# Patient Record
Sex: MALE | Race: WHITE | NOT HISPANIC OR LATINO | Employment: OTHER | URBAN - METROPOLITAN AREA
[De-identification: names, ages, dates, MRNs, and addresses within clinical notes are randomized per-mention and may not be internally consistent; named-entity substitution may affect disease eponyms.]

---

## 2019-04-18 ENCOUNTER — APPOINTMENT (OUTPATIENT)
Dept: RADIOLOGY | Facility: MEDICAL CENTER | Age: 84
DRG: 061 | End: 2019-04-18
Attending: EMERGENCY MEDICINE
Payer: MEDICARE

## 2019-04-18 ENCOUNTER — APPOINTMENT (OUTPATIENT)
Dept: RADIOLOGY | Facility: MEDICAL CENTER | Age: 84
DRG: 061 | End: 2019-04-18
Attending: INTERNAL MEDICINE
Payer: MEDICARE

## 2019-04-18 ENCOUNTER — HOSPITAL ENCOUNTER (INPATIENT)
Facility: MEDICAL CENTER | Age: 84
LOS: 3 days | DRG: 061 | End: 2019-04-21
Attending: EMERGENCY MEDICINE | Admitting: INTERNAL MEDICINE
Payer: MEDICARE

## 2019-04-18 DIAGNOSIS — I63.9 ACUTE CVA (CEREBROVASCULAR ACCIDENT) (HCC): ICD-10-CM

## 2019-04-18 LAB
ABO GROUP BLD: NORMAL
ABO GROUP BLD: NORMAL
ALBUMIN SERPL BCP-MCNC: 3.8 G/DL (ref 3.2–4.9)
ALBUMIN/GLOB SERPL: 1.2 G/DL
ALP SERPL-CCNC: 130 U/L (ref 30–99)
ALT SERPL-CCNC: 8 U/L (ref 2–50)
ANION GAP SERPL CALC-SCNC: 10 MMOL/L (ref 0–11.9)
APTT PPP: 35.2 SEC (ref 24.7–36)
AST SERPL-CCNC: 23 U/L (ref 12–45)
BASOPHILS # BLD AUTO: 0.5 % (ref 0–1.8)
BASOPHILS # BLD: 0.04 K/UL (ref 0–0.12)
BILIRUB SERPL-MCNC: 0.6 MG/DL (ref 0.1–1.5)
BLD GP AB SCN SERPL QL: NORMAL
BUN SERPL-MCNC: 9 MG/DL (ref 8–22)
CALCIUM SERPL-MCNC: 8.9 MG/DL (ref 8.5–10.5)
CHLORIDE SERPL-SCNC: 96 MMOL/L (ref 96–112)
CO2 SERPL-SCNC: 20 MMOL/L (ref 20–33)
CREAT SERPL-MCNC: 0.64 MG/DL (ref 0.5–1.4)
EKG IMPRESSION: NORMAL
EOSINOPHIL # BLD AUTO: 0.18 K/UL (ref 0–0.51)
EOSINOPHIL NFR BLD: 2.2 % (ref 0–6.9)
ERYTHROCYTE [DISTWIDTH] IN BLOOD BY AUTOMATED COUNT: 41.4 FL (ref 35.9–50)
EST. AVERAGE GLUCOSE BLD GHB EST-MCNC: 100 MG/DL
GLOBULIN SER CALC-MCNC: 3.3 G/DL (ref 1.9–3.5)
GLUCOSE SERPL-MCNC: 106 MG/DL (ref 65–99)
HBA1C MFR BLD: 5.1 % (ref 0–5.6)
HCT VFR BLD AUTO: 37.2 % (ref 42–52)
HGB BLD-MCNC: 13.5 G/DL (ref 14–18)
IMM GRANULOCYTES # BLD AUTO: 0.14 K/UL (ref 0–0.11)
IMM GRANULOCYTES NFR BLD AUTO: 1.7 % (ref 0–0.9)
INR PPP: 1.05 (ref 0.87–1.13)
LYMPHOCYTES # BLD AUTO: 1.49 K/UL (ref 1–4.8)
LYMPHOCYTES NFR BLD: 17.9 % (ref 22–41)
MCH RBC QN AUTO: 34.7 PG (ref 27–33)
MCHC RBC AUTO-ENTMCNC: 36.3 G/DL (ref 33.7–35.3)
MCV RBC AUTO: 95.6 FL (ref 81.4–97.8)
MONOCYTES # BLD AUTO: 1.36 K/UL (ref 0–0.85)
MONOCYTES NFR BLD AUTO: 16.4 % (ref 0–13.4)
NEUTROPHILS # BLD AUTO: 5.1 K/UL (ref 1.82–7.42)
NEUTROPHILS NFR BLD: 61.3 % (ref 44–72)
NRBC # BLD AUTO: 0 K/UL
NRBC BLD-RTO: 0 /100 WBC
PLATELET # BLD AUTO: 260 K/UL (ref 164–446)
PMV BLD AUTO: 10.2 FL (ref 9–12.9)
POTASSIUM SERPL-SCNC: 3.9 MMOL/L (ref 3.6–5.5)
PROT SERPL-MCNC: 7.1 G/DL (ref 6–8.2)
PROTHROMBIN TIME: 13.8 SEC (ref 12–14.6)
RBC # BLD AUTO: 3.89 M/UL (ref 4.7–6.1)
RH BLD: NORMAL
RH BLD: NORMAL
SODIUM SERPL-SCNC: 126 MMOL/L (ref 135–145)
TROPONIN I SERPL-MCNC: <0.01 NG/ML (ref 0–0.04)
WBC # BLD AUTO: 8.3 K/UL (ref 4.8–10.8)

## 2019-04-18 PROCEDURE — 85730 THROMBOPLASTIN TIME PARTIAL: CPT

## 2019-04-18 PROCEDURE — 36415 COLL VENOUS BLD VENIPUNCTURE: CPT

## 2019-04-18 PROCEDURE — 37212 THROMBOLYTIC VENOUS THERAPY: CPT

## 2019-04-18 PROCEDURE — 770022 HCHG ROOM/CARE - ICU (200)

## 2019-04-18 PROCEDURE — 96375 TX/PRO/DX INJ NEW DRUG ADDON: CPT

## 2019-04-18 PROCEDURE — 700105 HCHG RX REV CODE 258: Performed by: EMERGENCY MEDICINE

## 2019-04-18 PROCEDURE — 700117 HCHG RX CONTRAST REV CODE 255: Performed by: EMERGENCY MEDICINE

## 2019-04-18 PROCEDURE — 86850 RBC ANTIBODY SCREEN: CPT

## 2019-04-18 PROCEDURE — G0509 CRIT CARE TELEHEA CONSULT 50: HCPCS | Mod: G0 | Performed by: PSYCHIATRY & NEUROLOGY

## 2019-04-18 PROCEDURE — 700111 HCHG RX REV CODE 636 W/ 250 OVERRIDE (IP): Performed by: PSYCHIATRY & NEUROLOGY

## 2019-04-18 PROCEDURE — 3E03317 INTRODUCTION OF OTHER THROMBOLYTIC INTO PERIPHERAL VEIN, PERCUTANEOUS APPROACH: ICD-10-PCS | Performed by: PSYCHIATRY & NEUROLOGY

## 2019-04-18 PROCEDURE — 99223 1ST HOSP IP/OBS HIGH 75: CPT | Mod: AI | Performed by: INTERNAL MEDICINE

## 2019-04-18 PROCEDURE — 70450 CT HEAD/BRAIN W/O DYE: CPT

## 2019-04-18 PROCEDURE — 93005 ELECTROCARDIOGRAM TRACING: CPT | Performed by: EMERGENCY MEDICINE

## 2019-04-18 PROCEDURE — 5A1945Z RESPIRATORY VENTILATION, 24-96 CONSECUTIVE HOURS: ICD-10-PCS | Performed by: INTERNAL MEDICINE

## 2019-04-18 PROCEDURE — 304561 HCHG STAT O2

## 2019-04-18 PROCEDURE — 70498 CT ANGIOGRAPHY NECK: CPT

## 2019-04-18 PROCEDURE — 0BH17EZ INSERTION OF ENDOTRACHEAL AIRWAY INTO TRACHEA, VIA NATURAL OR ARTIFICIAL OPENING: ICD-10-PCS | Performed by: EMERGENCY MEDICINE

## 2019-04-18 PROCEDURE — 700111 HCHG RX REV CODE 636 W/ 250 OVERRIDE (IP)

## 2019-04-18 PROCEDURE — 700105 HCHG RX REV CODE 258: Performed by: INTERNAL MEDICINE

## 2019-04-18 PROCEDURE — 85025 COMPLETE CBC W/AUTO DIFF WBC: CPT

## 2019-04-18 PROCEDURE — 31500 INSERT EMERGENCY AIRWAY: CPT

## 2019-04-18 PROCEDURE — 99291 CRITICAL CARE FIRST HOUR: CPT

## 2019-04-18 PROCEDURE — 700105 HCHG RX REV CODE 258: Performed by: PSYCHIATRY & NEUROLOGY

## 2019-04-18 PROCEDURE — 83036 HEMOGLOBIN GLYCOSYLATED A1C: CPT

## 2019-04-18 PROCEDURE — 86901 BLOOD TYPING SEROLOGIC RH(D): CPT

## 2019-04-18 PROCEDURE — 70496 CT ANGIOGRAPHY HEAD: CPT

## 2019-04-18 PROCEDURE — 71045 X-RAY EXAM CHEST 1 VIEW: CPT

## 2019-04-18 PROCEDURE — 84484 ASSAY OF TROPONIN QUANT: CPT

## 2019-04-18 PROCEDURE — 85610 PROTHROMBIN TIME: CPT

## 2019-04-18 PROCEDURE — 80053 COMPREHEN METABOLIC PANEL: CPT

## 2019-04-18 PROCEDURE — 86900 BLOOD TYPING SEROLOGIC ABO: CPT

## 2019-04-18 PROCEDURE — 0042T CT-CEREBRAL PERFUSION ANALYSIS: CPT

## 2019-04-18 RX ORDER — MIDAZOLAM HYDROCHLORIDE 1 MG/ML
INJECTION INTRAMUSCULAR; INTRAVENOUS
Status: COMPLETED
Start: 2019-04-18 | End: 2019-04-18

## 2019-04-18 RX ORDER — ATORVASTATIN CALCIUM 40 MG/1
40 TABLET, FILM COATED ORAL EVERY EVENING
Status: DISCONTINUED | OUTPATIENT
Start: 2019-04-19 | End: 2019-04-19

## 2019-04-18 RX ORDER — ONDANSETRON 2 MG/ML
INJECTION INTRAMUSCULAR; INTRAVENOUS
Status: COMPLETED
Start: 2019-04-18 | End: 2019-04-18

## 2019-04-18 RX ORDER — ASPIRIN 81 MG/1
81 TABLET, CHEWABLE ORAL DAILY
COMMUNITY

## 2019-04-18 RX ORDER — SODIUM CHLORIDE 9 MG/ML
500 INJECTION, SOLUTION INTRAVENOUS ONCE
Status: COMPLETED | OUTPATIENT
Start: 2019-04-18 | End: 2019-04-19

## 2019-04-18 RX ORDER — POLYETHYLENE GLYCOL 3350 17 G/17G
1 POWDER, FOR SOLUTION ORAL
Status: DISCONTINUED | OUTPATIENT
Start: 2019-04-18 | End: 2019-04-19

## 2019-04-18 RX ORDER — ACETAMINOPHEN 325 MG/1
650 TABLET ORAL EVERY 6 HOURS PRN
Status: DISCONTINUED | OUTPATIENT
Start: 2019-04-18 | End: 2019-04-19

## 2019-04-18 RX ORDER — ASPIRIN 300 MG/1
300 SUPPOSITORY RECTAL DAILY
Status: DISCONTINUED | OUTPATIENT
Start: 2019-04-19 | End: 2019-04-19

## 2019-04-18 RX ORDER — MULTIVIT WITH MINERALS/LUTEIN
TABLET ORAL
Status: ON HOLD | COMMUNITY
End: 2019-04-19 | Stop reason: CLARIF

## 2019-04-18 RX ORDER — SODIUM PHOSPHATE,MONO-DIBASIC 19G-7G/118
1500 ENEMA (ML) RECTAL
Status: ON HOLD | COMMUNITY
End: 2019-04-19 | Stop reason: CLARIF

## 2019-04-18 RX ORDER — BISACODYL 10 MG
10 SUPPOSITORY, RECTAL RECTAL
Status: DISCONTINUED | OUTPATIENT
Start: 2019-04-18 | End: 2019-04-19

## 2019-04-18 RX ORDER — CYCLOBENZAPRINE HCL 10 MG
10 TABLET ORAL 3 TIMES DAILY PRN
Status: ON HOLD | COMMUNITY
End: 2019-04-19 | Stop reason: CLARIF

## 2019-04-18 RX ORDER — ONDANSETRON 2 MG/ML
4 INJECTION INTRAMUSCULAR; INTRAVENOUS ONCE
Status: COMPLETED | OUTPATIENT
Start: 2019-04-18 | End: 2019-04-18

## 2019-04-18 RX ORDER — AMOXICILLIN 250 MG
2 CAPSULE ORAL 2 TIMES DAILY
Status: DISCONTINUED | OUTPATIENT
Start: 2019-04-18 | End: 2019-04-19

## 2019-04-18 RX ORDER — LORAZEPAM 2 MG/ML
INJECTION INTRAMUSCULAR
Status: COMPLETED
Start: 2019-04-18 | End: 2019-04-18

## 2019-04-18 RX ORDER — METOPROLOL SUCCINATE 25 MG/1
25 TABLET, EXTENDED RELEASE ORAL 2 TIMES DAILY
Status: ON HOLD | COMMUNITY
End: 2019-04-21

## 2019-04-18 RX ORDER — TRAMADOL HYDROCHLORIDE 50 MG/1
50 TABLET ORAL EVERY 6 HOURS PRN
COMMUNITY

## 2019-04-18 RX ORDER — SIMVASTATIN 20 MG
20 TABLET ORAL NIGHTLY
Status: ON HOLD | COMMUNITY
End: 2019-04-21

## 2019-04-18 RX ORDER — ASPIRIN 81 MG/1
324 TABLET, CHEWABLE ORAL DAILY
Status: DISCONTINUED | OUTPATIENT
Start: 2019-04-19 | End: 2019-04-19

## 2019-04-18 RX ORDER — SODIUM CHLORIDE 9 MG/ML
50 INJECTION, SOLUTION INTRAVENOUS ONCE
Status: COMPLETED | OUTPATIENT
Start: 2019-04-18 | End: 2019-04-18

## 2019-04-18 RX ORDER — ONDANSETRON 2 MG/ML
4 INJECTION INTRAMUSCULAR; INTRAVENOUS EVERY 4 HOURS PRN
Status: DISCONTINUED | OUTPATIENT
Start: 2019-04-18 | End: 2019-04-21 | Stop reason: HOSPADM

## 2019-04-18 RX ORDER — LISINOPRIL 20 MG/1
30 TABLET ORAL DAILY
Status: ON HOLD | COMMUNITY
End: 2019-04-19 | Stop reason: CLARIF

## 2019-04-18 RX ORDER — SODIUM CHLORIDE 9 MG/ML
500 INJECTION, SOLUTION INTRAVENOUS ONCE
Status: COMPLETED | OUTPATIENT
Start: 2019-04-18 | End: 2019-04-18

## 2019-04-18 RX ORDER — ASPIRIN 325 MG
325 TABLET ORAL DAILY
Status: DISCONTINUED | OUTPATIENT
Start: 2019-04-19 | End: 2019-04-19

## 2019-04-18 RX ORDER — FINASTERIDE 5 MG/1
5 TABLET, FILM COATED ORAL DAILY
COMMUNITY

## 2019-04-18 RX ORDER — SODIUM CHLORIDE 9 MG/ML
50 INJECTION, SOLUTION INTRAVENOUS ONCE
Status: DISCONTINUED | OUTPATIENT
Start: 2019-04-18 | End: 2019-04-18

## 2019-04-18 RX ORDER — ONDANSETRON 4 MG/1
4 TABLET, ORALLY DISINTEGRATING ORAL EVERY 4 HOURS PRN
Status: DISCONTINUED | OUTPATIENT
Start: 2019-04-18 | End: 2019-04-21

## 2019-04-18 RX ORDER — SODIUM CHLORIDE 9 MG/ML
INJECTION, SOLUTION INTRAVENOUS CONTINUOUS
Status: DISCONTINUED | OUTPATIENT
Start: 2019-04-18 | End: 2019-04-19

## 2019-04-18 RX ADMIN — SODIUM CHLORIDE 500 ML: 9 INJECTION, SOLUTION INTRAVENOUS at 23:15

## 2019-04-18 RX ADMIN — ONDANSETRON 4 MG: 2 INJECTION INTRAMUSCULAR; INTRAVENOUS at 21:30

## 2019-04-18 RX ADMIN — MIDAZOLAM 2 MG: 1 INJECTION INTRAMUSCULAR; INTRAVENOUS at 23:45

## 2019-04-18 RX ADMIN — MIDAZOLAM HYDROCHLORIDE 2 MG: 1 INJECTION INTRAMUSCULAR; INTRAVENOUS at 23:45

## 2019-04-18 RX ADMIN — LORAZEPAM 2 MG: 2 INJECTION INTRAMUSCULAR; INTRAVENOUS at 23:30

## 2019-04-18 RX ADMIN — ALTEPLASE 70.7 MG: KIT at 20:22

## 2019-04-18 RX ADMIN — SODIUM CHLORIDE 500 ML: 9 INJECTION, SOLUTION INTRAVENOUS at 21:32

## 2019-04-18 RX ADMIN — IOHEXOL 140 ML: 350 INJECTION, SOLUTION INTRAVENOUS at 20:00

## 2019-04-18 RX ADMIN — SODIUM CHLORIDE 50 ML: 9 INJECTION, SOLUTION INTRAVENOUS at 21:22

## 2019-04-18 RX ADMIN — LORAZEPAM 2 MG: 2 INJECTION INTRAMUSCULAR at 23:30

## 2019-04-18 ASSESSMENT — ENCOUNTER SYMPTOMS: FOCAL WEAKNESS: 1

## 2019-04-19 ENCOUNTER — APPOINTMENT (OUTPATIENT)
Dept: RADIOLOGY | Facility: MEDICAL CENTER | Age: 84
DRG: 061 | End: 2019-04-19
Attending: EMERGENCY MEDICINE
Payer: MEDICARE

## 2019-04-19 ENCOUNTER — APPOINTMENT (OUTPATIENT)
Dept: RADIOLOGY | Facility: MEDICAL CENTER | Age: 84
DRG: 061 | End: 2019-04-19
Attending: INTERNAL MEDICINE
Payer: MEDICARE

## 2019-04-19 PROBLEM — E87.1 HYPONATREMIA: Status: ACTIVE | Noted: 2019-04-19

## 2019-04-19 PROBLEM — S32.9XXA PELVIC FRACTURE (HCC): Status: ACTIVE | Noted: 2019-04-19

## 2019-04-19 PROBLEM — I95.9 HYPOTENSION: Status: ACTIVE | Noted: 2019-04-19

## 2019-04-19 PROBLEM — I63.9 STROKE (HCC): Status: ACTIVE | Noted: 2019-04-19

## 2019-04-19 PROBLEM — I25.10 CAD (CORONARY ARTERY DISEASE): Status: ACTIVE | Noted: 2019-04-19

## 2019-04-19 PROBLEM — J96.01 ACUTE RESPIRATORY FAILURE WITH HYPOXIA (HCC): Status: ACTIVE | Noted: 2019-04-19

## 2019-04-19 PROBLEM — I10 ESSENTIAL HYPERTENSION: Status: ACTIVE | Noted: 2019-04-19

## 2019-04-19 LAB
ACTION RANGE TRIGGERED IACRT: NO
ACTION RANGE TRIGGERED IACRT: YES
ANION GAP SERPL CALC-SCNC: 11 MMOL/L (ref 0–11.9)
ANISOCYTOSIS BLD QL SMEAR: ABNORMAL
APPEARANCE UR: CLEAR
BACTERIA #/AREA URNS HPF: NEGATIVE /HPF
BASE EXCESS BLDA CALC-SCNC: -7 MMOL/L (ref -4–3)
BASE EXCESS BLDA CALC-SCNC: -9 MMOL/L (ref -4–3)
BASOPHILS # BLD AUTO: 0 % (ref 0–1.8)
BASOPHILS # BLD AUTO: 0.2 % (ref 0–1.8)
BASOPHILS # BLD: 0 K/UL (ref 0–0.12)
BASOPHILS # BLD: 0.03 K/UL (ref 0–0.12)
BILIRUB UR QL STRIP.AUTO: NEGATIVE
BODY TEMPERATURE: ABNORMAL DEGREES
BODY TEMPERATURE: ABNORMAL DEGREES
BUN SERPL-MCNC: 11 MG/DL (ref 8–22)
BURR CELLS BLD QL SMEAR: NORMAL
CALCIUM SERPL-MCNC: 8.3 MG/DL (ref 8.5–10.5)
CHLORIDE SERPL-SCNC: 104 MMOL/L (ref 96–112)
CHOLEST SERPL-MCNC: 98 MG/DL (ref 100–199)
CO2 BLDA-SCNC: 17 MMOL/L (ref 20–33)
CO2 BLDA-SCNC: 18 MMOL/L (ref 20–33)
CO2 SERPL-SCNC: 20 MMOL/L (ref 20–33)
COLOR UR: YELLOW
CREAT SERPL-MCNC: 0.75 MG/DL (ref 0.5–1.4)
EKG IMPRESSION: NORMAL
EOSINOPHIL # BLD AUTO: 0.01 K/UL (ref 0–0.51)
EOSINOPHIL # BLD AUTO: 0.22 K/UL (ref 0–0.51)
EOSINOPHIL NFR BLD: 0.1 % (ref 0–6.9)
EOSINOPHIL NFR BLD: 1.7 % (ref 0–6.9)
EPI CELLS #/AREA URNS HPF: NEGATIVE /HPF
ERYTHROCYTE [DISTWIDTH] IN BLOOD BY AUTOMATED COUNT: 43 FL (ref 35.9–50)
ERYTHROCYTE [DISTWIDTH] IN BLOOD BY AUTOMATED COUNT: 44 FL (ref 35.9–50)
GLUCOSE SERPL-MCNC: 97 MG/DL (ref 65–99)
GLUCOSE UR STRIP.AUTO-MCNC: NEGATIVE MG/DL
HCO3 BLDA-SCNC: 16.6 MMOL/L (ref 17–25)
HCO3 BLDA-SCNC: 16.7 MMOL/L (ref 17–25)
HCT VFR BLD AUTO: 35.6 % (ref 42–52)
HCT VFR BLD AUTO: 37.1 % (ref 42–52)
HDLC SERPL-MCNC: 35 MG/DL
HGB BLD-MCNC: 12.5 G/DL (ref 14–18)
HGB BLD-MCNC: 12.7 G/DL (ref 14–18)
HOROWITZ INDEX BLDA+IHG-RTO: 263 MM[HG]
HOROWITZ INDEX BLDA+IHG-RTO: 366 MM[HG]
HYALINE CASTS #/AREA URNS LPF: ABNORMAL /LPF
IMM GRANULOCYTES # BLD AUTO: 0.14 K/UL (ref 0–0.11)
IMM GRANULOCYTES NFR BLD AUTO: 1 % (ref 0–0.9)
INST. QUALIFIED PATIENT IIQPT: YES
INST. QUALIFIED PATIENT IIQPT: YES
KETONES UR STRIP.AUTO-MCNC: 15 MG/DL
LACTATE BLD-SCNC: 0.7 MMOL/L (ref 0.5–2)
LDLC SERPL CALC-MCNC: 48 MG/DL
LEUKOCYTE ESTERASE UR QL STRIP.AUTO: NEGATIVE
LYMPHOCYTES # BLD AUTO: 1.06 K/UL (ref 1–4.8)
LYMPHOCYTES # BLD AUTO: 1.72 K/UL (ref 1–4.8)
LYMPHOCYTES NFR BLD: 13.3 % (ref 22–41)
LYMPHOCYTES NFR BLD: 7.4 % (ref 22–41)
MACROCYTES BLD QL SMEAR: ABNORMAL
MAGNESIUM SERPL-MCNC: 1.9 MG/DL (ref 1.5–2.5)
MANUAL DIFF BLD: NORMAL
MCH RBC QN AUTO: 34.2 PG (ref 27–33)
MCH RBC QN AUTO: 34.2 PG (ref 27–33)
MCHC RBC AUTO-ENTMCNC: 34.2 G/DL (ref 33.7–35.3)
MCHC RBC AUTO-ENTMCNC: 35.1 G/DL (ref 33.7–35.3)
MCV RBC AUTO: 100 FL (ref 81.4–97.8)
MCV RBC AUTO: 97.3 FL (ref 81.4–97.8)
METAMYELOCYTES NFR BLD MANUAL: 0.8 %
MICRO URNS: ABNORMAL
MONOCYTES # BLD AUTO: 1.42 K/UL (ref 0–0.85)
MONOCYTES # BLD AUTO: 1.83 K/UL (ref 0–0.85)
MONOCYTES NFR BLD AUTO: 14.2 % (ref 0–13.4)
MONOCYTES NFR BLD AUTO: 9.9 % (ref 0–13.4)
MORPHOLOGY BLD-IMP: NORMAL
NEUTROPHILS # BLD AUTO: 11.74 K/UL (ref 1.82–7.42)
NEUTROPHILS # BLD AUTO: 9.03 K/UL (ref 1.82–7.42)
NEUTROPHILS NFR BLD: 69.2 % (ref 44–72)
NEUTROPHILS NFR BLD: 81.4 % (ref 44–72)
NEUTS BAND NFR BLD MANUAL: 0.8 % (ref 0–10)
NITRITE UR QL STRIP.AUTO: NEGATIVE
NRBC # BLD AUTO: 0 K/UL
NRBC # BLD AUTO: 0 K/UL
NRBC BLD-RTO: 0 /100 WBC
NRBC BLD-RTO: 0 /100 WBC
O2/TOTAL GAS SETTING VFR VENT: 40 %
O2/TOTAL GAS SETTING VFR VENT: 50 %
PCO2 BLDA: 27.9 MMHG (ref 26–37)
PCO2 BLDA: 36.3 MMHG (ref 26–37)
PCO2 TEMP ADJ BLDA: 27 MMHG (ref 26–37)
PCO2 TEMP ADJ BLDA: 36.2 MMHG (ref 26–37)
PH BLDA: 7.27 [PH] (ref 7.4–7.5)
PH BLDA: 7.38 [PH] (ref 7.4–7.5)
PH TEMP ADJ BLDA: 7.27 [PH] (ref 7.4–7.5)
PH TEMP ADJ BLDA: 7.39 [PH] (ref 7.4–7.5)
PH UR STRIP.AUTO: 5.5 [PH]
PLATELET # BLD AUTO: 302 K/UL (ref 164–446)
PLATELET # BLD AUTO: 319 K/UL (ref 164–446)
PLATELET BLD QL SMEAR: NORMAL
PMV BLD AUTO: 10.6 FL (ref 9–12.9)
PMV BLD AUTO: 9.6 FL (ref 9–12.9)
PO2 BLDA: 105 MMHG (ref 64–87)
PO2 BLDA: 183 MMHG (ref 64–87)
PO2 TEMP ADJ BLDA: 101 MMHG (ref 64–87)
PO2 TEMP ADJ BLDA: 183 MMHG (ref 64–87)
POIKILOCYTOSIS BLD QL SMEAR: NORMAL
POTASSIUM SERPL-SCNC: 4.1 MMOL/L (ref 3.6–5.5)
PROT UR QL STRIP: 30 MG/DL
RBC # BLD AUTO: 3.66 M/UL (ref 4.7–6.1)
RBC # BLD AUTO: 3.71 M/UL (ref 4.7–6.1)
RBC # URNS HPF: >150 /HPF
RBC BLD AUTO: PRESENT
RBC UR QL AUTO: ABNORMAL
SAO2 % BLDA: 98 % (ref 93–99)
SAO2 % BLDA: 99 % (ref 93–99)
SMUDGE CELLS BLD QL SMEAR: NORMAL
SODIUM SERPL-SCNC: 135 MMOL/L (ref 135–145)
SP GR UR STRIP.AUTO: 1.04
SPECIMEN DRAWN FROM PATIENT: ABNORMAL
SPECIMEN DRAWN FROM PATIENT: ABNORMAL
TRIGL SERPL-MCNC: 75 MG/DL (ref 0–149)
UROBILINOGEN UR STRIP.AUTO-MCNC: 0.2 MG/DL
WBC # BLD AUTO: 12.9 K/UL (ref 4.8–10.8)
WBC # BLD AUTO: 14.4 K/UL (ref 4.8–10.8)
WBC #/AREA URNS HPF: ABNORMAL /HPF

## 2019-04-19 PROCEDURE — 700105 HCHG RX REV CODE 258: Performed by: EMERGENCY MEDICINE

## 2019-04-19 PROCEDURE — 700101 HCHG RX REV CODE 250: Performed by: INTERNAL MEDICINE

## 2019-04-19 PROCEDURE — 81001 URINALYSIS AUTO W/SCOPE: CPT

## 2019-04-19 PROCEDURE — 80061 LIPID PANEL: CPT

## 2019-04-19 PROCEDURE — 96376 TX/PRO/DX INJ SAME DRUG ADON: CPT

## 2019-04-19 PROCEDURE — 700111 HCHG RX REV CODE 636 W/ 250 OVERRIDE (IP): Performed by: INTERNAL MEDICINE

## 2019-04-19 PROCEDURE — 700105 HCHG RX REV CODE 258: Performed by: INTERNAL MEDICINE

## 2019-04-19 PROCEDURE — 700111 HCHG RX REV CODE 636 W/ 250 OVERRIDE (IP): Performed by: EMERGENCY MEDICINE

## 2019-04-19 PROCEDURE — 303105 HCHG CATHETER EXTRA

## 2019-04-19 PROCEDURE — 71045 X-RAY EXAM CHEST 1 VIEW: CPT

## 2019-04-19 PROCEDURE — 70450 CT HEAD/BRAIN W/O DYE: CPT

## 2019-04-19 PROCEDURE — 92585 HCHG BER: CPT

## 2019-04-19 PROCEDURE — 85025 COMPLETE CBC W/AUTO DIFF WBC: CPT

## 2019-04-19 PROCEDURE — 83735 ASSAY OF MAGNESIUM: CPT

## 2019-04-19 PROCEDURE — 74177 CT ABD & PELVIS W/CONTRAST: CPT

## 2019-04-19 PROCEDURE — 94002 VENT MGMT INPAT INIT DAY: CPT

## 2019-04-19 PROCEDURE — 99291 CRITICAL CARE FIRST HOUR: CPT | Performed by: INTERNAL MEDICINE

## 2019-04-19 PROCEDURE — 96375 TX/PRO/DX INJ NEW DRUG ADDON: CPT

## 2019-04-19 PROCEDURE — 93005 ELECTROCARDIOGRAM TRACING: CPT | Performed by: INTERNAL MEDICINE

## 2019-04-19 PROCEDURE — 83605 ASSAY OF LACTIC ACID: CPT

## 2019-04-19 PROCEDURE — 70551 MRI BRAIN STEM W/O DYE: CPT

## 2019-04-19 PROCEDURE — 51702 INSERT TEMP BLADDER CATH: CPT

## 2019-04-19 PROCEDURE — 302214 INTUBATION BOX: Performed by: EMERGENCY MEDICINE

## 2019-04-19 PROCEDURE — 96365 THER/PROPH/DIAG IV INF INIT: CPT

## 2019-04-19 PROCEDURE — 4A00X4Z MEASUREMENT OF CENTRAL NERVOUS ELECTRICAL ACTIVITY, EXTERNAL APPROACH: ICD-10-PCS | Performed by: PSYCHIATRY & NEUROLOGY

## 2019-04-19 PROCEDURE — 770022 HCHG ROOM/CARE - ICU (200)

## 2019-04-19 PROCEDURE — 96368 THER/DIAG CONCURRENT INF: CPT

## 2019-04-19 PROCEDURE — 700105 HCHG RX REV CODE 258

## 2019-04-19 PROCEDURE — 85007 BL SMEAR W/DIFF WBC COUNT: CPT

## 2019-04-19 PROCEDURE — 85027 COMPLETE CBC AUTOMATED: CPT

## 2019-04-19 PROCEDURE — 99292 CRITICAL CARE ADDL 30 MIN: CPT | Performed by: INTERNAL MEDICINE

## 2019-04-19 PROCEDURE — 96366 THER/PROPH/DIAG IV INF ADDON: CPT

## 2019-04-19 PROCEDURE — 95819 EEG AWAKE AND ASLEEP: CPT | Mod: 26 | Performed by: PSYCHIATRY & NEUROLOGY

## 2019-04-19 PROCEDURE — 95819 EEG AWAKE AND ASLEEP: CPT | Performed by: PSYCHIATRY & NEUROLOGY

## 2019-04-19 PROCEDURE — 36600 WITHDRAWAL OF ARTERIAL BLOOD: CPT

## 2019-04-19 PROCEDURE — 700117 HCHG RX CONTRAST REV CODE 255: Performed by: INTERNAL MEDICINE

## 2019-04-19 PROCEDURE — 82803 BLOOD GASES ANY COMBINATION: CPT

## 2019-04-19 PROCEDURE — 700111 HCHG RX REV CODE 636 W/ 250 OVERRIDE (IP)

## 2019-04-19 PROCEDURE — 80048 BASIC METABOLIC PNL TOTAL CA: CPT

## 2019-04-19 PROCEDURE — 99233 SBSQ HOSP IP/OBS HIGH 50: CPT | Performed by: PSYCHIATRY & NEUROLOGY

## 2019-04-19 PROCEDURE — 94003 VENT MGMT INPAT SUBQ DAY: CPT

## 2019-04-19 PROCEDURE — 96367 TX/PROPH/DG ADDL SEQ IV INF: CPT

## 2019-04-19 RX ORDER — ACETAMINOPHEN 325 MG/1
650 TABLET ORAL EVERY 6 HOURS PRN
Status: DISCONTINUED | OUTPATIENT
Start: 2019-04-19 | End: 2019-04-21 | Stop reason: HOSPADM

## 2019-04-19 RX ORDER — LORAZEPAM 2 MG/ML
2 INJECTION INTRAMUSCULAR ONCE
Status: COMPLETED | OUTPATIENT
Start: 2019-04-19 | End: 2019-04-18

## 2019-04-19 RX ORDER — ASPIRIN 325 MG
325 TABLET ORAL DAILY
Status: DISCONTINUED | OUTPATIENT
Start: 2019-04-19 | End: 2019-04-21 | Stop reason: HOSPADM

## 2019-04-19 RX ORDER — LISINOPRIL 30 MG/1
30 TABLET ORAL DAILY
COMMUNITY

## 2019-04-19 RX ORDER — FUROSEMIDE 20 MG/1
20 TABLET ORAL DAILY
COMMUNITY

## 2019-04-19 RX ORDER — MIDAZOLAM HYDROCHLORIDE 1 MG/ML
2 INJECTION INTRAMUSCULAR; INTRAVENOUS ONCE
Status: COMPLETED | OUTPATIENT
Start: 2019-04-19 | End: 2019-04-18

## 2019-04-19 RX ORDER — LORAZEPAM 2 MG/ML
INJECTION INTRAMUSCULAR
Status: COMPLETED
Start: 2019-04-19 | End: 2019-04-19

## 2019-04-19 RX ORDER — LORAZEPAM 2 MG/ML
2 INJECTION INTRAMUSCULAR ONCE
Status: COMPLETED | OUTPATIENT
Start: 2019-04-19 | End: 2019-04-19

## 2019-04-19 RX ORDER — BISACODYL 10 MG
10 SUPPOSITORY, RECTAL RECTAL
Status: DISCONTINUED | OUTPATIENT
Start: 2019-04-19 | End: 2019-04-21 | Stop reason: HOSPADM

## 2019-04-19 RX ORDER — ATORVASTATIN CALCIUM 40 MG/1
40 TABLET, FILM COATED ORAL EVERY EVENING
Status: DISCONTINUED | OUTPATIENT
Start: 2019-04-19 | End: 2019-04-21 | Stop reason: HOSPADM

## 2019-04-19 RX ORDER — AMOXICILLIN 250 MG
2 CAPSULE ORAL 2 TIMES DAILY
Status: DISCONTINUED | OUTPATIENT
Start: 2019-04-19 | End: 2019-04-21 | Stop reason: HOSPADM

## 2019-04-19 RX ORDER — ASPIRIN 300 MG/1
300 SUPPOSITORY RECTAL DAILY
Status: DISCONTINUED | OUTPATIENT
Start: 2019-04-19 | End: 2019-04-21 | Stop reason: HOSPADM

## 2019-04-19 RX ORDER — POTASSIUM CHLORIDE 750 MG/1
10 CAPSULE, EXTENDED RELEASE ORAL DAILY
COMMUNITY

## 2019-04-19 RX ORDER — FAMOTIDINE 20 MG/1
20 TABLET, FILM COATED ORAL EVERY 12 HOURS
Status: DISCONTINUED | OUTPATIENT
Start: 2019-04-19 | End: 2019-04-19

## 2019-04-19 RX ORDER — ASPIRIN 81 MG/1
324 TABLET, CHEWABLE ORAL DAILY
Status: DISCONTINUED | OUTPATIENT
Start: 2019-04-19 | End: 2019-04-21 | Stop reason: HOSPADM

## 2019-04-19 RX ORDER — FAMOTIDINE 20 MG/1
20 TABLET, FILM COATED ORAL EVERY 12 HOURS
Status: DISCONTINUED | OUTPATIENT
Start: 2019-04-19 | End: 2019-04-20

## 2019-04-19 RX ORDER — POLYETHYLENE GLYCOL 3350 17 G/17G
1 POWDER, FOR SOLUTION ORAL
Status: DISCONTINUED | OUTPATIENT
Start: 2019-04-19 | End: 2019-04-21 | Stop reason: HOSPADM

## 2019-04-19 RX ADMIN — FAMOTIDINE 20 MG: 10 INJECTION INTRAVENOUS at 17:01

## 2019-04-19 RX ADMIN — PROPOFOL 20 MCG/KG/MIN: 10 INJECTION, EMULSION INTRAVENOUS at 15:30

## 2019-04-19 RX ADMIN — LORAZEPAM 2 MG: 2 INJECTION INTRAMUSCULAR at 00:30

## 2019-04-19 RX ADMIN — ONDANSETRON 4 MG: 2 SOLUTION INTRAMUSCULAR; INTRAVENOUS at 16:55

## 2019-04-19 RX ADMIN — SODIUM CHLORIDE: 900 INJECTION INTRAVENOUS at 09:00

## 2019-04-19 RX ADMIN — FENTANYL CITRATE 100 MCG/HR: 50 INJECTION, SOLUTION INTRAMUSCULAR; INTRAVENOUS at 01:15

## 2019-04-19 RX ADMIN — LORAZEPAM 2 MG: 2 INJECTION INTRAMUSCULAR; INTRAVENOUS at 00:30

## 2019-04-19 RX ADMIN — FENTANYL CITRATE 100 MCG/HR: 50 INJECTION, SOLUTION INTRAMUSCULAR; INTRAVENOUS at 18:25

## 2019-04-19 RX ADMIN — NOREPINEPHRINE BITARTRATE 5 MCG/MIN: 1 INJECTION INTRAVENOUS at 03:03

## 2019-04-19 RX ADMIN — FENTANYL CITRATE 100 MCG: 50 INJECTION, SOLUTION INTRAMUSCULAR; INTRAVENOUS at 01:00

## 2019-04-19 RX ADMIN — PROPOFOL 40 MCG/KG/MIN: 10 INJECTION, EMULSION INTRAVENOUS at 00:15

## 2019-04-19 RX ADMIN — SODIUM CHLORIDE: 900 INJECTION INTRAVENOUS at 01:37

## 2019-04-19 RX ADMIN — SODIUM CHLORIDE 2000 MG: 9 INJECTION, SOLUTION INTRAVENOUS at 00:52

## 2019-04-19 RX ADMIN — IOHEXOL 100 ML: 350 INJECTION, SOLUTION INTRAVENOUS at 06:40

## 2019-04-19 RX ADMIN — FENTANYL CITRATE 100 MCG: 50 INJECTION, SOLUTION INTRAMUSCULAR; INTRAVENOUS at 00:30

## 2019-04-19 RX ADMIN — FENTANYL CITRATE 100 MCG: 50 INJECTION INTRAMUSCULAR; INTRAVENOUS at 21:00

## 2019-04-19 NOTE — ED PROVIDER NOTES
ED Provider Note    Scribed for Farooq Vargas M.D. by Ilir Negron. 4/18/2019, 7:29 PM.    Primary care provider: No primary care provider on file.  Means of arrival: Ambulance  History obtained from: EMS  History limited by: Critical condition    CHIEF COMPLAINT  Chief Complaint   Patient presents with   • Possible Stroke     Last known well at ~18:00. Left sided weakness and hemianopsia per EMS       HPI  Tavo Ramos is a 83 y.o. male who presents to the Emergency Department for evaluation of possible stroke onset prior to arrival. The patient was last seen at baseline 1.5 hours ago. EMS reports left sided facial droop, left sided weakness, slight drift, left ataxia and hemianopia, all of which has improved slightly. No exacerbating or alleviating factors. Patient denies any chest pain. Per EMS, the patient had an NIH of 9, but has been improving. He does not use any anticoagulants.    History limited secondary to critical condition.    REVIEW OF SYSTEMS  Review of Systems   Cardiovascular: Negative for chest pain.   Neurological: Positive for focal weakness.        Facial droop, drift, ataxia, hemianopia     Review of systems limited secondary to critical condition.    PAST MEDICAL HISTORY  Limited secondary to critical condition.    SURGICAL HISTORY  patient denies any surgical history    SOCIAL HISTORY  Limited secondary to critical condition.    FAMILY HISTORY  None pertinent    CURRENT MEDICATIONS  Home Medications     Reviewed by Alexy Napier R.N. (Registered Nurse) on 04/18/19 at 2009  Med List Status: Partial   Medication Last Dose Status   Ascorbic Acid (VITAMIN C) 1000 MG Tab  Active   aspirin (ASA) 81 MG Chew Tab chewable tablet  Active   cyclobenzaprine (FLEXERIL) 10 MG Tab  Active   finasteride (PROSCAR) 5 MG Tab  Active   glucosamine Sulfate 500 MG Cap  Active   lisinopril (PRINIVIL) 20 MG Tab  Active   metoprolol SR (TOPROL XL) 25 MG TABLET SR 24 HR  Active   simvastatin  (ZOCOR) 20 MG Tab  Active   tramadol (ULTRAM) 50 MG Tab  Active   vitamin D (CHOLECALCIFEROL) 1000 UNIT Tab  Active                ALLERGIES  None known.    PHYSICAL EXAM  VITAL SIGNS: /91   Pulse 97   Resp 16   Wt 87.1 kg (192 lb)   SpO2 99%     Constitutional:   acute distress  HENT:  Moist mucous membranes  Eyes:  No conjunctivitis or icterus  Neck:  trachea is midline, no palpable thyroid  Lymphatic:  No cervical lymphadenopathy  Cardiovascular:  Regular rate and rhythm, no murmurs  Thorax & Lungs:  Normal breath sounds, no rhonchi  Abdomen: Normal bowel sounds, Soft, Non-tender  Skin:.  no rash  Back:  Non-tender, no CVA tenderness  Extremities:   no edema  Vascular: symmetric radial pulse, good distal pusles  Neurologic: NIH score of 8, Alert, oriented to month, not year. Obeyed commands, but could not identify objects consistently, left sided facial paralysis and could not assess sensation. EOMI, unable to assess visual field, complete weakness of the left arm, Strong motor function with good sensation to right upper and bilateral lower extremities, normal fingerto nose testing on the right.    LABS  Labs Reviewed   CBC WITH DIFFERENTIAL - Abnormal; Notable for the following:        Result Value    RBC 3.89 (*)     Hemoglobin 13.5 (*)     Hematocrit 37.2 (*)     MCH 34.7 (*)     MCHC 36.3 (*)     Lymphocytes 17.90 (*)     Monocytes 16.40 (*)     Immature Granulocytes 1.70 (*)     Monos (Absolute) 1.36 (*)     Immature Granulocytes (abs) 0.14 (*)     All other components within normal limits    Narrative:     Indicate which anticoagulants the patient is on:->UNKNOWN   PROTHROMBIN TIME    Narrative:     Indicate which anticoagulants the patient is on:->UNKNOWN   APTT    Narrative:     Indicate which anticoagulants the patient is on:->UNKNOWN   COD (ADULT)   TROPONIN    Narrative:     Indicate which anticoagulants the patient is on:->UNKNOWN   URINALYSIS,CULTURE IF INDICATED   COMP METABOLIC PANEL    ABO AND RH CONFIRMATION     All labs reviewed by me.    EKG Interpretation  Interpreted by me  Normal sinus rhythm 104 with a normal corrected QT interval normal QRS with left axis inferior Q waves poor R wave progression no old EKG for comparison    RADIOLOGY  DX-CHEST-PORTABLE (1 VIEW)   Final Result         1.  No acute cardiopulmonary disease.      CT-CTA NECK WITH & W/O-POST PROCESSING   Final Result         1.  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   2.  Bilateral scattered carotid atherosclerosis, greatest at the right carotid bifurcation, resulting in less than 50% stenosis.   3.  Large cystic structure in the posterior neck near the midline., Could represent seroma, sebaceous cyst, abscess, or other cystic lesion. Further evaluation with sonography for further characterization.      CT-CTA HEAD WITH & W/O-POST PROCESS   Final Result         1.  Atherosclerosis of the bilateral intracranial internal carotid arteries with less than 50% stenosis   2.  Hypoplastic right A1 segment.   3.  Hypoplastic distal bilateral vertebral arteries and basilar artery.   4.  Persistent fetal morphology of the bilateral posterior cerebral arteries.   5.  No aneurysm or occlusion.      CT-CEREBRAL PERFUSION ANALYSIS   Final Result      1.  Cerebral blood flow less than 30% likely representing completed infarct = 0 mL.      2.  T Max more than 6 seconds likely representing combination of completed infarct and ischemia = 4 mL.      3.  Mismatched volume likely representing ischemic brain/penumbra = 4 mL      4.  Please note that the cerebral perfusion was performed on the limited brain tissue around the basal ganglia region. Infarct/ischemia outside the CT perfusion sections can be missed in this study.      CT-HEAD W/O   Final Result      1.  No acute intracranial findings.      2.  Diffuse atrophy and periventricular white matter change, consistent with chronic small vessel disease.           The radiologist's  interpretation of all radiological studies have been reviewed by me.    COURSE & MEDICAL DECISION MAKING  Pertinent Labs & Imaging studies reviewed. (See chart for details)    7:29 PM - Patient seen and examined at bedside. Ordered DX chest, CT head, CT Cerebral perfusion, CT CTA head, CT CTA neck, CBC, Prothrombin time, APTT, COD, Troponin, UA, CMP, ABO and Rh, and EKG to evaluate his symptoms. The differential diagnoses include but are not limited to: Rule out CVA, MI, electrolyte abnormality. I informed the patient that he will go to CT immediately and will need to undergo diagnostic testing. I spoke with Neurology and he is a good candidate for TPA.    8:29 PM I reevaluated the patient and he was resting in bed. I informed him we are still waiting on some results.    CRITICAL CARE  The very real possibilty of a deterioration of this patient's condition required the highest level of my preparedness for sudden, emergent intervention.  I provided critical care services, which included medication orders, frequent reevaluations of the patient's condition and response to treatment, ordering and reviewing test results, and discussing the case with various consultants.  The critical care time associated with the care of the patient was 30 minutes. Review chart for interventions. This time is exclusive of any other billable procedures.     Medical Decision Making:   Patient came in with a deep deficit in the left  as well as facial droop.  NIH was between 9 and 11.  He was taken to CAT scan and had no contraindications for TPA.  I did discuss the case with Dr. Herring of neurology been elected to give the patient TPA that has been unsuccessful he had significant improvement in his neurologic function.  930 he did complained of some slight nausea he was given some Zofran I rechecked him his blood pressure dropped to 77 we lowered his head gave him some fluid his blood pressures improved.    Patient is a full  code    FINAL IMPRESSION  1. Acute CVA (cerebrovascular accident) (Formerly [facility])         Critical care time of 30 minutes.     IIlir (Scribe), am scribing for, and in the presence of, Farooq Vargas M.D..    Electronically signed by: Ilir Negron (Scribe), 4/18/2019    IFarooq M.D. personally performed the services described in this documentation, as scribed by Ilir Negron in my presence, and it is both accurate and complete. C    The note accurately reflects work and decisions made by me.  Farooq Vargas  4/18/2019  10:21 PM

## 2019-04-19 NOTE — PROGRESS NOTES
AM Bedside report received from NOC RN. Plan of care discussed. Lines labs med's and orders reviewed. Pt restshawn, pradip, follows, see Neuro assessment. Pt's wife at bedside.

## 2019-04-19 NOTE — ED TRIAGE NOTES
Tavo Ramos  83 y.o. male  Chief Complaint   Patient presents with   • Possible Stroke     Last known well at ~18:00. Left sided weakness and hemianopsia per EMS       Pt BIB care flight for above CC.   Pt activated as a stroke. Pt to and from CT.   Pt to Red 10. Report to Dalton CONNOLLY.   Pharmacist, and tele stroke being utilized bedside.       Blood Pressure : 154/91, NIBP: 138/70, NIBP MAP (Calculated): 86, Heart Rate (Monitored): 96, Pulse: 97, Respiration: (!) 21, Weight: 87.1 kg (192 lb), Pulse Oximetry: 99 %, O2 (LPM): 2, O2 Delivery: Nasal Cannula

## 2019-04-19 NOTE — PROGRESS NOTES
Dr Johnson updated that Pt pulled out OG tube. OG not to be re inserted at this time r/t TPA. Plan to keep Pt intubated overnight and re eval need for cortrak or OG in am per Dr Johnson.

## 2019-04-19 NOTE — PROGRESS NOTES
Hospitalists signing off for now as patient is now on ventilator; will sign back on once weans from vent.  D/W Dr. Johnson.

## 2019-04-19 NOTE — CONSULTS
Critical Care Consultation    Date of consult: 4/19/2019    Referring Physician  Kehinde Whitaker M.D.    Reason for Consultation  Stroke s/p TPA    History of Presenting Illness  83 y.o. male who presented 4/18/2019 with right hip fracture 3/25, CABG x4, Stent x3, chol, cholecystectomy, htn, that about 4pm became real shaky the wife called EMS was found to have left sided weakness given TPA had a NIH 11 (left sided weakness and sensory loss, left visual field cut as well as facial droop) that improved to NIH 2 after TPA and then became acutely agitated and concerns for possible ICH post TPA so patient was intubated and good thing repeat CT Head was negative for acute pathology. Patient was still agitated on ventilator was started on propofol and fentanyl he became hypotensive even after stopping propofol and dropping fentanyl for 15 minutes levophed was started.     US bedside IVC 3.48cm with no respiratory change Good BiV function patient    Code Status  Full Code    Review of Systems  Review of Systems   Unable to perform ROS: Intubated       Past Medical History   has a past medical history of CAD (coronary artery disease); History of open heart surgery; Hypertension; and Pelvic fracture (HCC).    Surgical History   has no past surgical history on file.    Family History  family history is not on file.    Social History   reports that he has quit smoking. His smoking use included Cigars. He has never used smokeless tobacco. He reports that he drinks alcohol. He reports that he does not use drugs.    Medications  Home Medications     Reviewed by Brianne Haynes (Pharmacy Tech) on 04/18/19 at 5144  Med List Status: Unable to Obtain   Medication Last Dose Status   Ascorbic Acid (VITAMIN C) 1000 MG Tab  Active   aspirin (ASA) 81 MG Chew Tab chewable tablet  Active   cyclobenzaprine (FLEXERIL) 10 MG Tab  Active   finasteride (PROSCAR) 5 MG Tab  Active   glucosamine Sulfate 500 MG Cap  Active   lisinopril  (PRINIVIL) 20 MG Tab  Active   metoprolol SR (TOPROL XL) 25 MG TABLET SR 24 HR  Active   simvastatin (ZOCOR) 20 MG Tab  Active   tramadol (ULTRAM) 50 MG Tab  Active   vitamin D (CHOLECALCIFEROL) 1000 UNIT Tab  Active              Current Facility-Administered Medications   Medication Dose Route Frequency Provider Last Rate Last Dose   • fentaNYL (SUBLIMAZE) injection  mcg   mcg Intravenous Q HOUR PRN Kennedy Garsia M.D.       • fentaNYL (SUBLIMAZE) 50 mcg/mL in 50mL (Continuous Infusion)   Intravenous Continuous Kennedy Garsia M.D. 2 mL/hr at 04/19/19 0245 100 mcg/hr at 04/19/19 0245   • propofol (DIPRIVAN) injection  0-80 mcg/kg/min Intravenous Continuous Kennedy Garsia M.D. 2.6 mL/hr at 04/19/19 0505 5 mcg/kg/min at 04/19/19 0505   • niCARdipine (CARDENE) 25 mg in  mL Infusion  0-15 mg/hr Intravenous Continuous Farooq Vargas M.D.   Stopped at 04/18/19 2115   • senna-docusate (PERICOLACE or SENOKOT S) 8.6-50 MG per tablet 2 Tab  2 Tab Oral BID Shaka Akhtar M.D.   Stopped at 04/18/19 2315    And   • polyethylene glycol/lytes (MIRALAX) PACKET 1 Packet  1 Packet Oral QDAY PRN Shaka Akhtar M.D.        And   • magnesium hydroxide (MILK OF MAGNESIA) suspension 30 mL  30 mL Oral QDAY PRN Shaka Akhtar M.D.        And   • bisacodyl (DULCOLAX) suppository 10 mg  10 mg Rectal QDAY PRN Shaka Akhtar M.D.       • atorvastatin (LIPITOR) tablet 40 mg  40 mg Oral Q EVENING Shaka Akhtar M.D.       • aspirin (ASA) tablet 325 mg  325 mg Oral DAILY Shaka Akhtar M.D.        Or   • aspirin (ASA) chewable tab 324 mg  324 mg Oral DAILY Sahka Akhtar M.D.        Or   • aspirin (ASA) suppository 300 mg  300 mg Rectal DAILY Shaka Akhtar M.D.       • Respiratory Care per Protocol   Nebulization Continuous RT Shaka Akhtar M.D.       • NS infusion   Intravenous Continuous Shaka Akhtar M.D.   Stopped at 04/19/19 0315   • acetaminophen (TYLENOL) tablet 650 mg  650 mg Oral Q6HRS PRN Shaka Akhtar M.D.       •  ondansetron (ZOFRAN) syringe/vial injection 4 mg  4 mg Intravenous Q4HRS PRN Shaka Akhtar M.D.       • ondansetron (ZOFRAN ODT) dispertab 4 mg  4 mg Oral Q4HRS PRN Shaka Akhtar M.D.           Allergies  No Known Allergies    Vital Signs last 24 hours  Pulse:  [] 79  Resp:  [12-25] 17  BP: (154)/(91) 154/91  SpO2:  [87 %-100 %] 100 %    Physical Exam  Physical Exam   Constitutional: He appears well-developed and well-nourished. No distress.   HENT:   Head: Normocephalic.   Eyes: Pupils are equal, round, and reactive to light.   Neck: No JVD present.   Cardiovascular: Normal rate and regular rhythm.    No murmur heard.  Pulmonary/Chest: No respiratory distress. He has no wheezes. He has no rales.   Midline chest scar   Abdominal: He exhibits no distension. There is no tenderness. There is no rebound and no guarding.   Musculoskeletal: He exhibits no edema.   Neurological: No cranial nerve deficit. Coordination normal.   Sedated on propofol and fentanyl.    Skin: Skin is warm and dry. He is not diaphoretic. No erythema.       Fluids  No intake or output data in the 24 hours ending 04/19/19 0540    Laboratory  Recent Results (from the past 48 hour(s))   CBC WITH DIFFERENTIAL    Collection Time: 04/18/19  7:27 PM   Result Value Ref Range    WBC 8.3 4.8 - 10.8 K/uL    RBC 3.89 (L) 4.70 - 6.10 M/uL    Hemoglobin 13.5 (L) 14.0 - 18.0 g/dL    Hematocrit 37.2 (L) 42.0 - 52.0 %    MCV 95.6 81.4 - 97.8 fL    MCH 34.7 (H) 27.0 - 33.0 pg    MCHC 36.3 (H) 33.7 - 35.3 g/dL    RDW 41.4 35.9 - 50.0 fL    Platelet Count 260 164 - 446 K/uL    MPV 10.2 9.0 - 12.9 fL    Neutrophils-Polys 61.30 44.00 - 72.00 %    Lymphocytes 17.90 (L) 22.00 - 41.00 %    Monocytes 16.40 (H) 0.00 - 13.40 %    Eosinophils 2.20 0.00 - 6.90 %    Basophils 0.50 0.00 - 1.80 %    Immature Granulocytes 1.70 (H) 0.00 - 0.90 %    Nucleated RBC 0.00 /100 WBC    Neutrophils (Absolute) 5.10 1.82 - 7.42 K/uL    Lymphs (Absolute) 1.49 1.00 - 4.80 K/uL    Monos  (Absolute) 1.36 (H) 0.00 - 0.85 K/uL    Eos (Absolute) 0.18 0.00 - 0.51 K/uL    Baso (Absolute) 0.04 0.00 - 0.12 K/uL    Immature Granulocytes (abs) 0.14 (H) 0.00 - 0.11 K/uL    NRBC (Absolute) 0.00 K/uL   PROTHROMBIN TIME    Collection Time: 04/18/19  7:27 PM   Result Value Ref Range    PT 13.8 12.0 - 14.6 sec    INR 1.05 0.87 - 1.13   APTT    Collection Time: 04/18/19  7:27 PM   Result Value Ref Range    APTT 35.2 24.7 - 36.0 sec   COD (ADULT)    Collection Time: 04/18/19  7:27 PM   Result Value Ref Range    ABO Grouping Only O     Rh Grouping Only POS     Antibody Screen-Cod NEG    TROPONIN    Collection Time: 04/18/19  7:27 PM   Result Value Ref Range    Troponin I <0.01 0.00 - 0.04 ng/mL   Hemoglobin A1C    Collection Time: 04/18/19  7:27 PM   Result Value Ref Range    Glycohemoglobin 5.1 0.0 - 5.6 %    Est Avg Glucose 100 mg/dL   ABO AND RH CONFIRMATION    Collection Time: 04/18/19  8:22 PM   Result Value Ref Range    ABO Confirm O     Second Rh Group POS    Comp Metabolic Panel    Collection Time: 04/18/19  8:25 PM   Result Value Ref Range    Sodium 126 (L) 135 - 145 mmol/L    Potassium 3.9 3.6 - 5.5 mmol/L    Chloride 96 96 - 112 mmol/L    Co2 20 20 - 33 mmol/L    Anion Gap 10.0 0.0 - 11.9    Glucose 106 (H) 65 - 99 mg/dL    Bun 9 8 - 22 mg/dL    Creatinine 0.64 0.50 - 1.40 mg/dL    Calcium 8.9 8.5 - 10.5 mg/dL    AST(SGOT) 23 12 - 45 U/L    ALT(SGPT) 8 2 - 50 U/L    Alkaline Phosphatase 130 (H) 30 - 99 U/L    Total Bilirubin 0.6 0.1 - 1.5 mg/dL    Albumin 3.8 3.2 - 4.9 g/dL    Total Protein 7.1 6.0 - 8.2 g/dL    Globulin 3.3 1.9 - 3.5 g/dL    A-G Ratio 1.2 g/dL   ESTIMATED GFR    Collection Time: 04/18/19  8:25 PM   Result Value Ref Range    GFR If African American >60 >60 mL/min/1.73 m 2    GFR If Non African American >60 >60 mL/min/1.73 m 2   EKG (NOW)    Collection Time: 04/18/19  9:32 PM   Result Value Ref Range    Report       [facility] Emergency Dept.    Test Date:  2019-04-18  Pt  Name:    CHITRA PUCKETT               Department: ER  MRN:        [id number]                      Room:       [facility]  Gender:     Male                         Technician: [id number]  :        [id number]                   Requested By:KATY PANG  Order #:    [id number]                    Reading MD: KATY PANG MD    Measurements  Intervals                                Axis  Rate:       104                          P:          256  PA:         120                          QRS:        -63  QRSD:       98                           T:          104  QT:         356  QTc:        469    Interpretive Statements    Normal sinus rhythm 104 with a normal corrected QT interval normal QRS with  left  axis inferior Q waves poor R wave progression no old EKG for comparison  Electronically Signed On 2019 21:43:20 PDT by KATY PANG MD     ISTAT ARTERIAL BLOOD GAS    Collection Time: 19  1:09 AM   Result Value Ref Range    Ph 7.270 (LL) 7.400 - 7.500    Pco2 36.3 26.0 - 37.0 mmHg    Po2 183 (H) 64 - 87 mmHg    Tco2 18 (L) 20 - 33 mmol/L    S02 99 93 - 99 %    Hco3 16.7 (L) 17.0 - 25.0 mmol/L    BE -9 (L) -4 - 3 mmol/L    Body Temp 36.9 C degrees    O2 Therapy 50 %    iPF Ratio 366     Ph Temp Mitch 7.271 (LL) 7.400 - 7.500    Pco2 Temp Co 36.2 26.0 - 37.0 mmHg    Po2 Temp Cor 183 (H) 64 - 87 mmHg    Specimen Arterial     Action Range Triggered YES     Inst. Qualified Patient YES    ISTAT LACTATE    Collection Time: 19  1:09 AM   Result Value Ref Range    iStat Lactate 0.7 0.5 - 2.0 mmol/L   CBC with Differential    Collection Time: 19  2:49 AM   Result Value Ref Range    WBC 14.4 (H) 4.8 - 10.8 K/uL    RBC 3.66 (L) 4.70 - 6.10 M/uL    Hemoglobin 12.5 (L) 14.0 - 18.0 g/dL    Hematocrit 35.6 (L) 42.0 - 52.0 %    MCV 97.3 81.4 - 97.8 fL    MCH 34.2 (H) 27.0 - 33.0 pg    MCHC 35.1 33.7 - 35.3 g/dL    RDW 43.0 35.9 - 50.0 fL    Platelet Count 319 164 - 446 K/uL    MPV 9.6 9.0 - 12.9 fL     Neutrophils-Polys 81.40 (H) 44.00 - 72.00 %    Lymphocytes 7.40 (L) 22.00 - 41.00 %    Monocytes 9.90 0.00 - 13.40 %    Eosinophils 0.10 0.00 - 6.90 %    Basophils 0.20 0.00 - 1.80 %    Immature Granulocytes 1.00 (H) 0.00 - 0.90 %    Nucleated RBC 0.00 /100 WBC    Neutrophils (Absolute) 11.74 (H) 1.82 - 7.42 K/uL    Lymphs (Absolute) 1.06 1.00 - 4.80 K/uL    Monos (Absolute) 1.42 (H) 0.00 - 0.85 K/uL    Eos (Absolute) 0.01 0.00 - 0.51 K/uL    Baso (Absolute) 0.03 0.00 - 0.12 K/uL    Immature Granulocytes (abs) 0.14 (H) 0.00 - 0.11 K/uL    NRBC (Absolute) 0.00 K/uL   Lipid Panel    Collection Time: 19  2:49 AM   Result Value Ref Range    Cholesterol,Tot 98 (L) 100 - 199 mg/dL    Triglycerides 75 0 - 149 mg/dL    HDL 35 (A) >=40 mg/dL    LDL 48 <100 mg/dL   URINALYSIS CULTURE, IF INDICATED    Collection Time: 19  2:56 AM   Result Value Ref Range    Color Yellow     Character Clear     Specific Gravity 1.036 <1.035    Ph 5.5 5.0 - 8.0    Glucose Negative Negative mg/dL    Ketones 15 (A) Negative mg/dL    Protein 30 (A) Negative mg/dL    Bilirubin Negative Negative    Urobilinogen, Urine 0.2 Negative    Nitrite Negative Negative    Leukocyte Esterase Negative Negative    Occult Blood Large (A) Negative    Micro Urine Req Microscopic    URINE MICROSCOPIC (W/UA)    Collection Time: 19  2:56 AM   Result Value Ref Range    WBC 2-5 (A) /hpf    RBC >150 (A) /hpf    Bacteria Negative None /hpf    Epithelial Cells Negative /hpf    Hyaline Cast 0-2 /lpf   EKG (NOW)    Collection Time: 19  3:11 AM   Result Value Ref Range    Report       [facility] Emergency Dept.    Test Date:  2019  Pt Name:    CHITRA PUCKETT               Department: ER  MRN:        [id number]                      Room:        10  Gender:     Male                         Technician: [id number]  :        1935                   Requested By:CHARLINE ARAUJO  Order #:    [id number]                    Reading  MD:    Measurements  Intervals                                Axis  Rate:       90                           P:          73  ME:         228                          QRS:        -64  QRSD:       108                          T:          88  QT:         384  QTc:        470    Interpretive Statements  Sinus rhythm  Prolonged ME interval  Incomplete left bundle branch block  Inferior infarct, old  Compared to ECG 04/18/2019 21:32:29  First degree AV block now present  Left bundle-branch block now present  Myocardial infarct finding now present  Poor R-wave progression no longer present  Inferior Q waves no longer present  Q  waves no longer present     ISTAT ARTERIAL BLOOD GAS    Collection Time: 04/19/19  5:12 AM   Result Value Ref Range    Ph 7.384 (L) 7.400 - 7.500    Pco2 27.9 26.0 - 37.0 mmHg    Po2 105 (H) 64 - 87 mmHg    Tco2 17 (L) 20 - 33 mmol/L    S02 98 93 - 99 %    Hco3 16.6 (L) 17.0 - 25.0 mmol/L    BE -7 (L) -4 - 3 mmol/L    Body Temp 97.3 F degrees    O2 Therapy 40 %    iPF Ratio 263     Ph Temp Mitch 7.394 (L) 7.400 - 7.500    Pco2 Temp Co 27.0 26.0 - 37.0 mmHg    Po2 Temp Cor 101 (H) 64 - 87 mmHg    Specimen Arterial     Action Range Triggered NO     Inst. Qualified Patient YES        Imaging  DX-CHEST-PORTABLE (1 VIEW)   Final Result         1.  Mild pulmonary edema and/or infiltrates.      CT-HEAD W/O   Final Result         1.  No acute intracranial abnormality is identified, there are nonspecific white matter changes, commonly associated with small vessel ischemic disease.  Associated mild cerebral atrophy is noted.   2.  Atherosclerosis.      DX-CHEST-PORTABLE (1 VIEW)   Final Result         1.  No acute cardiopulmonary disease.      CT-CTA NECK WITH & W/O-POST PROCESSING   Final Result         1.  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   2.  Bilateral scattered carotid atherosclerosis, greatest at the right carotid bifurcation, resulting in less than 50% stenosis.   3.  Large cystic  structure in the posterior neck near the midline., Could represent seroma, sebaceous cyst, abscess, or other cystic lesion. Further evaluation with sonography for further characterization.      CT-CTA HEAD WITH & W/O-POST PROCESS   Final Result         1.  Atherosclerosis of the bilateral intracranial internal carotid arteries with less than 50% stenosis   2.  Hypoplastic right A1 segment.   3.  Hypoplastic distal bilateral vertebral arteries and basilar artery.   4.  Persistent fetal morphology of the bilateral posterior cerebral arteries.   5.  No aneurysm or occlusion.      CT-CEREBRAL PERFUSION ANALYSIS   Final Result      1.  Cerebral blood flow less than 30% likely representing completed infarct = 0 mL.      2.  T Max more than 6 seconds likely representing combination of completed infarct and ischemia = 4 mL.      3.  Mismatched volume likely representing ischemic brain/penumbra = 4 mL      4.  Please note that the cerebral perfusion was performed on the limited brain tissue around the basal ganglia region. Infarct/ischemia outside the CT perfusion sections can be missed in this study.      CT-HEAD W/O   Final Result      1.  No acute intracranial findings.      2.  Diffuse atrophy and periventricular white matter change, consistent with chronic small vessel disease.         CT-HEAD W/O    (Results Pending)   MR-BRAIN-W/O    (Results Pending)   CT-ABDOMEN-PELVIS WITH    (Results Pending)       Assessment/Plan  Stroke (HCC)- (present on admission)   Assessment & Plan    Last time normal: 1800 4/18  TPA given at: 2012  Goal if TPA SBP <180/105  Aspirin per protocol  HD intensity Statin f/u on lipid panel  DVT SCD's/prophylaxis per protocol  Nutrition at goal 48hrs  Blood glucose goal 150-180's check HgA1c  Rx fever  Neuro checks per protocol  Echo w/ bubble study  MRI pending    Patient with hypotensive use norepinephrine SBP > 105       Pelvic fracture (HCC)   Assessment & Plan    Per patient wife had pelvic  fracture 3/25 nurse described swelling and bruising of right testicle  Will get stat CT ABD pelvis to rule out pelvic hematoma s/p TPA will also check serial hg and TEG.      Hypotension   Assessment & Plan    Related to propofol and fentanyl will use norepinephrine to prevent hypotension and worsening ischemic injury SBP > 105  Will try to minimize sedation with more fentanyl then propofol.      Acute respiratory failure with hypoxia (HCC)   Assessment & Plan    Intubated date: 4/18 to get repeat CT for possible ICH post TPA  Goal saturation > 92%    Monitor pulse oximetry and adjust peep and FIO2 to abg/vbg, and peep optimization.  Monitor ventilator waveforms and titrate flow/peep and volumes according.   CXR: monitor lung volumes and tube/line placement  VAP bundle prevention, oral care, post pyloric feeding  Head of bed > 30 degree  GI prophylaxis  Daily awakening and SBT trials unless contraindicated  Monitor for liberation/extubation    Respiratory treatments: prn               Essential hypertension   Assessment & Plan    BP control < 180   Use nicardipine if necessary.      CAD (coronary artery disease)- (present on admission)   Assessment & Plan    Aspirin per protocol statin     Hyponatremia- (present on admission)   Assessment & Plan    Mild continue to monitor. Was given fluids in ER.          Discussed patient condition and risk of morbidity and/or mortality with Family and RN.    The patient remains critically ill form acute stroke and ventilator support.  Critical care time = 45 minutes in directly providing and coordinating critical care and extensive data review.  No time overlap and excludes procedures.

## 2019-04-19 NOTE — PROGRESS NOTES
Chief Complaint   Patient presents with   • Possible Stroke     Last known well at ~18:00. Left sided weakness and hemianopsia per EMS       Problem List Items Addressed This Visit     None      Visit Diagnoses     Acute CVA (cerebrovascular accident) (HCC)        Relevant Medications    metoprolol SR (TOPROL XL) 25 MG TABLET SR 24 HR    simvastatin (ZOCOR) 20 MG Tab    niCARdipine (CARDENE) 25 mg in  mL Infusion    atorvastatin (LIPITOR) tablet 40 mg (Start on 4/19/2019  6:00 PM)    norepinephrine (LEVOPHED) 8 mg in  mL Infusion (Completed)    lisinopril (PRINIVIL, ZESTRIL) 30 MG tablet    furosemide (LASIX) 20 MG Tab      Neurology- Stroke Progress Note    Brief History of present illness:  83-year old male with PMHx significant for CAD, CABG (remotely), hypertension, dyslipidemia, Right hip fracture on 3/25/19 (further details limited) who presented to [facility] on 4/18/19 for a chief complaint of Left UE/LE weakness suddenly at 1800; initial NIHSS 11. Patient determined to be a candidate for IV tPA and received at 2021 on 4/18/19. CTA without LVO. Patient reportedly became very agitated/uncooperative overnight, received multiple doses of Versed and Ativan and was ultimately intubated. Repeat CT Brain unremarkable. Intubated/sedated thus further ROS is limited.     Interval,  4/19/19:   Patient remains intubated/sedated. EEG at bedside. Awaiting MRI Brain wo contrast as well. Follows simple commands bilaterally, more briskly to Right. Patient now less agitated per nursing, planning to extubate this afternoon. Further ROS is limited secondary to patient's current state.     No changes to HPI as was documented on 4/18/19.     Past medical history:   Past Medical History:   Diagnosis Date   • CAD (coronary artery disease)    • History of open heart surgery    • Hypertension    • Pelvic fracture (HCC)        Past surgical history:   History reviewed. No pertinent surgical history.    Family  history:   History reviewed. No pertinent family history.    Social history:   Social History     Social History   • Marital status:      Spouse name: N/A   • Number of children: N/A   • Years of education: N/A     Occupational History   • Not on file.     Social History Main Topics   • Smoking status: Former Smoker     Types: Cigars   • Smokeless tobacco: Never Used   • Alcohol use Yes      Comment: occ   • Drug use: No   • Sexual activity: Not on file     Other Topics Concern   • Not on file     Social History Narrative   • No narrative on file       Current medications:   Current Facility-Administered Medications   Medication Dose   • fentaNYL (SUBLIMAZE) injection  mcg   mcg   • fentaNYL (SUBLIMAZE) 50 mcg/mL in 50mL (Continuous Infusion)     • propofol (DIPRIVAN) injection  0-80 mcg/kg/min   • niCARdipine (CARDENE) 25 mg in  mL Infusion  0-15 mg/hr   • senna-docusate (PERICOLACE or SENOKOT S) 8.6-50 MG per tablet 2 Tab  2 Tab    And   • polyethylene glycol/lytes (MIRALAX) PACKET 1 Packet  1 Packet    And   • magnesium hydroxide (MILK OF MAGNESIA) suspension 30 mL  30 mL    And   • bisacodyl (DULCOLAX) suppository 10 mg  10 mg   • atorvastatin (LIPITOR) tablet 40 mg  40 mg   • aspirin (ASA) tablet 325 mg  325 mg    Or   • aspirin (ASA) chewable tab 324 mg  324 mg    Or   • aspirin (ASA) suppository 300 mg  300 mg   • Respiratory Care per Protocol     • NS infusion     • acetaminophen (TYLENOL) tablet 650 mg  650 mg   • ondansetron (ZOFRAN) syringe/vial injection 4 mg  4 mg   • ondansetron (ZOFRAN ODT) dispertab 4 mg  4 mg       Medication Allergy:  No Known Allergies    Review of systems:   Limited as patient is intubated/sedated.     Physical examination:   Vitals:    04/19/19 0715 04/19/19 0730 04/19/19 0745 04/19/19 0800   BP:       Pulse: 91 87 85 91   Resp: 18 18 18 18   Temp:       TempSrc:       SpO2: 100% 100% 100% 100%   Weight:       Height:         General: Patient in no acute  distress.  HEENT: Normocephalic, no signs of acute trauma.   Neck: supple, no meningeal signs or carotid bruits. There is normal range of motion. No tenderness on exam.   Chest: clear to auscultation. No cough.   CV: RRR, no murmurs.   Skin: no signs of acute rashes or trauma.   Musculoskeletal: joints exhibit full range of motion, without any pain to palpation. There are no signs of joint or muscle swelling. There is no tenderness to deep palpation of muscles.   Psychiatric: No hallucinatory behavior. Denies symptoms of depression or suicidal ideation. Mood and affect appear normal on exam.     NEUROLOGICAL EXAM:   Mental status, orientation: Arousable to repeated vocal/nox stim.   Speech and language: No verbal output. Follows simple commands bilaterally, more briskly to Right.    Cranial nerve exam: Pupils are 2 mm bilaterally and equally reactive to light and accommodation. Visual fields are intact by confrontation/blink to visual threat bilaterally. Intact full EOM in all directions of gaze. Corneal reflexes intact bilaterally. Cough/gag reflex intact. Face appears symmetric. Tongue is midline and without any signs of tongue biting or fasciculations. Sternocleidomastoid muscles exhibit is normal strength bilaterally. Shoulder shrug is intact bilaterally.   Motor exam: Strength is 5/5 in RUE/RLE. 3+/5 to LUE/LLE. Tone is normal. No abnormal movements were seen on exam.   Sensory exam reveals normal sense of light touch and pinprick in all extremities.   Deep tendon reflexes:  2+ throughout. Plantar responses are flexor. There is no clonus.   Coordination: deferred as patient is unable to participate at this time.   Gait: Not assessed at this time as patient is a fall risk.       NIH Stroke Scale    1a Level of Consciousness 2  1b Orientation Questions 2  1c Response to Commands   2 Gaze   3 Visual Fields   4 Facial Movement   5 Motor Function (arm)   a Left 2  b Right   6 Motor Function (leg)   a Left 2  b Right    7 Limb Ataxia   8 Sensory   9 Language   10 Articulation   11 Extinction/Inattention     Score: 8  (limited exam as patient is intubated/sedated)    ANCILLARY DATA REVIEWED:     Lab Data Review:  Recent Results (from the past 24 hour(s))   CBC WITH DIFFERENTIAL    Collection Time: 04/18/19  7:27 PM   Result Value Ref Range    WBC 8.3 4.8 - 10.8 K/uL    RBC 3.89 (L) 4.70 - 6.10 M/uL    Hemoglobin 13.5 (L) 14.0 - 18.0 g/dL    Hematocrit 37.2 (L) 42.0 - 52.0 %    MCV 95.6 81.4 - 97.8 fL    MCH 34.7 (H) 27.0 - 33.0 pg    MCHC 36.3 (H) 33.7 - 35.3 g/dL    RDW 41.4 35.9 - 50.0 fL    Platelet Count 260 164 - 446 K/uL    MPV 10.2 9.0 - 12.9 fL    Neutrophils-Polys 61.30 44.00 - 72.00 %    Lymphocytes 17.90 (L) 22.00 - 41.00 %    Monocytes 16.40 (H) 0.00 - 13.40 %    Eosinophils 2.20 0.00 - 6.90 %    Basophils 0.50 0.00 - 1.80 %    Immature Granulocytes 1.70 (H) 0.00 - 0.90 %    Nucleated RBC 0.00 /100 WBC    Neutrophils (Absolute) 5.10 1.82 - 7.42 K/uL    Lymphs (Absolute) 1.49 1.00 - 4.80 K/uL    Monos (Absolute) 1.36 (H) 0.00 - 0.85 K/uL    Eos (Absolute) 0.18 0.00 - 0.51 K/uL    Baso (Absolute) 0.04 0.00 - 0.12 K/uL    Immature Granulocytes (abs) 0.14 (H) 0.00 - 0.11 K/uL    NRBC (Absolute) 0.00 K/uL   PROTHROMBIN TIME    Collection Time: 04/18/19  7:27 PM   Result Value Ref Range    PT 13.8 12.0 - 14.6 sec    INR 1.05 0.87 - 1.13   APTT    Collection Time: 04/18/19  7:27 PM   Result Value Ref Range    APTT 35.2 24.7 - 36.0 sec   COD (ADULT)    Collection Time: 04/18/19  7:27 PM   Result Value Ref Range    ABO Grouping Only O     Rh Grouping Only POS     Antibody Screen-Cod NEG    TROPONIN    Collection Time: 04/18/19  7:27 PM   Result Value Ref Range    Troponin I <0.01 0.00 - 0.04 ng/mL   Hemoglobin A1C    Collection Time: 04/18/19  7:27 PM   Result Value Ref Range    Glycohemoglobin 5.1 0.0 - 5.6 %    Est Avg Glucose 100 mg/dL   ABO AND RH CONFIRMATION    Collection Time: 04/18/19  8:22 PM   Result Value Ref Range     ABO Confirm O     Second Rh Group POS    Comp Metabolic Panel    Collection Time: 19  8:25 PM   Result Value Ref Range    Sodium 126 (L) 135 - 145 mmol/L    Potassium 3.9 3.6 - 5.5 mmol/L    Chloride 96 96 - 112 mmol/L    Co2 20 20 - 33 mmol/L    Anion Gap 10.0 0.0 - 11.9    Glucose 106 (H) 65 - 99 mg/dL    Bun 9 8 - 22 mg/dL    Creatinine 0.64 0.50 - 1.40 mg/dL    Calcium 8.9 8.5 - 10.5 mg/dL    AST(SGOT) 23 12 - 45 U/L    ALT(SGPT) 8 2 - 50 U/L    Alkaline Phosphatase 130 (H) 30 - 99 U/L    Total Bilirubin 0.6 0.1 - 1.5 mg/dL    Albumin 3.8 3.2 - 4.9 g/dL    Total Protein 7.1 6.0 - 8.2 g/dL    Globulin 3.3 1.9 - 3.5 g/dL    A-G Ratio 1.2 g/dL   ESTIMATED GFR    Collection Time: 19  8:25 PM   Result Value Ref Range    GFR If African American >60 >60 mL/min/1.73 m 2    GFR If Non African American >60 >60 mL/min/1.73 m 2   EKG (NOW)    Collection Time: 19  9:32 PM   Result Value Ref Range    Report       [facility] Emergency Dept.    Test Date:  2019  Pt Name:    CHITRA PUCKETT               Department: ER  MRN:        [id number]                      Room:       [facility]  Gender:     Male                         Technician: [id number]  :        1935                   Requested By:KATY PANG  Order #:    [id number]                    Reading MD: KATY PANG MD    Measurements  Intervals                                Axis  Rate:       104                          P:          256  TX:         120                          QRS:        -63  QRSD:       98                           T:          104  QT:         356  QTc:        469    Interpretive Statements    Normal sinus rhythm 104 with a normal corrected QT interval normal QRS with  left  axis inferior Q waves poor R wave progression no old EKG for comparison  Electronically Signed On 2019 21:43:20 PDT by KATY PANG MD     ISTAT ARTERIAL BLOOD GAS    Collection Time: 19  1:09 AM   Result Value Ref  Range    Ph 7.270 (LL) 7.400 - 7.500    Pco2 36.3 26.0 - 37.0 mmHg    Po2 183 (H) 64 - 87 mmHg    Tco2 18 (L) 20 - 33 mmol/L    S02 99 93 - 99 %    Hco3 16.7 (L) 17.0 - 25.0 mmol/L    BE -9 (L) -4 - 3 mmol/L    Body Temp 36.9 C degrees    O2 Therapy 50 %    iPF Ratio 366     Ph Temp Mitch 7.271 (LL) 7.400 - 7.500    Pco2 Temp Co 36.2 26.0 - 37.0 mmHg    Po2 Temp Cor 183 (H) 64 - 87 mmHg    Specimen Arterial     Action Range Triggered YES     Inst. Qualified Patient YES    ISTAT LACTATE    Collection Time: 04/19/19  1:09 AM   Result Value Ref Range    iStat Lactate 0.7 0.5 - 2.0 mmol/L   CBC with Differential    Collection Time: 04/19/19  2:49 AM   Result Value Ref Range    WBC 14.4 (H) 4.8 - 10.8 K/uL    RBC 3.66 (L) 4.70 - 6.10 M/uL    Hemoglobin 12.5 (L) 14.0 - 18.0 g/dL    Hematocrit 35.6 (L) 42.0 - 52.0 %    MCV 97.3 81.4 - 97.8 fL    MCH 34.2 (H) 27.0 - 33.0 pg    MCHC 35.1 33.7 - 35.3 g/dL    RDW 43.0 35.9 - 50.0 fL    Platelet Count 319 164 - 446 K/uL    MPV 9.6 9.0 - 12.9 fL    Neutrophils-Polys 81.40 (H) 44.00 - 72.00 %    Lymphocytes 7.40 (L) 22.00 - 41.00 %    Monocytes 9.90 0.00 - 13.40 %    Eosinophils 0.10 0.00 - 6.90 %    Basophils 0.20 0.00 - 1.80 %    Immature Granulocytes 1.00 (H) 0.00 - 0.90 %    Nucleated RBC 0.00 /100 WBC    Neutrophils (Absolute) 11.74 (H) 1.82 - 7.42 K/uL    Lymphs (Absolute) 1.06 1.00 - 4.80 K/uL    Monos (Absolute) 1.42 (H) 0.00 - 0.85 K/uL    Eos (Absolute) 0.01 0.00 - 0.51 K/uL    Baso (Absolute) 0.03 0.00 - 0.12 K/uL    Immature Granulocytes (abs) 0.14 (H) 0.00 - 0.11 K/uL    NRBC (Absolute) 0.00 K/uL   Lipid Panel    Collection Time: 04/19/19  2:49 AM   Result Value Ref Range    Cholesterol,Tot 98 (L) 100 - 199 mg/dL    Triglycerides 75 0 - 149 mg/dL    HDL 35 (A) >=40 mg/dL    LDL 48 <100 mg/dL   URINALYSIS CULTURE, IF INDICATED    Collection Time: 04/19/19  2:56 AM   Result Value Ref Range    Color Yellow     Character Clear     Specific Gravity 1.036 <1.035    Ph 5.5  5.0 - 8.0    Glucose Negative Negative mg/dL    Ketones 15 (A) Negative mg/dL    Protein 30 (A) Negative mg/dL    Bilirubin Negative Negative    Urobilinogen, Urine 0.2 Negative    Nitrite Negative Negative    Leukocyte Esterase Negative Negative    Occult Blood Large (A) Negative    Micro Urine Req Microscopic    URINE MICROSCOPIC (W/UA)    Collection Time: 19  2:56 AM   Result Value Ref Range    WBC 2-5 (A) /hpf    RBC >150 (A) /hpf    Bacteria Negative None /hpf    Epithelial Cells Negative /hpf    Hyaline Cast 0-2 /lpf   EKG (NOW)    Collection Time: 19  3:11 AM   Result Value Ref Range    Report       [facility] Emergency Dept.    Test Date:  2019  Pt Name:    CHITRA PUCKETT               Department: ER  MRN:        [id number]                      Room:       [facility]  Gender:     Male                         Technician: [id number]  :        1935                   Requested By:CHARLINE ARAUJO  Order #:    [id number]                    Reading MD:    Measurements  Intervals                                Axis  Rate:       90                           P:          73  MT:         228                          QRS:        -64  QRSD:       108                          T:          88  QT:         384  QTc:        470    Interpretive Statements  Sinus rhythm  Prolonged MT interval  Incomplete left bundle branch block  Inferior infarct, old  Compared to ECG 2019 21:32:29  First degree AV block now present  Left bundle-branch block now present  Myocardial infarct finding now present  Poor R-wave progression no longer present  Inferior Q waves no longer present  Q  waves no longer present     ISTAT ARTERIAL BLOOD GAS    Collection Time: 19  5:12 AM   Result Value Ref Range    Ph 7.384 (L) 7.400 - 7.500    Pco2 27.9 26.0 - 37.0 mmHg    Po2 105 (H) 64 - 87 mmHg    Tco2 17 (L) 20 - 33 mmol/L    S02 98 93 - 99 %    Hco3 16.6 (L) 17.0 - 25.0 mmol/L    BE -7 (L) -4 - 3 mmol/L     Body Temp 97.3 F degrees    O2 Therapy 40 %    iPF Ratio 263     Ph Temp Mitch 7.394 (L) 7.400 - 7.500    Pco2 Temp Co 27.0 26.0 - 37.0 mmHg    Po2 Temp Cor 101 (H) 64 - 87 mmHg    Specimen Arterial     Action Range Triggered NO     Inst. Qualified Patient YES        Labs reviewed by me.     Imaging reviewed by me:     CT-ABDOMEN-PELVIS WITH   Final Result         1.  Diffuse bladder wall thickening, consider cystitis, chronic bladder outlet obstruction, or other bladder wall pathology.   2.  Left inguinal hernia containing loop of colon without apparent obstruction.   3.  Linear densities in the lung bases suggests atelectasis, infiltrate not definitively excluded.   4.  Enlarged prostate, consider causes of prostate enlargement with additional workup as clinically appropriate.   5.  Large bilateral hydroceles, right greater than left.   6.  Diverticulosis   7.  Small supraumbilical hernia containing mesenteric fat.   8.  Atherosclerosis and atherosclerotic coronary artery disease.      DX-CHEST-PORTABLE (1 VIEW)   Final Result         1.  Mild pulmonary edema and/or infiltrates.      CT-HEAD W/O   Final Result         1.  No acute intracranial abnormality is identified, there are nonspecific white matter changes, commonly associated with small vessel ischemic disease.  Associated mild cerebral atrophy is noted.   2.  Atherosclerosis.      DX-CHEST-PORTABLE (1 VIEW)   Final Result         1.  No acute cardiopulmonary disease.      CT-CTA NECK WITH & W/O-POST PROCESSING   Final Result         1.  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   2.  Bilateral scattered carotid atherosclerosis, greatest at the right carotid bifurcation, resulting in less than 50% stenosis.   3.  Large cystic structure in the posterior neck near the midline., Could represent seroma, sebaceous cyst, abscess, or other cystic lesion. Further evaluation with sonography for further characterization.      CT-CTA HEAD WITH & W/O-POST  PROCESS   Final Result         1.  Atherosclerosis of the bilateral intracranial internal carotid arteries with less than 50% stenosis   2.  Hypoplastic right A1 segment.   3.  Hypoplastic distal bilateral vertebral arteries and basilar artery.   4.  Persistent fetal morphology of the bilateral posterior cerebral arteries.   5.  No aneurysm or occlusion.      CT-CEREBRAL PERFUSION ANALYSIS   Final Result      1.  Cerebral blood flow less than 30% likely representing completed infarct = 0 mL.      2.  T Max more than 6 seconds likely representing combination of completed infarct and ischemia = 4 mL.      3.  Mismatched volume likely representing ischemic brain/penumbra = 4 mL      4.  Please note that the cerebral perfusion was performed on the limited brain tissue around the basal ganglia region. Infarct/ischemia outside the CT perfusion sections can be missed in this study.      CT-HEAD W/O   Final Result      1.  No acute intracranial findings.      2.  Diffuse atrophy and periventricular white matter change, consistent with chronic small vessel disease.         CT-HEAD W/O    (Results Pending)   MR-BRAIN-W/O    (Results Pending)         Presumed mechanism by TOAST:  __Large Artery Atherosclerosis  __Small Vessel (Lacunar)  _x_Cardioembolic  __Other (Sickle Cell, Vasculitis, Hypercoagulable)  __Unknown    Vs small vessel.     ASSESSMENT AND PLAN:  83-year old male with PMHx significant for CAD, CABG (remotely), hypertension, dyslipidemia, Right hip fracture on 3/25/19 (further details limited) who presented to [facility] on 4/18/19 for a chief complaint of Left UE/LE weakness suddenly at 1800; NIHSS 11 at that time; received IV tPA at 2021 on 4/18/19. CTA with no LVO. Patient became uncooperative, profoundly agitated overnight, required sedation and was ultimately intubated. Remains intubated/sedated at time of exam this morning; NIHSS currently 8 however is limited. MRI Brain is pending.     Impression:    Acute ischemic stroke involving Right hemisphere; etiology unclear, likely cardioembolic vs. Small vessel.   Hypertension.   Dyslipidemia.   History of CAD and CABG.      Recommendations/Plan:     -Neuro assessment/VS per post-IV tPA protocol. SBP < 160.   -MRI Brain wo contrast is pending. Will follow up with results when available.   -No blood thinners/antithrombotics x 24 hours post tPA and no hemorrhage per post tPA imaging. Then may initiate ASA vs AC.   -Telemetry; currently SR with ectopy. Screen for Afib/Arrhythmia. Obtain TTE. Patient may require AC if suspicion for cardioembolic etiology remains high. Will determine when is appropriate to initiate AC depending on results of MRI.   -Atorvastatin 40 mg PO q HS. Note LDL 48.   -Recommend aggressive BG management per primary team. Avoid IVF with Dextrose. Hemoglobin A1c is pending.   -PT/OT/SLP eval and treat when appropriate.   -All other medical management per primary team.   -DVT Prophylaxis: SCDs.     The plan of care above has been discussed with Dr. Salgado.    Ilene Chinchilla MSN, BSN, AGNP-C  [facility]

## 2019-04-19 NOTE — PROCEDURES
ROUTINE ELECTROENCEPHALOGRAM REPORT      Referring provider: Dr. Salgado.     DOS: 4/19/2019 (total recording of 26 minutes)    INDICATION:  Tavo Ramos 83 y.o. male presenting with weakness.     TECHNIQUE: 30 channel routine electroencephalogram (EEG) was performed in accordance with the international 10-20 system. The study was reviewed in bipolar and referential montages. The recording examined the patient during wakeful and drowsy/sleep state(s).     DESCRIPTION OF THE RECORD:  During the wakefulness, the background showed a symmetrical 7 Hz theta activity posteriorly with amplitude of 70 mV.  There was reactivity to eye closure/opening.  A normal anterior-posterior gradient was noted with faster beta frequencies seen anteriorly.  During drowsiness, theta/delta frequencies were seen.    During the sleep state, background shows diffuse high-amplitude 4-5 Hz delta activity.     ACTIVATION PROCEDURES:   Intermittent Photic stimulation was performed in a stepwise fashion from 1 to 30 Hz and elicited a normal response (photic driving), most noticeable in the posterior leads.    ICTAL AND/OR INTERICTAL FINDINGS:   Frequent runs of FIRDA.     EKG: sampling of the EKG recording demonstrated sinus rhythm.       INTERPRETATION:  This is an abnormal routine EEG recording in the awake, drowsy, and sleep state(s). A mild encephalopathy is suggested. Frequent runs of FIRDA, suggesting either an underlying area of cortical irritability, a structural abnormality and/or increased intracranial pressure. The findings increase risk for seizures. Clinical and radiological correlation is recommended.      Dante Dunaway MD   Epilepsy and Neurodiagnostics.   Clinical  of Neurology [facility].   Diplomate in Neurology, Epilepsy, and Electrodiagnostic Medicine.   Office: [phone]  Fax: [phone]

## 2019-04-19 NOTE — PROGRESS NOTES
Dr Johnson by to see Pt updates given. Plan of care discussed. Md reviewed labs from 1052 CBC/ BMP results. Coags  from admission reviewed. Dr Johnson states to Dc TEG lab ordered.

## 2019-04-19 NOTE — ASSESSMENT & PLAN NOTE
Status post CABG and stent placement  Denied any chest pain  Continue aspirin 24 hours post TPA  Started on Lipitor 40

## 2019-04-19 NOTE — ED NOTES
At 2300hs the pt had shown a radical change in his mentation.  Pt was unable to answer NIH questions, was agitated and combative, lost bowel control and soiled self, coughing, attempting to pull out all wires/lines.  PT was given lorazepam and both ERP and hospitalist notified.  New head CT ordered.  Pt received additional versed with little effect.  After CT pt continued to be combative and required more lorazepam.  Pt continued to be combative, confused, hypertensive.  ERP at bedside and decision to intubate and sedate was made.

## 2019-04-19 NOTE — PROGRESS NOTES
Seen by Dr Garsia at 12:16 am on admission to ICU:    83 y.o. male who presented 4/18/2019 with right hip fracture 3/25, CABG x4, Stent x3, chol, cholecystectomy, htn, that about 4pm became real shaky the wife called EMS was found to have left sided weakness given TPA had a NIH 11 (left sided weakness and sensory loss, left visual field cut as well as facial droop) that improved to NIH 2 after TPA and then became acutely agitated and concerns for possible ICH post TPA so patient was intubated and good thing repeat CT Head was negative for acute pathology. Patient was still agitated on ventilator was started on propofol and fentanyl he became hypotensive even after stopping propofol and dropping fentanyl for 15 minutes levophed was started.      US bedside IVC 3.48cm with no respiratory change Good BiV function patient    Interval events:  TPA 8:21 pm  Left facial droop on arrival  Agitated  Intubated  Restless  Follows commands  Fentanyl 100 mcg/hr  On and off propofol  Needs piv  Abdomen and pelvis, bladder wall thickening  sbp 144/75 to 85/40s  Levophed, off at 5 am  t max 97.9F  Gi og, nothing per suction  Bilateral edema noted chest xray  Stop ns for now  3 ct scans, 2 with contrast  Fentanyl at 100  Negative ua  Vent day 1  Volume  sbt with apnea, on sedation  Aspirin tonight  No dvt px  Na 126  K 3.9  Repeat bmp, cbc, teg, mg  Reviewed abg    Addendum:  Agitated on sbt, not following commands very well but does if reoriented  Pending mri at 11 pm this evening  2 sbt  Cbc repeat ok, held on teg  Keep intubated overnight    Reviewed imaging    Reviewed labs    Assessment and plan  Stroke, received TPA remains in TPA window, some bleeding from lines.  CBC adequate.  MRI this evening. Moves all extremities and follows commands ifreoriented.  Hypoxic respiratory failure, on prop and fent drips, lean towards fent.  Failed sbt.  Continue ventilator.  Secretions, in tpa window, hold for now, will consider bronchoscopy  tomorrow if continue  Encephalopathy, likely due to sedation, hypsoics respiratory failure and stroke.  Wean sedation as tolerated.    Patient is critically ill. Remains on ventilator, unable to wean.  On drips for sedation.  Within 24 hour tpa window, high risk for catastrophic bleeding.  The critical that has been undertaken is medically complex. There has been no overlap in critical care time. Critical Care Time not including procedures: 32 minutes    .

## 2019-04-19 NOTE — H&P
Hospital Medicine History & Physical Note    Date of Service  4/18/2019    Primary Care Physician  No primary care provider on file.    Consultants  Pulmonology     Code Status  Full Code    Chief Complaint  Left sided weakness    History of Presenting Illness  83 y.o. male with a past medical history of CAD status post CABG x4 and stent placement x3, hypertension, hyperlipidemia who presented 4/18/2019 with sudden onset of left-sided weakness.  History is obtained from wife at bedside who noticed at 1800, the patient had acute onset of left facial droop, left upper and lower extremity weakness.  EMS was called and the patient was brought into the ER with an initial NIH of 11.  CT head without contrast revealed no acute intracranial findings.  The patient was evaluated by neurology and was given TPA at 2021.  CTA head and neck revealed no large vessel occlusion amenable to intervention.  The patient became agitated, disoriented and confused while in the ER.  He was given IV Ativan and Versed for sedation in order to facilitate a stat CT head.  Repeat CT head revealed no acute abnormality.  Patient had worsening agitation, became combative and was pulling on his lines therefore the decision was made to intubate and sedate the patient.  Patient will be admitted to the ICU.  Pulmonology was consulted by the ER physician.    EKG interpreted by me reveals sinus rhythm with nonspecific intraventricular conduction delay.  No ST elevation or ST depression noted  Chest x-ray interpreted by me reveals no acute cardiopulmonary process    Review of Systems  Review of Systems   Unable to perform ROS: Mental acuity       Past Medical History   has a past medical history of CAD (coronary artery disease); History of open heart surgery; Hypertension; and Pelvic fracture (HCC).    Surgical History  CABG    Family History  No pertinent family history    Social History   reports that he has quit smoking. His smoking use included  Cigars. He has never used smokeless tobacco. He reports that he drinks alcohol. He reports that he does not use drugs.    Allergies  No Known Allergies    Medications  Prior to Admission Medications   Prescriptions Last Dose Informant Patient Reported? Taking?   Ascorbic Acid (VITAMIN C) 1000 MG Tab   Yes Yes   Sig: Take  by mouth.   aspirin (ASA) 81 MG Chew Tab chewable tablet   Yes Yes   Sig: Take 81 mg by mouth every day.   cyclobenzaprine (FLEXERIL) 10 MG Tab   Yes Yes   Sig: Take 10 mg by mouth 3 times a day as needed.   finasteride (PROSCAR) 5 MG Tab   Yes Yes   Sig: Take 5 mg by mouth every day.   glucosamine Sulfate 500 MG Cap   Yes Yes   Sig: Take 1,500 mg by mouth 3 times a day, with meals.   lisinopril (PRINIVIL) 20 MG Tab   Yes Yes   Sig: Take 30 mg by mouth every day.   metoprolol SR (TOPROL XL) 25 MG TABLET SR 24 HR   Yes Yes   Sig: Take 25 mg by mouth every day.   simvastatin (ZOCOR) 20 MG Tab   Yes Yes   Sig: Take 20 mg by mouth every evening.   tramadol (ULTRAM) 50 MG Tab   Yes Yes   Sig: Take 50 mg by mouth every four hours as needed.   vitamin D (CHOLECALCIFEROL) 1000 UNIT Tab   Yes Yes   Sig: Take 1,000 Units by mouth every day.      Facility-Administered Medications: None       Physical Exam  Pulse:  [] 105  Resp:  [12-25] 17  BP: (154)/(91) 154/91  SpO2:  [90 %-100 %] 100 %    Physical Exam   Constitutional: He appears well-developed and well-nourished. No distress.   HENT:   Head: Normocephalic and atraumatic.   Mouth/Throat: Oropharynx is clear and moist.   Eyes: Pupils are equal, round, and reactive to light. Conjunctivae are normal. No scleral icterus.   Neck: Normal range of motion. Neck supple.   Cardiovascular: Normal rate, regular rhythm and normal heart sounds.    Pulmonary/Chest: Effort normal and breath sounds normal. No respiratory distress. He has no wheezes. He has no rales.   Abdominal: Soft. Bowel sounds are normal. He exhibits no distension. There is no tenderness. There  is no rebound.   Musculoskeletal: Normal range of motion. He exhibits no edema or tenderness.   Lymphadenopathy:     He has no cervical adenopathy.   Neurological: He is alert. Coordination normal.   Oriented to person only  Left upper extremity strength 4/5  Left lower extremity strength 4/5  Right sided strength intact     Skin: Skin is warm. No rash noted.   Psychiatric: His mood appears anxious. He is agitated and combative.   Nursing note and vitals reviewed.      Laboratory:  Recent Labs      04/18/19 1927   WBC  8.3   RBC  3.89*   HEMOGLOBIN  13.5*   HEMATOCRIT  37.2*   MCV  95.6   MCH  34.7*   MCHC  36.3*   RDW  41.4   PLATELETCT  260   MPV  10.2     Recent Labs      04/18/19 2025   SODIUM  126*   POTASSIUM  3.9   CHLORIDE  96   CO2  20   GLUCOSE  106*   BUN  9   CREATININE  0.64   CALCIUM  8.9     Recent Labs      04/18/19 2025   ALTSGPT  8   ASTSGOT  23   ALKPHOSPHAT  130*   TBILIRUBIN  0.6   GLUCOSE  106*     Recent Labs      04/18/19 1927   APTT  35.2   INR  1.05             Recent Labs      04/18/19 1927   TROPONINI  <0.01       Urinalysis:    No results found     Imaging:  DX-CHEST-PORTABLE (1 VIEW)   Final Result         1.  Mild pulmonary edema and/or infiltrates.      CT-HEAD W/O   Final Result         1.  No acute intracranial abnormality is identified, there are nonspecific white matter changes, commonly associated with small vessel ischemic disease.  Associated mild cerebral atrophy is noted.   2.  Atherosclerosis.      DX-CHEST-PORTABLE (1 VIEW)   Final Result         1.  No acute cardiopulmonary disease.      CT-CTA NECK WITH & W/O-POST PROCESSING   Final Result         1.  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   2.  Bilateral scattered carotid atherosclerosis, greatest at the right carotid bifurcation, resulting in less than 50% stenosis.   3.  Large cystic structure in the posterior neck near the midline., Could represent seroma, sebaceous cyst, abscess, or other  cystic lesion. Further evaluation with sonography for further characterization.      CT-CTA HEAD WITH & W/O-POST PROCESS   Final Result         1.  Atherosclerosis of the bilateral intracranial internal carotid arteries with less than 50% stenosis   2.  Hypoplastic right A1 segment.   3.  Hypoplastic distal bilateral vertebral arteries and basilar artery.   4.  Persistent fetal morphology of the bilateral posterior cerebral arteries.   5.  No aneurysm or occlusion.      CT-CEREBRAL PERFUSION ANALYSIS   Final Result      1.  Cerebral blood flow less than 30% likely representing completed infarct = 0 mL.      2.  T Max more than 6 seconds likely representing combination of completed infarct and ischemia = 4 mL.      3.  Mismatched volume likely representing ischemic brain/penumbra = 4 mL      4.  Please note that the cerebral perfusion was performed on the limited brain tissue around the basal ganglia region. Infarct/ischemia outside the CT perfusion sections can be missed in this study.      CT-HEAD W/O   Final Result      1.  No acute intracranial findings.      2.  Diffuse atrophy and periventricular white matter change, consistent with chronic small vessel disease.         CT-HEAD W/O    (Results Pending)   MR-BRAIN-W/O    (Results Pending)         Assessment/Plan:  I anticipate this patient will require at least two midnights for appropriate medical management, necessitating inpatient admission.    Stroke (HCC)- (present on admission)   Assessment & Plan    Status post TPA  Patient will be admitted to the ICU with neurochecks per TPA protocol  Patient will be placed on continuous cardiac monitoring to evaluate for any arrhythmias  I will order MRI brain for further evaluation  Check 2-D echo  Patient will be started on full dose aspirin 24 hours after TPA administration  Patient is started on high intensity atorvastatin  Strict blood pressure control with IV hydralazine for SBP greater than 180/105  IV fluid  hydration with normal saline  Check TSH, lipid panel and hemoglobin A1c  PTOT and ST evaluation  Neurology consulted           Essential hypertension   Assessment & Plan    Started on IV nicardipine for target SBP less than 180     CAD (coronary artery disease)- (present on admission)   Assessment & Plan    Status post CABG and stent placement  Denied any chest pain  Continue aspirin 24 hours post TPA  Started on Lipitor 40     Hyponatremia- (present on admission)   Assessment & Plan    Hypovolemic hyponatremia  IV fluid hydration with normal saline  Monitor BMP and assess response           VTE prophylaxis: SCD

## 2019-04-19 NOTE — CARE PLAN
Problem: Ventilation Defect:  Goal: Ability to achieve and maintain unassisted ventilation or tolerate decreased levels of ventilator support    Intervention: Support and monitor invasive and noninvasive mechanical ventilation  Adult Ventilation Update    Total Vent Days: 1  Vent: APVCMV 18/430/+8/30%  ETT: 8.0@25    Patient Lines/Drains/Airways Status    Active Airway     Name: Placement date: Placement time: Site: Days:    Airway ETT Oral 8.0 04/19/19   0005   Oral   1              In the last 24 hours, the patient tolerated SBT for 1hr on settings of 5/+8 30%.    Plateau Pressure (Q Shift): 17 (04/19/19 0711)  Static Compliance (ml / cm H2O): 52.3 (04/19/19 1517)    Patient failed trials because of    Barriers to SBT Weaning Trial Stopped due to:: Pt weaned for 1 hour and returned to rest settings per protocol (04/19/19 1235)  Length of Weaning Trial Length of Weaning Trial (Hours): 1hr (04/19/19 1235)    Sputum/Suction   Cough:  (no suctioning indicated) (04/19/19 1600)  Sputum Amount: Unable to Evaluate (04/19/19 1600)  Sputum Color: Bloody (04/19/19 1200)  Sputum Consistency: Thin (04/19/19 1200)      Events/Summary/Plan: Pt placed back on vent on rest settings after 1hr on spont.  (04/19/19 1235)    No further remarkable respiratory events over shift.

## 2019-04-19 NOTE — ED NOTES
Assist RN:  Alteplase started at 20:22 with pharmacist and Neurologist bedside.   Q15min neuro and vitals started.

## 2019-04-19 NOTE — CARE PLAN
Problem: Ventilation Defect:  Goal: Ability to achieve and maintain unassisted ventilation or tolerate decreased levels of ventilator support  Outcome: PROGRESSING SLOWER THAN EXPECTED    Intervention: Support and monitor invasive and noninvasive mechanical ventilation  Adult Ventilation Update    Total Vent Days: 1    Patient Lines/Drains/Airways Status    Active Airway     Name: Placement date: Placement time: Site: Days:    Airway ETT Oral 8.0 04/19/19   0005   Oral    1                       tacro on the upper limit but on its way down so will not adjust

## 2019-04-19 NOTE — ED NOTES
Pt had continued to complain of heartburn.  After vomiting, BP lowered significantly.  ERP notified, HOB lowered, NS bolus started.  Pt responding well.

## 2019-04-19 NOTE — PROGRESS NOTES
Unable to collect repeat  labs on Pt. All blood clotting in tubes. Lab called to have  draw BMP CBC, Mag, [place] mapping with TEg on Pt.

## 2019-04-19 NOTE — ED PROVIDER NOTES
ED Provider Note    11:39 PM -called to bedside for decreasing mental status.  Briefly this is a 83 y.o. male who is admitted for CVA.  He received TPA.  The patient is more confused and combative which is a new change in his mental status.  Stat head CT was ordered.  The patient was given Versed to sedate him for the CT scanner.      12:07 AM -patient had increasing delirium, disorientation and combativeness.  He received 2 doses of 2 mg Versed to sedate the patient and get him through the CT scanner.  On return he has some mild hypoxia and does not respond to verbal stimuli.  He still does respond to painful stimuli.  The case was discussed with the wife at bedside, decision was made for endotracheal intubation while we await the return of the CT as he is clearly decompensating.      -Procedure note:  Emergency consent (also a brief discussion with the patient's spouse).  Airway protection needed for deteriorating clinical status.  The patient was preoxygenated with 100% nonrebreather.  Following this he was sedated with 20 mg of etomidate and paralyzed with 100 mg of succinylcholine.  Using a MAC 4.0 blade I was able to visualize the cords and passed a 8.0 endotracheal tube without difficulty.    Tube placement was confirmed by end-tidal CO2, equal breath sounds, condensation in the tube, direct visualization and chest x-ray.  One attempt was successful without complications.    The patient was subsequently put on propofol for sedation.    Intubated.  PMA paged and updated with the medical course.  Notified of intubation..    12:36 AM -case discussed with Dr. Herring of radiology, he feels that it may possibly be due to seizure.  He agrees to continue the propofol for seizure prophylaxis.  CT head negative for ICH.

## 2019-04-19 NOTE — PROGRESS NOTES
Med rec complete per [facility] pharmacy  Allergies reviewed historically    Verified Metoprolol is a BID dose, unknown last doses taken

## 2019-04-19 NOTE — THERAPY
SPEECH PATHOLOGY NOTES:  Received order for swallow evaluation; however, Pt is currently intubated.  Spoke with RN who indicated that patient may be extubated later this afternoon.  Currently patient has NO NUTRITION source.  SLP to complete swallow evaluation as patient is appropriate.

## 2019-04-19 NOTE — PROGRESS NOTES
Updated Dr. Garsia about the patients increase in scrotal size and bright red urine. Received orders to do an abdominal CT with IV Contrast.

## 2019-04-19 NOTE — PROGRESS NOTES
AM bedside report received from NOC Rn. Plan of care discussed. Lines labs med's and orders reviewed. Neuro assessment completed. See Epic.

## 2019-04-19 NOTE — ASSESSMENT & PLAN NOTE
tpa 4/18  Required intubation for mental status  No stroke on imaging  Early tpa for duration of symptoms per wife  Extubated 4/20  precedex drip agitation  Wean to seroquel  Asa and statin  Significant deficits on arrival

## 2019-04-19 NOTE — DISCHARGE PLANNING
Medical Social Work     SW received a call from the RN requesting SW to check on the pt spouse. The spouse was be bedside but they escorted her to the family room because they had to intubate the pt. SW met with the pt in the family room and provided emotional support. SW answered all questions asked and provided water for the pt spouse Izabel. SW advised Izabel that we would get her back at bedside when they were done intubating the pt.     Izabel (Spouse) [phone]    Izabel was escorted back to the pt room.     Plan: SW will remain available for pt and family support.

## 2019-04-19 NOTE — THERAPY
Occupational Therapy Contact Note    Pt with active bedrest orders until 00:00 4/20/19. Will attempt OT evaluation once bedrest orders are lifted as able/appropriate.    Dorina Kleley, OTR/L

## 2019-04-19 NOTE — CONSULTS
Date of Admission: 4/18/2019    Today's Date: 04/18/19    Consulting Physician: Farooq Vagras M.D.    CC: Stroke/TIA teleconsult      HPI:      Tavo Ramos is a 83 y.o. male presents today in initial neurologic consultation for concern of acute stroke.  TLKW 1800 witnessed by wife (eva [phone], [phone]; I contacted both #s at 1937/1940 unable to connect) acute onset lef F/A/L severe weakness, improved mild in field, right gaze, left field cut, slurred able to communicate, EMS spoke with them [facility] paramedic. No antithrombotics. Aspirin 81mg due to cardiac hx/stent, possible afib on monitor- takes blood thinner for stent believes, does not get bloodwork every month, pink oval small- plavix suspected Karma PhD. , /110. Karma pharmD helps with above.      Time of symptom onset: 1800  TLKW: 1800  Associated symptoms: none   Alleviating/exacerbating factors: none   Blood glucose above per EMS.    Anticoagulation/Antiplatelet: aspirin    No other complaints.         ROS:   Constitutional: No fevers or chills.  Eyes: No blurry vision or eye pain.  ENT: No dysphagia or hearing loss.  Respiratory: No cough or shortness of breath.  Cardiovascular: No chest pain or palpitations.  GI: No nausea, vomiting, or diarrhea.  : No urinary incontinence or dysuria.  Musculoskeletal: No joint swelling or arthralgias.  Skin: No skin rashes.  Neuro: No headaches, dizziness, or tremors.  Endocrine: No heat or cold intolerance. No polydipsia or polyuria.  Psych: No depression or anxiety.  Heme/Lymph: No easy bruising or swollen lymph nodes.  All other review of systems were reviewed and were negative.     Past Medical History: No past medical history on file.    Past Surgical History: No past surgical history on file.    Social History:   Social History     Social History   • Marital status: N/A     Spouse name: N/A   • Number of children: N/A   • Years of education: N/A     Occupational History    • Not on file.     Social History Main Topics   • Smoking status: Not on file   • Smokeless tobacco: Not on file   • Alcohol use Not on file   • Drug use: Unknown   • Sexual activity: Not on file     Other Topics Concern   • Not on file     Social History Narrative   • No narrative on file       Family Hx: No family history on file.    Current Medications: No current facility-administered medications for this encounter.   No current outpatient prescriptions on file.    Allergies: Allergies not on file      Physical Exam:     There were no vitals filed for this visit.          Head/Neck: NCAT. no meningismus neg kernig neg brudzinski. No obvious mass or heard bruit. No tender arteries or lost pulses. No rash of head or neck. No JVD.    Skin: Warm, dry, intact. No rashes observed head/neck or body    Eyes/Funduscopic: unable    Mental Status: Awake, alert, oriented x 3 states march. Names/repeats/fluent/follows commands. Some visual and tactile neglect/extinction. Attention and concentration, recent & remote memory, Fund of Knowledge wnl.    Cranial Nerves: left facial droop severe, left field cut severe, left sens less other  CN II-XII intact.   EOM dev right. No nystagmus.       Motor: bulk  & tone wnl.  Left UE mod, LLE mild motor weakness, no abn mvmts     Sensory: loss to sharp left face and arm     Coordination: dysmetria absent     DTR's: intact/sym. no clonus. Toes mute.    Gait/Station: n/a fall concern       NIHSS score:  1a. LOC:   1b. LOC Questions: 1  1c. LOC Commands:   2. Best Gaze: 1  3. Visual Fields: 2  4. Facial Paresis: 2  5a. Motor arm left: 2  5b. Motor arm right:   6a. Motor leg left: 1  6b. Motor leg right:   7. Limb Ataxia:   8. Sensory: 1  9. Best Language:    10. Dysarthria:   11. Extinction/Inattention: 1    Total NIHSS: 11      Labs:                      No results for input(s): SODIUM, POTASSIUM, CHLORIDE, CO2, GLUCOSE, BUN, CPKTOTAL in the last 72 hours.  No results for input(s):  SODIUM, POTASSIUM, CHLORIDE, CO2, BUN, CREATININE, MAGNESIUM, PHOSPHORUS, CALCIUM in the last 72 hours.      No results found for this or any previous visit.            Imaging reviewed & visualized by me  DX-CHEST-PORTABLE (1 VIEW)    (Results Pending)   CT-HEAD W/O    (Results Pending)   CT-CEREBRAL PERFUSION ANALYSIS    (Results Pending)   CT-CTA HEAD WITH & W/O-POST PROCESS    (Results Pending)   CT-CTA NECK WITH & W/O-POST PROCESSING    (Results Pending)            Assessment/Plan:    Impression:  83 y.o. male with symptoms consistent with acute ischemic stroke, suspect small vessel tertiary occlusion by CPP map left post mca territory no core infarct only penumbra     STROKE RISKS: afib on monitor today, hx of cabg/stents, age      I discussed the risks & benefits of IV alteplase with patient and it was agreed to proceed.     Order to mix TPA at 1935, ok give 1949 after CTB wnl  144/81 prior to bolus    TPA given at: 2021    Delayed TPA administration due to: delay due to unknown blood thinner 2016 determined likely plavix and coags wnl    I also discussed risks & benefits of endovascular treatment with patient who gave consent.     not NEAL candidate no LVO.        Presumed mechanism by TOAST:  __Large Artery Atherosclerosis  __Small Vessel (Lacunar)  _x_Cardioembolic  __Other (Sickle Cell, Vasculitis, Hypercoagulable)  __Unknown      Recommendations:    Admit to the ICU with post-TPA protocol    SBP <180/<105 UNLESS NEAL NEED OTHERWISE    HOB flat if can    NS 50CC/HR    Avoid antiplatelets & anticoagulants for 24 hours after TPA. ASSURE NO ICH PRIOR TO START: CTB 24HRS POST IVT    Nothing by mouth until cleared by speech therapy    PT/OT, PMR CONSULT    MRB IN AM     55 minutes of critical care time were spent in caring for this patient with acute ischemic stroke receiving thrombolytics going to endovascular treatment with high risk of worsening brain function or death.        Alton Herring M.D.  Section  Chief, Neurohospitalist & Stroke  Clinical Professor, [employer]  Diplomate, Neurology & Neuroimaging

## 2019-04-19 NOTE — PROGRESS NOTES
Dr Castanon paged and updated on RN and  unable to obtain labs, TEG. Md updated on EEG results. MD states he will be by to see Pt soon and will attempt to draw TEG for Rn or possibly place line if needed.  MD updated on BMP and Mag results.

## 2019-04-19 NOTE — ED NOTES
Unable to complete Med Rec at this time  Allergies Reviewed Historically    Pt's list at bedside not current  Home Pharmacy closed

## 2019-04-19 NOTE — ASSESSMENT & PLAN NOTE
Status post TPA  Patient will be admitted to the ICU with neurochecks per TPA protocol  Patient will be placed on continuous cardiac monitoring to evaluate for any arrhythmias  I will order MRI brain for further evaluation  Check 2-D echo  Patient will be started on full dose aspirin 24 hours after TPA administration  Patient is started on high intensity atorvastatin  Strict blood pressure control with IV hydralazine for SBP greater than 180/105  IV fluid hydration with normal saline  Check TSH, lipid panel and hemoglobin A1c  PTOT and ST evaluation  Neurology consulted

## 2019-04-19 NOTE — DISCHARGE PLANNING
PMR order received from Dr. Akhtar.  No medical provider is identified @ this time.  Please have PFA screen for a potential medical provider if they have not all ready.  Presented with stroke-like sx.  He received TPA. Currently vented.  W/U & TX evals are pending.  This referral will not be forwarded to Physiatry @ this time.  TCC remains monitoring.  I do appreciate the referral.

## 2019-04-19 NOTE — ASSESSMENT & PLAN NOTE
Intubated 4/18  Extubated 4/20  Rt/o2 protocol  Weaned sedation  Tolerated  seconary to altered mental status from encephalopathy  precedex for agitation

## 2019-04-20 ENCOUNTER — APPOINTMENT (OUTPATIENT)
Dept: RADIOLOGY | Facility: MEDICAL CENTER | Age: 84
DRG: 061 | End: 2019-04-20
Attending: INTERNAL MEDICINE
Payer: MEDICARE

## 2019-04-20 LAB
ACTION RANGE TRIGGERED IACRT: NO
ANION GAP SERPL CALC-SCNC: 10 MMOL/L (ref 0–11.9)
BASE EXCESS BLDA CALC-SCNC: -6 MMOL/L (ref -4–3)
BASOPHILS # BLD AUTO: 0.5 % (ref 0–1.8)
BASOPHILS # BLD: 0.05 K/UL (ref 0–0.12)
BODY TEMPERATURE: ABNORMAL DEGREES
BUN SERPL-MCNC: 10 MG/DL (ref 8–22)
CALCIUM SERPL-MCNC: 8.6 MG/DL (ref 8.5–10.5)
CHLORIDE SERPL-SCNC: 106 MMOL/L (ref 96–112)
CO2 BLDA-SCNC: 18 MMOL/L (ref 20–33)
CO2 SERPL-SCNC: 19 MMOL/L (ref 20–33)
CREAT SERPL-MCNC: 0.85 MG/DL (ref 0.5–1.4)
EOSINOPHIL # BLD AUTO: 0.45 K/UL (ref 0–0.51)
EOSINOPHIL NFR BLD: 4.5 % (ref 0–6.9)
ERYTHROCYTE [DISTWIDTH] IN BLOOD BY AUTOMATED COUNT: 44.5 FL (ref 35.9–50)
GLUCOSE SERPL-MCNC: 110 MG/DL (ref 65–99)
HCO3 BLDA-SCNC: 17 MMOL/L (ref 17–25)
HCT VFR BLD AUTO: 31.5 % (ref 42–52)
HGB BLD-MCNC: 10.9 G/DL (ref 14–18)
HOROWITZ INDEX BLDA+IHG-RTO: 343 MM[HG]
IMM GRANULOCYTES # BLD AUTO: 0.09 K/UL (ref 0–0.11)
IMM GRANULOCYTES NFR BLD AUTO: 0.9 % (ref 0–0.9)
INST. QUALIFIED PATIENT IIQPT: YES
LYMPHOCYTES # BLD AUTO: 1.39 K/UL (ref 1–4.8)
LYMPHOCYTES NFR BLD: 13.9 % (ref 22–41)
MAGNESIUM SERPL-MCNC: 1.8 MG/DL (ref 1.5–2.5)
MCH RBC QN AUTO: 34.3 PG (ref 27–33)
MCHC RBC AUTO-ENTMCNC: 34.6 G/DL (ref 33.7–35.3)
MCV RBC AUTO: 99.1 FL (ref 81.4–97.8)
MONOCYTES # BLD AUTO: 1.6 K/UL (ref 0–0.85)
MONOCYTES NFR BLD AUTO: 16 % (ref 0–13.4)
NEUTROPHILS # BLD AUTO: 6.44 K/UL (ref 1.82–7.42)
NEUTROPHILS NFR BLD: 64.2 % (ref 44–72)
NRBC # BLD AUTO: 0 K/UL
NRBC BLD-RTO: 0 /100 WBC
O2/TOTAL GAS SETTING VFR VENT: 30 %
PCO2 BLDA: 26.8 MMHG (ref 26–37)
PCO2 TEMP ADJ BLDA: 26.8 MMHG (ref 26–37)
PH BLDA: 7.41 [PH] (ref 7.4–7.5)
PH TEMP ADJ BLDA: 7.41 [PH] (ref 7.4–7.5)
PHOSPHATE SERPL-MCNC: 3.2 MG/DL (ref 2.5–4.5)
PLATELET # BLD AUTO: 252 K/UL (ref 164–446)
PMV BLD AUTO: 9.9 FL (ref 9–12.9)
PO2 BLDA: 103 MMHG (ref 64–87)
PO2 TEMP ADJ BLDA: 103 MMHG (ref 64–87)
POTASSIUM SERPL-SCNC: 3.6 MMOL/L (ref 3.6–5.5)
RBC # BLD AUTO: 3.18 M/UL (ref 4.7–6.1)
SAO2 % BLDA: 98 % (ref 93–99)
SODIUM SERPL-SCNC: 135 MMOL/L (ref 135–145)
SPECIMEN DRAWN FROM PATIENT: ABNORMAL
WBC # BLD AUTO: 10 K/UL (ref 4.8–10.8)

## 2019-04-20 PROCEDURE — 85025 COMPLETE CBC W/AUTO DIFF WBC: CPT

## 2019-04-20 PROCEDURE — 84100 ASSAY OF PHOSPHORUS: CPT

## 2019-04-20 PROCEDURE — 71045 X-RAY EXAM CHEST 1 VIEW: CPT

## 2019-04-20 PROCEDURE — 700101 HCHG RX REV CODE 250: Performed by: INTERNAL MEDICINE

## 2019-04-20 PROCEDURE — 83735 ASSAY OF MAGNESIUM: CPT

## 2019-04-20 PROCEDURE — 700111 HCHG RX REV CODE 636 W/ 250 OVERRIDE (IP): Performed by: INTERNAL MEDICINE

## 2019-04-20 PROCEDURE — 94003 VENT MGMT INPAT SUBQ DAY: CPT

## 2019-04-20 PROCEDURE — 99232 SBSQ HOSP IP/OBS MODERATE 35: CPT | Performed by: PSYCHIATRY & NEUROLOGY

## 2019-04-20 PROCEDURE — 51798 US URINE CAPACITY MEASURE: CPT

## 2019-04-20 PROCEDURE — 770022 HCHG ROOM/CARE - ICU (200)

## 2019-04-20 PROCEDURE — 36600 WITHDRAWAL OF ARTERIAL BLOOD: CPT

## 2019-04-20 PROCEDURE — 94150 VITAL CAPACITY TEST: CPT

## 2019-04-20 PROCEDURE — A9270 NON-COVERED ITEM OR SERVICE: HCPCS | Performed by: INTERNAL MEDICINE

## 2019-04-20 PROCEDURE — 700102 HCHG RX REV CODE 250 W/ 637 OVERRIDE(OP): Performed by: INTERNAL MEDICINE

## 2019-04-20 PROCEDURE — 82803 BLOOD GASES ANY COMBINATION: CPT

## 2019-04-20 PROCEDURE — 99291 CRITICAL CARE FIRST HOUR: CPT | Performed by: INTERNAL MEDICINE

## 2019-04-20 PROCEDURE — 80048 BASIC METABOLIC PNL TOTAL CA: CPT

## 2019-04-20 RX ORDER — FUROSEMIDE 10 MG/ML
40 INJECTION INTRAMUSCULAR; INTRAVENOUS ONCE
Status: COMPLETED | OUTPATIENT
Start: 2019-04-20 | End: 2019-04-20

## 2019-04-20 RX ORDER — HYDRALAZINE HYDROCHLORIDE 20 MG/ML
10 INJECTION INTRAMUSCULAR; INTRAVENOUS EVERY 4 HOURS PRN
Status: DISCONTINUED | OUTPATIENT
Start: 2019-04-20 | End: 2019-04-21 | Stop reason: HOSPADM

## 2019-04-20 RX ORDER — HYDRALAZINE HYDROCHLORIDE 20 MG/ML
20 INJECTION INTRAMUSCULAR; INTRAVENOUS ONCE
Status: DISCONTINUED | OUTPATIENT
Start: 2019-04-20 | End: 2019-04-20

## 2019-04-20 RX ORDER — MAGNESIUM SULFATE HEPTAHYDRATE 40 MG/ML
2 INJECTION, SOLUTION INTRAVENOUS ONCE
Status: COMPLETED | OUTPATIENT
Start: 2019-04-20 | End: 2019-04-20

## 2019-04-20 RX ORDER — LISINOPRIL 10 MG/1
10 TABLET ORAL
Status: DISCONTINUED | OUTPATIENT
Start: 2019-04-20 | End: 2019-04-21 | Stop reason: HOSPADM

## 2019-04-20 RX ORDER — POTASSIUM CHLORIDE 7.45 MG/ML
10 INJECTION INTRAVENOUS
Status: COMPLETED | OUTPATIENT
Start: 2019-04-20 | End: 2019-04-20

## 2019-04-20 RX ORDER — DEXMEDETOMIDINE HYDROCHLORIDE 4 UG/ML
.1-1.5 INJECTION, SOLUTION INTRAVENOUS CONTINUOUS
Status: DISCONTINUED | OUTPATIENT
Start: 2019-04-20 | End: 2019-04-21 | Stop reason: HOSPADM

## 2019-04-20 RX ADMIN — FENTANYL CITRATE 100 MCG: 50 INJECTION INTRAMUSCULAR; INTRAVENOUS at 12:36

## 2019-04-20 RX ADMIN — FAMOTIDINE 20 MG: 10 INJECTION INTRAVENOUS at 04:53

## 2019-04-20 RX ADMIN — FENTANYL CITRATE 100 MCG: 50 INJECTION INTRAMUSCULAR; INTRAVENOUS at 01:45

## 2019-04-20 RX ADMIN — PROPOFOL 35 MCG/KG/MIN: 10 INJECTION, EMULSION INTRAVENOUS at 05:10

## 2019-04-20 RX ADMIN — MAGNESIUM SULFATE IN WATER 2 G: 40 INJECTION, SOLUTION INTRAVENOUS at 08:14

## 2019-04-20 RX ADMIN — DEXMEDETOMIDINE HYDROCHLORIDE 0.5 MCG/KG/HR: 100 INJECTION, SOLUTION INTRAVENOUS at 13:30

## 2019-04-20 RX ADMIN — ATORVASTATIN CALCIUM 40 MG: 40 TABLET, FILM COATED ORAL at 19:15

## 2019-04-20 RX ADMIN — FENTANYL CITRATE 100 MCG: 50 INJECTION INTRAMUSCULAR; INTRAVENOUS at 00:43

## 2019-04-20 RX ADMIN — POTASSIUM CHLORIDE 10 MEQ: 7.46 INJECTION, SOLUTION INTRAVENOUS at 10:07

## 2019-04-20 RX ADMIN — FUROSEMIDE 40 MG: 10 INJECTION, SOLUTION INTRAMUSCULAR; INTRAVENOUS at 10:07

## 2019-04-20 RX ADMIN — ASPIRIN 300 MG: 300 SUPPOSITORY RECTAL at 00:18

## 2019-04-20 RX ADMIN — PROPOFOL 20 MCG/KG/MIN: 10 INJECTION, EMULSION INTRAVENOUS at 00:18

## 2019-04-20 RX ADMIN — POTASSIUM CHLORIDE 10 MEQ: 7.46 INJECTION, SOLUTION INTRAVENOUS at 08:18

## 2019-04-20 RX ADMIN — HYDRALAZINE HYDROCHLORIDE 10 MG: 20 INJECTION INTRAMUSCULAR; INTRAVENOUS at 12:12

## 2019-04-20 RX ADMIN — SENNOSIDES,DOCUSATE SODIUM 2 TABLET: 8.6; 5 TABLET, FILM COATED ORAL at 19:15

## 2019-04-20 ASSESSMENT — PULMONARY FUNCTION TESTS: FVC: 1.25

## 2019-04-20 NOTE — CARE PLAN
Problem: Venous Thromboembolism (VTW)/Deep Vein Thrombosis (DVT) Prevention:  Goal: Patient will participate in Venous Thrombosis (VTE)/Deep Vein Thrombosis (DVT)Prevention Measures    Intervention: Ensure patient wears graduated elastic stockings ([place] hose) and/or SCDs, if ordered, when in bed or chair (Remove at least once per shift for skin check)  Pt status post tpa  Needs 24 hours before he can have anticoagulation of any kind,no invasive lines or blood draws x 24 hrs

## 2019-04-20 NOTE — PROGRESS NOTES
Critical Care Progress Note    Date of admission  4/18/2019    Chief Complaint  83 y.o. male who presented 4/18/2019 with right hip fracture 3/25, CABG x4, Stent x3, chol, cholecystectomy, htn, that about 4pm became real shaky the wife called EMS was found to have left sided weakness given TPA had a NIH 11 (left sided weakness and sensory loss, left visual field cut as well as facial droop) that improved to NIH 2 after TPA and then became acutely agitated and concerns for possible ICH post TPA so patient was intubated and good thing repeat CT Head was negative for acute pathology. Patient was still agitated on ventilator was started on propofol and fentanyl he became hypotensive even after stopping propofol and dropping fentanyl for 15 minutes levophed was started.      US bedside IVC 3.48cm with no respiratory change Good BiV function patient    Hospital Course          Interval Problem Update  Reviewed last 24 hour events:  No stroke per neuro  On vent  Follows  No significant secretions  Continue asa and statin  Prop and fent  sbp up and down  Pulled out og  Minimal secretions  300 urine out overnight  3 peripheral IV  cxray ok  Lasix 40 mg daily IV  Vent day 2  sbt 1 hour 5/8  fvc 1.25  nif -35  rsbi 25  Extubate    Addendum:  Tolerated extubation  Remained agitated  precedex drip started, wean as tolerated  sbp elevated, added anti hypertensives      Review of Systems  Review of Systems   Unable to perform ROS: Intubated        Vital Signs for last 24 hours   Temp:  [36.5 °C (97.7 °F)-37 °C (98.6 °F)] 36.9 °C (98.5 °F)  Pulse:  [] 90  Resp:  [11-36] 18  SpO2:  [63 %-100 %] 63 %    Hemodynamic parameters for last 24 hours       Respiratory Information for the last 24 hours  Barba Vent Mode: Spont  Rate (breaths/min): 18  Vt Target (mL): 430  PEEP/CPAP: 8  FiO2: 30  P Support: 5  P MEAN: 9.7  Length of Weaning Trial (Hours): 1hr  Control VTE (exp VT): 590    Physical Exam   Physical Exam    Constitutional: He appears distressed.   HENT:   Head: Normocephalic and atraumatic.   Right Ear: External ear normal.   Left Ear: External ear normal.   Mouth/Throat: No oropharyngeal exudate.   Eyes: Pupils are equal, round, and reactive to light. Conjunctivae and EOM are normal. Right eye exhibits no discharge. Left eye exhibits no discharge.   Neck: No JVD present. No tracheal deviation present.   Cardiovascular: Normal rate, regular rhythm and normal heart sounds.    No murmur heard.  Pulmonary/Chest: Breath sounds normal. No stridor. He is in respiratory distress. He has no wheezes. He has no rales. He exhibits no tenderness.   Abdominal: He exhibits no mass. There is no tenderness. There is no rebound and no guarding.   Musculoskeletal: He exhibits edema. He exhibits no tenderness or deformity.   Neurological: He is alert. He displays normal reflexes. No cranial nerve deficit. Coordination normal.   Skin: Skin is warm. No rash noted. He is diaphoretic. No erythema.   Psychiatric: He has a normal mood and affect. His behavior is normal.   Nursing note and vitals reviewed.      Medications  Current Facility-Administered Medications   Medication Dose Route Frequency Provider Last Rate Last Dose   • fentaNYL (SUBLIMAZE) injection  mcg   mcg Intravenous Q HOUR PRN Kennedy Garsia M.D.   100 mcg at 04/20/19 0145   • fentaNYL (SUBLIMAZE) 50 mcg/mL in 50mL (Continuous Infusion)   Intravenous Continuous Kennedy Garsia M.D.   Stopped at 04/20/19 0605   • propofol (DIPRIVAN) injection  0-80 mcg/kg/min Intravenous Continuous Kennedy Garsia M.D. 5.2 mL/hr at 04/20/19 0702 10 mcg/kg/min at 04/20/19 0702   • MD Alert...ICU Electrolyte Replacement per Pharmacy   Other PHARMACY TO DOSE Jayden JUAN Elizabeth       • Pharmacy Consult: Enteral tube insertion - review meds/change route/product selection   Other PHARMACY TO DOSE Jayden K Elizabeth       • aspirin (ASA) chewable tab 324 mg  324 mg Enteral Tube DAILY Jayden  K Elizabeth        Or   • aspirin (ASA) tablet 325 mg  325 mg Enteral Tube DAILY Jayden K Elizabeth        Or   • aspirin (ASA) suppository 300 mg  300 mg Rectal DAILY Jayden K Elizabeth   300 mg at 04/20/19 0018   • atorvastatin (LIPITOR) tablet 40 mg  40 mg Enteral Tube Q EVENING Jayden K Elizabeth   Stopped at 04/19/19 1800   • famotidine (PEPCID) tablet 20 mg  20 mg Enteral Tube Q12HRS Jayden K Elizabeth        Or   • famotidine (PEPCID) injection 20 mg  20 mg Intravenous Q12HRS Jayden K Elizbaeth   20 mg at 04/20/19 0453   • senna-docusate (PERICOLACE or SENOKOT S) 8.6-50 MG per tablet 2 Tab  2 Tab Enteral Tube BID Jayden K Elizabeth   Stopped at 04/19/19 1800    And   • polyethylene glycol/lytes (MIRALAX) PACKET 1 Packet  1 Packet Enteral Tube QDAY PRN Jayden K Elizabeth        And   • magnesium hydroxide (MILK OF MAGNESIA) suspension 30 mL  30 mL Enteral Tube QDAY PRN Jayden K Elizabeth        And   • bisacodyl (DULCOLAX) suppository 10 mg  10 mg Rectal QDAY PRN Jayden K Elizabeth       • acetaminophen (TYLENOL) tablet 650 mg  650 mg Enteral Tube Q6HRS PRN Jayden K Elizabeth       • niCARdipine (CARDENE) 25 mg in  mL Infusion  0-15 mg/hr Intravenous Continuous Farooq Vargas M.D.   Stopped at 04/18/19 2115   • Respiratory Care per Protocol   Nebulization Continuous RT Shaka Akhtar M.D.       • ondansetron (ZOFRAN) syringe/vial injection 4 mg  4 mg Intravenous Q4HRS PRN Shaka Akhtar M.D.   4 mg at 04/19/19 1655   • ondansetron (ZOFRAN ODT) dispertab 4 mg  4 mg Oral Q4HRS PRN Shaak Akhtar M.D.           Fluids    Intake/Output Summary (Last 24 hours) at 04/20/19 0709  Last data filed at 04/20/19 0600   Gross per 24 hour   Intake           1458.2 ml   Output             1495 ml   Net            -36.8 ml       Laboratory  Recent Labs      04/19/19   0109  04/19/19   0512  04/20/19   0339   ISTATAPH  7.270*  7.384*  7.412   ISTATAPCO2  36.3  27.9  26.8   ISTATAPO2  183*  105*  103*   ISTATATCO2  18*  17*  18*   [id number]  99  98  98   ISTATARTHCO3  16.7*   16.6*  17.0   ISTATARTBE  -9*  -7*  -6*   ISTATTEMP  36.9 C  97.3 F  37.0 C   ISTATFIO2  50  40  30   ISTATSPEC  Arterial  Arterial  Arterial   ISTATAPHTC  7.271*  7.394*  7.412   [id number]  183*  101*  103*     Recent Labs      04/18/19 1927   TROPONINI  <0.01     Recent Labs      04/18/19 2025 04/19/19 1052 04/20/19   0458   SODIUM  126*  135  135   POTASSIUM  3.9  4.1  3.6   CHLORIDE  96  104  106   CO2  20  20  19*   BUN  9  11  10   CREATININE  0.64  0.75  0.85   MAGNESIUM   --   1.9  1.8   PHOSPHORUS   --    --   3.2   CALCIUM  8.9  8.3*  8.6     Recent Labs      04/18/19 2025 04/19/19 1052 04/20/19 0458   ALTSGPT  8   --    --    ASTSGOT  23   --    --    ALKPHOSPHAT  130*   --    --    TBILIRUBIN  0.6   --    --    GLUCOSE  106*  97  110*     Recent Labs      04/18/19 2025 04/19/19 0249 04/19/19 1052 04/20/19 0458   WBC   --   14.4*  12.9*  10.0   NEUTSPOLYS   --   81.40*  69.20  64.20   LYMPHOCYTES   --   7.40*  13.30*  13.90*   MONOCYTES   --   9.90  14.20*  16.00*   EOSINOPHILS   --   0.10  1.70  4.50   BASOPHILS   --   0.20  0.00  0.50   ASTSGOT  23   --    --    --    ALTSGPT  8   --    --    --    ALKPHOSPHAT  130*   --    --    --    TBILIRUBIN  0.6   --    --    --      Recent Labs      04/18/19 1927 04/19/19 0249 04/19/19 1052 04/20/19   0458   RBC  3.89*  3.66*  3.71*  3.18*   HEMOGLOBIN  13.5*  12.5*  12.7*  10.9*   HEMATOCRIT  37.2*  35.6*  37.1*  31.5*   PLATELETCT  260  319  302  252   PROTHROMBTM  13.8   --    --    --    APTT  35.2   --    --    --    INR  1.05   --    --    --        Imaging  X-Ray:  I have personally reviewed the images and compared with prior images.  EKG:  I have personally reviewed the images and compared with prior images.  CT:    Reviewed   MRI reviewed    Assessment/Plan  Stroke (HCC)- (present on admission)   Assessment & Plan    Last time normal: 1800 4/18  TPA given at: 2012  Goal if TPA SBP <180/105  Aspirin per protocol  HD  intensity Statin f/u on lipid panel  DVT SCD's/prophylaxis per protocol  Nutrition at goal 48hrs  Blood glucose goal 150-180's check HgA1c  Rx fever  Neuro checks per protocol  Echo w/ bubble study  MRI no significant changes for infarct  ? Stroke per neuro  Asa and statin       Pelvic fracture (HCC)   Assessment & Plan    Per patient wife had pelvic fracture 3/25 nurse described swelling and bruising of right testicle       Hypotension   Assessment & Plan    Levophed for map > 65  Wean sedation     Acute respiratory failure with hypoxia (HCC)   Assessment & Plan    Intubated 4/18  Extubated 4/20  Rt/o2 protocol  Weaned sedation  Tolerated  seconary to altered mental status from encephalopathy               Essential hypertension   Assessment & Plan    BP control < 180   Use nicardipine if necessary.      CAD (coronary artery disease)- (present on admission)   Assessment & Plan    Aspirin per protocol statin     Hyponatremia- (present on admission)   Assessment & Plan    Target normal          VTE:  Contraindicated  Ulcer: H2 Antagonist  Lines: Central Line  Ongoing indication addressed, Arterial Line  Ongoing indication addressed and Ryan Catheter  Ongoing indication addressed    I have performed a physical exam and reviewed and updated ROS and Plan today (4/20/2019). In review of yesterday's note (4/19/2019), there are no changes except as documented above.     Discussed patient condition and risk of morbidity and/or mortality with Family, RN, RT, Pharmacy, , Code status disscussed, Charge nurse / hot rounds and neurology     The patient remains critically ill.  On ventilator, mult sbt today, weaned sedation, extubated and tolerated.  Critical care time = 31 minutes in directly providing and coordinating critical care and extensive data review.  No time overlap and excludes procedures.

## 2019-04-20 NOTE — DISCHARGE PLANNING
Spoke with Cami at [facility] who stated that patient will need to be transferred to [facility] or [facility] when stable for insurance.   Informed Vinita OMALLEY on the unit of this need.   Pt is intubated and sedated and at this time not stable to transfer.

## 2019-04-20 NOTE — PROGRESS NOTES
Dr. Johnson contacted regarding patient's agitation, -150s. Fentanyl PRN push not successful. Awaiting response.

## 2019-04-20 NOTE — PROGRESS NOTES
· 2 RN skin check complete with CATHLEEN Marion.  · Devices in place Holister, ET Tube, blood pressure cuff, ekg leads, lemons catheter, scd's, restraints.  · Skin assessed under devices Yes.  · Confirmed pressure ulcers found on suspected bilateral heels, left ankle, coccyx.  · New potential pressure ulcers noted on N/A (admission). Wound consult placed and wound reported.  · The following interventions in place *Ja cream, pillows used to float heels, and devices kept away from areas of concern

## 2019-04-20 NOTE — PROGRESS NOTES
"NEUROLOGY CONSULT PROGRESS NOTE:    Requesting Physician: Jayden Johnson  Admission Date: 4/18/2019    Reason for Consult: L hemiparesis, confusion.    Subjective:  Pt remains intubated and sedated due pulling lines. RN reports he was following commands and moving all limbs equally when more awake.     Physical Exam:  /91   Pulse 90   Temp 36.9 °C (98.5 °F) (Temporal)   Resp 18   Ht 1.753 m (5' 9\")   Wt 85 kg (187 lb 6.3 oz)   SpO2 100%   BMI 27.67 kg/m²     GEN: intubated and sedated.  EYES: Pupils 2.5mm->2mm bilaterally to light direct & consensual. No RADP.   HEENT: non-icteric and non-injected eyes. moist conjunctivae; no lid-lag.  normocephalic.  Hearings cannot be checked. The nares are patent.  Oropharynx unable to check due to intubation.   NECK: Trachea midline, supple, no notable bruit, No lymphadenopathy  EXT: no edema, Radial and dorsal petal pulses 2+ b/l  Psychiatric: induced full sedation.      Labs: personally reviewed    Radiology/Reports: independently reviewed by me (see below).    ASSESSMENT:  MRI brain did not show any clear evidence of acute stroke to explain his transient L hemiparesis. The tiny DWI hyperintensity in the L occipital lobe would not explain any of his symptoms and it may just be artifact. Hyponatremia to 126 would also cause lethargy mimicking stroke.  Worsening leukocytosis with left shift with his mental status change all strongly suggest an infectious process. EEG unremarkable for an elderly.      RECOMMENDATIONS:  - Normalize sodium and infectious w/u per primary service.   - Continue home ASA and statin.   - No further emergent neurological intervention needed. Call us back if he has persistent L hemiparesis even after extubation, full alertness and resolved leukocytosis and sodium.     Neurology signing off. Thank you for the consultation. Please feel free to call back w/ any further questions.    The above was discussed in details with the primary team " "including Dr. Jayden Johnson    I have spent 25 minutes total more than half of which was spent reviewing the images and lab results with the wife present, and answering all questions and explaining in details the assessment and recommendation sections above as well as coordinating the care with the primary team. The wife expressed her understanding and agreement to the above.    Carol Salgado MD  Acute Neurology Consultant  Tiger Text ID \"Clayton Salgado\"\  "

## 2019-04-20 NOTE — CARE PLAN
Problem: Respiratory:  Goal: Respiratory status will improve  Outcome: PROGRESSING AS EXPECTED  Pt Tolerating ventilator. Oxygen saturations remain >95% on FiO2=30%    Intervention: Administer and titrate oxygen therapy  Done   Intervention: Educate and encourage incentive spirometry usage  N/A at this time, will educate after extubation     Intervention: Collaborate with respiratory therapist and Interdisciplinary Team on treatment measures to improve respiratory function  Done.       Problem: Pain Management  Goal: Pain level will decrease to patient's comfort goal  Outcome: PROGRESSING AS EXPECTED  Pt remains on Fentanyl drip to control pain and enhance comfort level   Intervention: Follow pain managment plan developed in collaboration with patient and Interdisciplinary Team  CPOT scale utilized to assess pain while intubated, Fentanyl drip titrated as needed.   Intervention: Educate and implement non-pharmacologic comfort measures. Examples: relaxation, distration, play therapy, activity therapy, massage, etc.  Done.

## 2019-04-20 NOTE — THERAPY
pt now extubated; however, pt is agitated and was given medication for agitation per nsg; also noted pt to txf to [facility] to insurance once medically stable; will hold for now; may not be appropriate for eval at this facility as to not duplicate PT services if to transfer; if however, he remains in hospital will evaluate as medically appropriate

## 2019-04-20 NOTE — PROGRESS NOTES
Pt was able to nod yes to the question  if  he had nausea, denied abd pain,headache,backpain ,sob.zofran given and  When asked later he nodded no to the question

## 2019-04-20 NOTE — RESPIRATORY CARE
Extubation    Cuff leak noted YES   Stridor present NO     FiO2%: 30 % (04/20/19 0600)  O2 (LPM): 2 (04/18/19 2100)     Patient toleration WELL  Events/Summary/Plan: Verbal to extubate. Pt extubated to 3L 02 via NC. No stridor noted. Strong cough clears secretions.  (04/20/19 1121)

## 2019-04-20 NOTE — RESPIRATORY CARE
Ventilator Weaning Update    Patient is on vent day 2.  SBT was tolerated for a minimum of 1hr hours on settings of 5/+8.    Wean parameters for this SBT were:  #FVC / Vital Capacity (liters) : 1.25 (04/20/19 0712)  NIF (cm H2O) : -35 (04/20/19 0712)  Rapid Shallow Breathing Index (RR/VT): 25 (04/20/19 0712)  RR (bpm): 16 (04/20/19 0712)  Spontaneous VE: 9.4 (04/20/19 0712)  Spontaneous VT: 584 (04/20/19 0712)      Events/Summary/Plan: Spont ended after parameters obtained. Returned to [facility] at prior documented Rx settings.  (04/20/19 0712)

## 2019-04-20 NOTE — CARE PLAN
Problem: Bowel/Gastric:  Goal: Normal bowel function is maintained or improved  Pt reported nausea ,zofran was given. OJ pulled out by pt earlier today

## 2019-04-20 NOTE — PROGRESS NOTES
Pt restless agitated follows commands, BP and HR elevated. Pt continually attempting to climb out of bed. Pt started on Precedex, See MAR.

## 2019-04-20 NOTE — CARE PLAN
Problem: Ventilation Defect:  Goal: Ability to achieve and maintain unassisted ventilation or tolerate decreased levels of ventilator support  Outcome: PROGRESSING SLOWER THAN EXPECTED    Intervention: Support and monitor invasive and noninvasive mechanical ventilation  Adult Ventilation Update    Total Vent Days: 2    Patient Lines/Drains/Airways Status    Active Airway     Name: Placement date: Placement time: Site: Days:    Airway ETT Oral 8.0 04/19/19   0005   Oral   2              Plateau Pressure (Q Shift): 17 (04/19/19 0711)  Static Compliance (ml / cm H2O): 57.1 (04/19/19 5399)    Patient failed trials because of    Barriers to SBT Weaning Trial Stopped due to:: Pt weaned for 1 hour and returned to rest settings per protocol (04/19/19 1235)  Length of Weaning Trial Length of Weaning Trial (Hours): 1hr (04/19/19 1235)    Sputum/Suction   Cough:  (No suctioning needed) (04/20/19 0400)  Sputum Amount: Moderate;Small (04/20/19 0400)  Sputum Color: Pink Tinged;Bloody;Clear (04/20/19 0400)  Sputum Consistency: Thin;Thick (04/20/19 0400)    Mobility  Level of Mobility: Level II (04/19/19 2000)  Reason Not Mobilized: Bed rest (S/P TpA) (04/19/19 2000)    Events/Summary/Plan: ABG drawn (04/20/19 0422)

## 2019-04-20 NOTE — DISCHARGE PLANNING
:    Received a call from Transfer Center stating patient has [employer] insurance and will need to transfer to [facility] or [facility] once stable.  Notified patient is intubated and sedated and not stable to transfer.

## 2019-04-20 NOTE — PROGRESS NOTES
· 2 RN skin check complete with Cami MATHEW RN.  · Devices in place ETT, Pulse Ox, Telemetry leads, BP cuff, SCDs, Bilateral Wrist Restraints, Ryan Catheter.  · Skin assessed under devices Yes.  · Confirmed pressure ulcers found on suspected Coccyx. Bilateral heels, left ankle.  · New potential pressure ulcers noted on N/A.   · The following interventions in place Pillows to float heels, pillows for support, q2h turns, skin assessments with turns, Ja Cream on coccyx.

## 2019-04-20 NOTE — PROGRESS NOTES
Bedside report received from NOC RN. Plan of care discussed. Lines labs meds and orders reviewed. Neuro assessment completed with NOC RN. Pt restless follows commands. See nursing assessment. Pt's wife at bedside.

## 2019-04-21 ENCOUNTER — APPOINTMENT (OUTPATIENT)
Dept: RADIOLOGY | Facility: MEDICAL CENTER | Age: 84
DRG: 061 | End: 2019-04-21
Attending: INTERNAL MEDICINE
Payer: MEDICARE

## 2019-04-21 VITALS
BODY MASS INDEX: 28.38 KG/M2 | RESPIRATION RATE: 20 BRPM | OXYGEN SATURATION: 96 % | SYSTOLIC BLOOD PRESSURE: 154 MMHG | DIASTOLIC BLOOD PRESSURE: 91 MMHG | WEIGHT: 191.58 LBS | TEMPERATURE: 99.1 F | HEART RATE: 137 BPM | HEIGHT: 69 IN

## 2019-04-21 PROBLEM — E87.1 HYPONATREMIA: Status: RESOLVED | Noted: 2019-04-19 | Resolved: 2019-04-21

## 2019-04-21 PROBLEM — I95.9 HYPOTENSION: Status: RESOLVED | Noted: 2019-04-19 | Resolved: 2019-04-21

## 2019-04-21 PROBLEM — R41.0 DELIRIUM: Status: ACTIVE | Noted: 2019-04-21

## 2019-04-21 LAB
ANION GAP SERPL CALC-SCNC: 10 MMOL/L (ref 0–11.9)
BASOPHILS # BLD AUTO: 0.4 % (ref 0–1.8)
BASOPHILS # BLD: 0.04 K/UL (ref 0–0.12)
BUN SERPL-MCNC: 12 MG/DL (ref 8–22)
CALCIUM SERPL-MCNC: 9.1 MG/DL (ref 8.5–10.5)
CHLORIDE SERPL-SCNC: 104 MMOL/L (ref 96–112)
CO2 SERPL-SCNC: 23 MMOL/L (ref 20–33)
CREAT SERPL-MCNC: 0.84 MG/DL (ref 0.5–1.4)
CRP SERPL HS-MCNC: 10.75 MG/DL (ref 0–0.75)
EOSINOPHIL # BLD AUTO: 0.4 K/UL (ref 0–0.51)
EOSINOPHIL NFR BLD: 4.4 % (ref 0–6.9)
ERYTHROCYTE [DISTWIDTH] IN BLOOD BY AUTOMATED COUNT: 44.8 FL (ref 35.9–50)
GLUCOSE SERPL-MCNC: 119 MG/DL (ref 65–99)
HCT VFR BLD AUTO: 34.7 % (ref 42–52)
HGB BLD-MCNC: 11.7 G/DL (ref 14–18)
IMM GRANULOCYTES # BLD AUTO: 0.08 K/UL (ref 0–0.11)
IMM GRANULOCYTES NFR BLD AUTO: 0.9 % (ref 0–0.9)
LYMPHOCYTES # BLD AUTO: 1.32 K/UL (ref 1–4.8)
LYMPHOCYTES NFR BLD: 14.5 % (ref 22–41)
MAGNESIUM SERPL-MCNC: 2.1 MG/DL (ref 1.5–2.5)
MCH RBC QN AUTO: 33.9 PG (ref 27–33)
MCHC RBC AUTO-ENTMCNC: 33.7 G/DL (ref 33.7–35.3)
MCV RBC AUTO: 100.6 FL (ref 81.4–97.8)
MONOCYTES # BLD AUTO: 1.57 K/UL (ref 0–0.85)
MONOCYTES NFR BLD AUTO: 17.2 % (ref 0–13.4)
NEUTROPHILS # BLD AUTO: 5.71 K/UL (ref 1.82–7.42)
NEUTROPHILS NFR BLD: 62.6 % (ref 44–72)
NRBC # BLD AUTO: 0 K/UL
NRBC BLD-RTO: 0 /100 WBC
PHOSPHATE SERPL-MCNC: 4.2 MG/DL (ref 2.5–4.5)
PLATELET # BLD AUTO: 269 K/UL (ref 164–446)
PMV BLD AUTO: 10.2 FL (ref 9–12.9)
POTASSIUM SERPL-SCNC: 3.4 MMOL/L (ref 3.6–5.5)
PREALB SERPL-MCNC: 13 MG/DL (ref 18–38)
RBC # BLD AUTO: 3.45 M/UL (ref 4.7–6.1)
SODIUM SERPL-SCNC: 137 MMOL/L (ref 135–145)
WBC # BLD AUTO: 9.1 K/UL (ref 4.8–10.8)

## 2019-04-21 PROCEDURE — 700101 HCHG RX REV CODE 250: Performed by: HOSPITALIST

## 2019-04-21 PROCEDURE — 84100 ASSAY OF PHOSPHORUS: CPT

## 2019-04-21 PROCEDURE — 700101 HCHG RX REV CODE 250: Performed by: INTERNAL MEDICINE

## 2019-04-21 PROCEDURE — 80048 BASIC METABOLIC PNL TOTAL CA: CPT

## 2019-04-21 PROCEDURE — 700111 HCHG RX REV CODE 636 W/ 250 OVERRIDE (IP): Performed by: INTERNAL MEDICINE

## 2019-04-21 PROCEDURE — 83735 ASSAY OF MAGNESIUM: CPT

## 2019-04-21 PROCEDURE — 99239 HOSP IP/OBS DSCHRG MGMT >30: CPT | Performed by: HOSPITALIST

## 2019-04-21 PROCEDURE — 700105 HCHG RX REV CODE 258: Performed by: INTERNAL MEDICINE

## 2019-04-21 PROCEDURE — 99291 CRITICAL CARE FIRST HOUR: CPT | Performed by: INTERNAL MEDICINE

## 2019-04-21 PROCEDURE — A9270 NON-COVERED ITEM OR SERVICE: HCPCS | Performed by: INTERNAL MEDICINE

## 2019-04-21 PROCEDURE — 92610 EVALUATE SWALLOWING FUNCTION: CPT

## 2019-04-21 PROCEDURE — 84134 ASSAY OF PREALBUMIN: CPT

## 2019-04-21 PROCEDURE — 85025 COMPLETE CBC W/AUTO DIFF WBC: CPT

## 2019-04-21 PROCEDURE — 700102 HCHG RX REV CODE 250 W/ 637 OVERRIDE(OP): Performed by: INTERNAL MEDICINE

## 2019-04-21 PROCEDURE — 71045 X-RAY EXAM CHEST 1 VIEW: CPT

## 2019-04-21 PROCEDURE — 86140 C-REACTIVE PROTEIN: CPT

## 2019-04-21 RX ORDER — BISACODYL 10 MG
10 SUPPOSITORY, RECTAL RECTAL
Refills: 0
Start: 2019-04-21

## 2019-04-21 RX ORDER — ONDANSETRON 2 MG/ML
4 INJECTION INTRAMUSCULAR; INTRAVENOUS EVERY 4 HOURS PRN
Qty: 84 ML
Start: 2019-04-21

## 2019-04-21 RX ORDER — SODIUM CHLORIDE, SODIUM LACTATE, POTASSIUM CHLORIDE, CALCIUM CHLORIDE 600; 310; 30; 20 MG/100ML; MG/100ML; MG/100ML; MG/100ML
1000 INJECTION, SOLUTION INTRAVENOUS ONCE
Status: COMPLETED | OUTPATIENT
Start: 2019-04-21 | End: 2019-04-21

## 2019-04-21 RX ORDER — METOPROLOL TARTRATE 1 MG/ML
5 INJECTION, SOLUTION INTRAVENOUS ONCE
Status: COMPLETED | OUTPATIENT
Start: 2019-04-21 | End: 2019-04-21

## 2019-04-21 RX ORDER — ONDANSETRON 4 MG/1
4 TABLET, ORALLY DISINTEGRATING ORAL EVERY 4 HOURS PRN
Status: DISCONTINUED | OUTPATIENT
Start: 2019-04-21 | End: 2019-04-21 | Stop reason: HOSPADM

## 2019-04-21 RX ORDER — ONDANSETRON 4 MG/1
4 TABLET, ORALLY DISINTEGRATING ORAL EVERY 4 HOURS PRN
Qty: 10 TAB | Refills: 0
Start: 2019-04-21

## 2019-04-21 RX ORDER — AMOXICILLIN 250 MG
2 CAPSULE ORAL 2 TIMES DAILY
Qty: 30 TAB | Refills: 0
Start: 2019-04-21

## 2019-04-21 RX ORDER — METOPROLOL SUCCINATE 25 MG/1
25 TABLET, EXTENDED RELEASE ORAL 2 TIMES DAILY
Start: 2019-04-21

## 2019-04-21 RX ORDER — POLYETHYLENE GLYCOL 3350 17 G/17G
17 POWDER, FOR SOLUTION ORAL
Refills: 3
Start: 2019-04-21

## 2019-04-21 RX ORDER — QUETIAPINE FUMARATE 25 MG/1
25 TABLET, FILM COATED ORAL 3 TIMES DAILY
Qty: 6 TAB | Refills: 3
Start: 2019-04-21 | End: 2019-04-23

## 2019-04-21 RX ORDER — MAGNESIUM SULFATE HEPTAHYDRATE 40 MG/ML
2 INJECTION, SOLUTION INTRAVENOUS ONCE
Status: DISCONTINUED | OUTPATIENT
Start: 2019-04-21 | End: 2019-04-21

## 2019-04-21 RX ORDER — ACETAMINOPHEN 325 MG/1
650 TABLET ORAL EVERY 6 HOURS PRN
Qty: 30 TAB | Refills: 0
Start: 2019-04-21

## 2019-04-21 RX ORDER — HYDRALAZINE HYDROCHLORIDE 20 MG/ML
10 INJECTION INTRAMUSCULAR; INTRAVENOUS EVERY 4 HOURS PRN
Refills: 0
Start: 2019-04-21

## 2019-04-21 RX ORDER — ATORVASTATIN CALCIUM 40 MG/1
40 TABLET, FILM COATED ORAL EVERY EVENING
Qty: 30 TAB
Start: 2019-04-21

## 2019-04-21 RX ORDER — QUETIAPINE FUMARATE 25 MG/1
25 TABLET, FILM COATED ORAL 3 TIMES DAILY
Status: DISCONTINUED | OUTPATIENT
Start: 2019-04-21 | End: 2019-04-21 | Stop reason: HOSPADM

## 2019-04-21 RX ADMIN — DEXMEDETOMIDINE HYDROCHLORIDE 0.5 MCG/KG/HR: 100 INJECTION, SOLUTION INTRAVENOUS at 01:10

## 2019-04-21 RX ADMIN — HYDRALAZINE HYDROCHLORIDE 10 MG: 20 INJECTION INTRAMUSCULAR; INTRAVENOUS at 14:25

## 2019-04-21 RX ADMIN — QUETIAPINE FUMARATE 25 MG: 25 TABLET ORAL at 09:24

## 2019-04-21 RX ADMIN — POTASSIUM BICARBONATE 50 MEQ: 978 TABLET, EFFERVESCENT ORAL at 10:12

## 2019-04-21 RX ADMIN — HYDRALAZINE HYDROCHLORIDE 10 MG: 20 INJECTION INTRAMUSCULAR; INTRAVENOUS at 13:14

## 2019-04-21 RX ADMIN — ENOXAPARIN SODIUM 40 MG: 100 INJECTION SUBCUTANEOUS at 09:24

## 2019-04-21 RX ADMIN — SENNOSIDES,DOCUSATE SODIUM 2 TABLET: 8.6; 5 TABLET, FILM COATED ORAL at 06:30

## 2019-04-21 RX ADMIN — ASPIRIN 325 MG: 325 TABLET ORAL at 06:30

## 2019-04-21 RX ADMIN — METOPROLOL TARTRATE 5 MG: 1 INJECTION, SOLUTION INTRAVENOUS at 15:04

## 2019-04-21 RX ADMIN — SODIUM CHLORIDE, POTASSIUM CHLORIDE, SODIUM LACTATE AND CALCIUM CHLORIDE 1000 ML: 600; 310; 30; 20 INJECTION, SOLUTION INTRAVENOUS at 08:02

## 2019-04-21 ASSESSMENT — LIFESTYLE VARIABLES
ON A TYPICAL DAY WHEN YOU DRINK ALCOHOL HOW MANY DRINKS DO YOU HAVE: 1
EVER HAD A DRINK FIRST THING IN THE MORNING TO STEADY YOUR NERVES TO GET RID OF A HANGOVER: NO
HAVE PEOPLE ANNOYED YOU BY CRITICIZING YOUR DRINKING: NO
EVER_SMOKED: YES
CONSUMPTION TOTAL: NEGATIVE
TOTAL SCORE: 0
TOTAL SCORE: 0
AVERAGE NUMBER OF DAYS PER WEEK YOU HAVE A DRINK CONTAINING ALCOHOL: 5
ALCOHOL_USE: YES
HOW MANY TIMES IN THE PAST YEAR HAVE YOU HAD 5 OR MORE DRINKS IN A DAY: 0
TOTAL SCORE: 0
EVER FELT BAD OR GUILTY ABOUT YOUR DRINKING: NO
HAVE YOU EVER FELT YOU SHOULD CUT DOWN ON YOUR DRINKING: NO
EVER_SMOKED: YES

## 2019-04-21 ASSESSMENT — COGNITIVE AND FUNCTIONAL STATUS - GENERAL
STANDING UP FROM CHAIR USING ARMS: TOTAL
MOVING TO AND FROM BED TO CHAIR: A LOT
MOVING FROM LYING ON BACK TO SITTING ON SIDE OF FLAT BED: UNABLE
SUGGESTED CMS G CODE MODIFIER MOBILITY: CM
PERSONAL GROOMING: A LOT
CLIMB 3 TO 5 STEPS WITH RAILING: TOTAL
HELP NEEDED FOR BATHING: A LOT
WALKING IN HOSPITAL ROOM: TOTAL
DRESSING REGULAR UPPER BODY CLOTHING: TOTAL
TOILETING: TOTAL
DAILY ACTIVITIY SCORE: 9
SUGGESTED CMS G CODE MODIFIER DAILY ACTIVITY: CL
EATING MEALS: TOTAL
TURNING FROM BACK TO SIDE WHILE IN FLAT BAD: A LOT
DRESSING REGULAR LOWER BODY CLOTHING: A LOT
MOBILITY SCORE: 8

## 2019-04-21 ASSESSMENT — COPD QUESTIONNAIRES
DO YOU EVER COUGH UP ANY MUCUS OR PHLEGM?: NO/ONLY WITH OCCASIONAL COLDS OR INFECTIONS
HAVE YOU SMOKED AT LEAST 100 CIGARETTES IN YOUR ENTIRE LIFE: YES
COPD SCREENING SCORE: 4
DURING THE PAST 4 WEEKS HOW MUCH DID YOU FEEL SHORT OF BREATH: NONE/LITTLE OF THE TIME

## 2019-04-21 ASSESSMENT — PATIENT HEALTH QUESTIONNAIRE - PHQ9
SUM OF ALL RESPONSES TO PHQ9 QUESTIONS 1 AND 2: 0
2. FEELING DOWN, DEPRESSED, IRRITABLE, OR HOPELESS: NOT AT ALL
1. LITTLE INTEREST OR PLEASURE IN DOING THINGS: NOT AT ALL

## 2019-04-21 NOTE — PROGRESS NOTES
· 2 RN skin check completed with Genna CONNOLLY.  · Devices in place ETT, [place] appliance,  EKG leads, PIV x3, NIBP Cuff, Pulse oximetry, Bilateral wrist restraints, SCD, Ryan, Stat lock,   · Skin assessed under devices  yes.  · Confirmed pressure ulcers found on na.  · New potential pressure ulcers noted on R & L heels red/blanching, sacrum coccyx red blanching,/ Left Ball of foot red/scabbed/ L ankle red, L hand skin tear, Left forearm skin tear. R & l forearms swelling brusing noted.  ·  Wound consult in place.  · The following interventions in place All devices padded, elbow and heels floated on pillows. Pt turned Q 2 hrs, repositioned with pillows, Mepilex to R & L heels, mepilex to sacrum, left forearm and hand biatin foam in place. Ett repositioned Q 2 hrs. NIBP cuff and oximetry rotated sites Q 2 hrs.

## 2019-04-21 NOTE — DISCHARGE SUMMARY
Discharge Summary    CHIEF COMPLAINT ON ADMISSION  Chief Complaint   Patient presents with   • Possible Stroke     Last known well at ~18:00. Left sided weakness and hemianopsia per EMS       Reason for Admission  Stroke     CODE STATUS  Full Code    HPI & HOSPITAL COURSE  This is an 83 y.o. male who presented 4/18/2019 with a recent right hip fracture 3/25, CABG x4, Stent x3, hyperlipidemia, cholecystectomy, htn.  He at about 4pm became shaky and his wife called EMS; he was found to have left sided weakness and was given TPA for NIH 11 (left sided weakness and sensory loss, left visual field cut as well as facial droop).  This improved to an NIH of 2 after TPA.  However, he then became acutely agitated and there was concern for possible ICH post TPA.  For this reason, he was intubated and taken for CT head, which was negative for acute pathology. He remained on the ventilator so was started on propofol and fentanyl.  He then became hypotensive even after stopping propofol and dropping fentanyl for 15 minutes so levophed was started.      He was able to wean off both levophed and ventilator in fairly good time, and was extubated 4/20.  He remained confused adn slightly agitated, and was started on precedex drip, which has now been weaned off.  He is currently on seroquel tid for a few doses.  Otherwise he remains stable hemodynamically, and is on 0.5L NC.  He failed a speech evaluation and is at goal on his tube feeds.  He has PT/OT/speech ordered and will need to be continued.      MRI did not show stroke post TPA; per neurology he is to continue his home asa and statin.  I did change him from zocor to lipitor.    Due to insurance, he is being transferred to [facility].     Therefore, he is discharged in fair and stable condition to a short-term general hosptial for inpatient care.    The patient met 2-midnight criteria for an inpatient stay at the time of discharge.      FOLLOW UP ITEMS POST DISCHARGE  Continued care by  Dr. Caicedo at [facility]    DISCHARGE DIAGNOSES  Active Problems:    Stroke (HCC) POA: Yes    CAD (coronary artery disease) POA: Yes    Essential hypertension POA: Yes    Acute respiratory failure with hypoxia (HCC) POA: Yes    Pelvic fracture (HCC) POA: Yes    Delirium POA: No  Resolved Problems:    Hyponatremia POA: Yes    Hypotension POA: No      FOLLOW UP  PT/OT/Speech  He will need ongoing PT for his right hip fracture    MEDICATIONS ON DISCHARGE     Medication List      START taking these medications      Instructions   acetaminophen 325 MG Tabs  Commonly known as:  TYLENOL   Take 2 Tabs by mouth every 6 hours as needed (Mild Pain; (Pain scale 1-3); Temp greater than 100.5 F).  Dose:  650 mg     atorvastatin 40 MG Tabs  Commonly known as:  LIPITOR   Take 1 Tab by mouth every evening.  Dose:  40 mg     bisacodyl 10 MG Supp  Commonly known as:  DULCOLAX   Insert 1 Suppository in rectum 1 time daily as needed (if magnesium hydroxide ineffective after 24 hours).  Dose:  10 mg     enoxaparin 40 MG/0.4ML Soln inj  Start taking on:  4/22/2019  Commonly known as:  LOVENOX   Inject 40 mg as instructed every day.  Dose:  40 mg     hydrALAZINE 20 MG/ML Soln  Commonly known as:  APRESOLINE   0.5 mL by Intravenous route every four hours as needed.  Dose:  10 mg     magnesium hydroxide 400 MG/5ML Susp  Commonly known as:  MILK OF MAGNESIA   Take 30 mL by mouth 1 time daily as needed (if polyethylene glycol ineffective after 24 hours).  Dose:  30 mL     ondansetron 4 MG Tbdp  Commonly known as:  ZOFRAN ODT   Take 1 Tab by mouth every four hours as needed for Nausea (give PO if IV route is unavailable.).  Dose:  4 mg     ondansetron 4 MG/2ML Soln injection  Commonly known as:  ZOFRAN   2 mL by Intravenous route every four hours as needed for Nausea.  Dose:  4 mg     polyethylene glycol/lytes Pack  Commonly known as:  MIRALAX   Take 1 Packet by mouth 1 time daily as needed (if sennosides and docusate ineffective after 24  hours).  Dose:  17 g     QUEtiapine 25 MG Tabs  Commonly known as:  SEROQUEL   Take 1 Tab by mouth 3 times a day for 6 doses.  Dose:  25 mg     senna-docusate 8.6-50 MG Tabs  Commonly known as:  PERICOLACE or SENOKOT S   Take 2 Tabs by mouth 2 Times a Day.  Dose:  2 Tab        CHANGE how you take these medications      Instructions   metoprolol SR 25 MG Tb24  What changed:  additional instructions  Commonly known as:  TOPROL XL   Take 1 Tab by mouth 2 Times a Day. Will need to change to short acting if he is unable to swallow  Dose:  25 mg        CONTINUE taking these medications      Instructions   aspirin 81 MG Chew chewable tablet  Commonly known as:  ASA   Take 81 mg by mouth every day.  Dose:  81 mg     finasteride 5 MG Tabs  Commonly known as:  PROSCAR   Take 5 mg by mouth every day.  Dose:  5 mg     furosemide 20 MG Tabs  Commonly known as:  LASIX   Take 20 mg by mouth every day.  Dose:  20 mg     lisinopril 30 MG tablet  Commonly known as:  PRINIVIL, ZESTRIL   Take 30 mg by mouth every day.  Dose:  30 mg     potassium chloride 10 MEQ capsule  Commonly known as:  MICRO-K   Take 10 mEq by mouth every day.  Dose:  10 mEq     tramadol 50 MG Tabs  Commonly known as:  ULTRAM   Take 50 mg by mouth every 6 hours as needed.  Dose:  50 mg        STOP taking these medications    simvastatin 20 MG Tabs  Commonly known as:  ZOCOR            Allergies  No Known Allergies    DIET  Orders Placed This Encounter   Procedures   • Diet NPO     Standing Status:   Standing     Number of Occurrences:   1     Order Specific Question:   Type:     Answer:   Now [1]     Order Specific Question:   Restrict to:     Answer:   Strict [1]     Tube feeds   Replete Fiber at 80cc/hr    ACTIVITY  As tolerated.  Weight bearing as tolerated    LINES, DRAINS, AND WOUNDS  This is an automated list. Peripheral IVs will be removed prior to discharge.  Peripheral IV 04/21/19 20 G Left Upper arm (Active)   Site Assessment Clean;Dry;Intact 4/21/2019  10:00 AM   Dressing Type Transparent;Gauze 4/21/2019 10:00 AM   Line Status Blood return noted;Flushed;Infusing;Scrubbed the hub prior to access 4/21/2019 10:00 AM   Dressing Status Clean;Dry;Intact 4/21/2019 10:00 AM   Dressing Intervention Initial dressing 4/21/2019 10:00 AM   Date Primary Tubing Changed 04/20/19 4/21/2019 10:00 AM   NEXT Primary Tubing Change  04/23/19 4/21/2019 10:00 AM   Infiltration Grading ([facility], CV) 0 4/21/2019 10:00 AM   Phlebitis Scale ([facility] Only) 0 4/21/2019 10:00 AM     Urethral Catheter Temperature probe 16 Fr. (Active)   Site Assessment Clean;Skin intact 4/21/2019  8:00 AM   Collection Container Standard drainage bag 4/21/2019  8:00 AM   Urinary Catheter Care Cath Care Done with Soap & Water 4/21/2019 10:00 AM   Securement Method Securing device (Describe) 4/21/2019  8:00 AM   Output (mL) 85 mL 4/21/2019 12:00 PM         Peripheral IV 04/21/19 20 G Left Upper arm (Active)   Site Assessment Clean;Dry;Intact 4/21/2019 10:00 AM   Dressing Type Transparent;Gauze 4/21/2019 10:00 AM   Line Status Blood return noted;Flushed;Infusing;Scrubbed the hub prior to access 4/21/2019 10:00 AM   Dressing Status Clean;Dry;Intact 4/21/2019 10:00 AM   Dressing Intervention Initial dressing 4/21/2019 10:00 AM   Date Primary Tubing Changed 04/20/19 4/21/2019 10:00 AM   NEXT Primary Tubing Change  04/23/19 4/21/2019 10:00 AM   Infiltration Grading ([facility], CV) 0 4/21/2019 10:00 AM   Phlebitis Scale ([facility] Only) 0 4/21/2019 10:00 AM           Urethral Catheter Temperature probe 16 Fr. (Active)   Site Assessment Clean;Skin intact 4/21/2019  8:00 AM   Collection Container Standard drainage bag 4/21/2019  8:00 AM   Urinary Catheter Care Cath Care Done with Soap & Water 4/21/2019 10:00 AM   Securement Method Securing device (Describe) 4/21/2019  8:00 AM   Output (mL) 85 mL 4/21/2019 12:00 PM        MENTAL STATUS ON TRANSFER  Level of Consciousness: Alert  Orientation : Disoriented to Time  Speech: Speech  Clear (mummbled)    CONSULTATIONS  Intensivist Dr. Johnson  Neurology Dr. Salgado    PROCEDURES  EEG 4/19:  This is an abnormal routine EEG recording in the awake, drowsy, and sleep state(s). A mild encephalopathy is suggested. Frequent runs of FIRDA, suggesting either an underlying area of cortical irritability, a structural abnormality and/or increased intracranial pressure. The findings increase risk for seizures. Clinical and radiological correlation is recommended.     Intubation 4/18    PHYSICAL EXAM:  Vitals:    04/21/19 1100 04/21/19 1200 04/21/19 1300 04/21/19 1400   BP:       Pulse: 85 (!) 112 (!) 113 (!) 123   Resp: 13 20 19 17   Temp:  36.5 °C (97.7 °F)  36.8 °C (98.2 °F)   TempSrc:  Temporal  Temporal   SpO2: 100% 99% 99% 99%   Weight:       Height:         Constitutional: He appears well-developed and well-nourished. No distress.   HENT:   Head: Normocephalic and atraumatic.   Mouth/Throat: Oropharynx is clear and moist.   Eyes: Pupils are equal, round, and reactive to light. Conjunctivae are normal. No scleral icterus.   Neck: Normal range of motion. Neck supple.   Cardiovascular: 110s heart rate, regular rhythm and normal heart sounds.    Pulmonary/Chest: Effort normal and breath sounds normal. No respiratory distress. He has no wheezes. He has no rales.   Abdominal: Soft. Bowel sounds are normal. He exhibits no distension. There is no tenderness. There is no rebound.   Musculoskeletal: Normal range of motion. He exhibits no edema or tenderness.   Lymphadenopathy:     He has no cervical adenopathy.   Neurological: He is alert. Coordination normal.   Seems more oriented now  Strength relatively intact  Skin: Skin is warm. No rash noted.   Psychiatric: His mood appears anxious but not agitated.       LABORATORY  Lab Results   Component Value Date    SODIUM 137 04/21/2019    POTASSIUM 3.4 (L) 04/21/2019    CHLORIDE 104 04/21/2019    CO2 23 04/21/2019    GLUCOSE 119 (H) 04/21/2019    BUN 12 04/21/2019     CREATININE 0.84 04/21/2019    K was replaced    Lab Results   Component Value Date    WBC 9.1 04/21/2019    HEMOGLOBIN 11.7 (L) 04/21/2019    HEMATOCRIT 34.7 (L) 04/21/2019    PLATELETCT 269 04/21/2019      Recent Labs      04/18/19 1927 04/18/19 2025   ASTSGOT   --   23   ALTSGPT   --   8   TBILIRUBIN   --   0.6   ALKPHOSPHAT   --   130*   GLOBULIN   --   3.3   INR  1.05   --        Lab Results   Component Value Date/Time    CHOLSTRLTOT 98 (L) 04/19/2019 02:49 AM    LDL 48 04/19/2019 02:49 AM    HDL 35 (A) 04/19/2019 02:49 AM    TRIGLYCERIDE 75 04/19/2019 02:49 AM         Total time of the discharge process exceeds 45 minutes.

## 2019-04-21 NOTE — CARE PLAN
Problem: Safety  Goal: Will remain free from injury  Outcome: PROGRESSING AS EXPECTED  Bed is locked and in lowest position with treaded socks on and call light within reach. Patient educated regarding safety. Verbalizes understanding. Restraints in place with active order. No signs of injury related to restraints.  Discontinuation criteria explained to pt. Reinforcement needed    Problem: Knowledge Deficit  Goal: Knowledge of disease process/condition, treatment plan, diagnostic tests, and medications will improve  Outcome: PROGRESSING AS EXPECTED  Pt and spouse at bedside educated on POC, current medications and doses and disease process. All questions answered.    Problem: Safety:  Goal: Will remain free from injury  Outcome: PROGRESSING AS EXPECTED  Bed is locked and in lowest position with treaded socks on and call light within reach. Patient educated regarding safety. Verbalizes understanding. Restraints in place with active order. No signs of injury related to restraints.  Discontinuation criteria explained to pt. Reinforcement needed    Problem: Knowledge Deficit:  Goal: Knowledge of disease process/condition, treatment plan, diagnostic tests, and medications will improve  Outcome: PROGRESSING AS EXPECTED  Pt and spouse at bedside educated on POC, current medications and doses and disease process. All questions answered.

## 2019-04-21 NOTE — DIETARY
"Nutrition Support/TF Assessment:  Day 3 of admit.  Tavo Ramos is a 83 y.o. male with admitting DX of Stroke, received TPA less than 24 hours prior to arrival.      Current problem list:  1. Stroke  2. Pelvic Fracture  3. Hypotension  4. Acute Respiratory failure (now extubated)  5. Essential HTN  6. CAD  7. Hyponatremia     Assessment:  Estimated Nutritional Needs based on:   Height: 175.3 cm (5' 9.02\") (copied from last entry)  Weight: 85 kg (187 lb 6.3 oz)- first measured wt via bed scale on . Pt's stated wt is 87 kg/ 192#.   Weight to Use in Calculations: 85 kg (187 lb 6.3 oz)  Ideal Body Weight: 72.6 kg (160 lb)  Percent Ideal Body Weight: 117.1  Body mass index is 27.66 kg/m².     Calculation/Equation: MSJ X 1.2= 1857  Total Calories / day: 8951-3144 (Calories / kg: ~22-24)  Total Grams Protein / day:  (Grams Protein / k.1-1.4)  Total Free water/day: 4054-0315 ml/day (25-30 ml/kg)     Evaluation:   1. TF consult received, Per RN notes: pt extubated but agitated.   2. Cortrak confirmed in stomach on .   3. Wound care consult pending. Wounds noted to Left heel/ankle/foot, Right heel, and coccyx.   4. Labs reviewed  5. Pertinent Meds: Precedex, Electrolyte replacement protocol, Zofran PRN, Bowel protocol.     Malnutrition Risk: unable to assess due to limited information.      Recommendations/Plan:  1. Begin Replete with Fiber  @ 25 ml/hr and advance per TF protocol to goal rate of 80 ml/hr to provide 1920 kcals/day, 122 g pro/day, and 1567 free water/day.   2. Fluids per MD.   3. RD following              "

## 2019-04-21 NOTE — PROGRESS NOTES
Cortrak Placement    Tube Team verified patient name and medical record number prior to tube placement.  Cortrak tube (43 inches, 12 Pashto) placed at 54 cm in right nare.  Pending placement via X-ray.

## 2019-04-21 NOTE — DISCHARGE PLANNING
Patient is now stable for transfer to either [facility] or [facility]. [facility] cannot accommodate patient at this time, I can call back later this afternoon.    [facility] will consider the transfer, faxed relevant notes to [facility] house supervisor.

## 2019-04-21 NOTE — PROGRESS NOTES
Critical Care Progress Note    Date of admission  4/18/2019    Chief Complaint  83 y.o. male who presented 4/18/2019 with right hip fracture 3/25, CABG x4, Stent x3, chol, cholecystectomy, htn, that about 4pm became real shaky the wife called EMS was found to have left sided weakness given TPA had a NIH 11 (left sided weakness and sensory loss, left visual field cut as well as facial droop) that improved to NIH 2 after TPA and then became acutely agitated and concerns for possible ICH post TPA so patient was intubated and good thing repeat CT Head was negative for acute pathology. Patient was still agitated on ventilator was started on propofol and fentanyl he became hypotensive even after stopping propofol and dropping fentanyl for 15 minutes levophed was started.      US bedside IVC 3.48cm with no respiratory change Good BiV function patient    Hospital Course          Interval Problem Update  Reviewed last 24 hour events:  Extubated 4/20  Required precedex yesterday and remains on 0.2/hr  Start seroquel today 25 mg tid 2 days  Post tpa for stroke  sbp lower side  Speech pending  Tube feeds  Low urinary output overnight  2 peripheral IV  Not mobilized  1/2 L nc  No stress ulcer px  No dvt px  Supplement px      Review of Systems  Review of Systems   Unable to perform ROS: Mental status change        Vital Signs for last 24 hours   Temp:  [36.9 °C (98.4 °F)-37.3 °C (99.1 °F)] 37.3 °C (99.1 °F)  Pulse:  [] 74  Resp:  [12-31] 13  SpO2:  [97 %-100 %] 98 %    Hemodynamic parameters for last 24 hours       Respiratory Information for the last 24 hours  [place] Vent Mode: Spont  Rate (breaths/min): 18  Vt Target (mL): 430  PEEP/CPAP: 8  FiO2: 30  P Support: 5  P MEAN: 11  Length of Weaning Trial (Hours): 1hr  Control VTE (exp VT): 431    Physical Exam   Physical Exam   Constitutional: He appears distressed.   HENT:   Head: Normocephalic and atraumatic.   Right Ear: External ear normal.   Left Ear: External ear  normal.   Mouth/Throat: No oropharyngeal exudate.   Eyes: Pupils are equal, round, and reactive to light. Conjunctivae and EOM are normal. Right eye exhibits no discharge. Left eye exhibits no discharge.   Neck: No JVD present. No tracheal deviation present.   Cardiovascular: Normal rate, regular rhythm and normal heart sounds.    No murmur heard.  Pulmonary/Chest: Breath sounds normal. No stridor. He is in respiratory distress. He has no wheezes. He has no rales. He exhibits no tenderness.   Abdominal: He exhibits no mass. There is no tenderness. There is no rebound and no guarding.   Musculoskeletal: He exhibits edema. He exhibits no tenderness or deformity.   Neurological: He is alert. He displays normal reflexes. No cranial nerve deficit. Coordination normal.   Skin: Skin is warm. No rash noted. He is diaphoretic. No erythema.   Psychiatric: He has a normal mood and affect. His behavior is normal.   Nursing note and vitals reviewed.      Medications  Current Facility-Administered Medications   Medication Dose Route Frequency Provider Last Rate Last Dose   • hydrALAZINE (APRESOLINE) injection 10 mg  10 mg Intravenous Q4HRS PRN Jayden K Elizabeth   10 mg at 04/20/19 1212   • lisinopril (PRINIVIL) 10 MG tablet 10 mg  10 mg Enteral Tube Q DAY Jayden K Elizabeth   Stopped at 04/20/19 1215   • dexmedetomidine (PRECEDEX) 400 mcg/100mL NS premix infusion  0.1-1.5 mcg/kg/hr Intravenous Continuous Jayden K Elizabeth 6.4 mL/hr at 04/21/19 0625 0.3 mcg/kg/hr at 04/21/19 0625   • metoprolol (LOPRESSOR) tablet 25 mg  25 mg Oral TWICE DAILY Jayden K Elizabeth       • fentaNYL (SUBLIMAZE) injection 25 mcg  25 mcg Intravenous Q HOUR PRN Jayden K Elizabeth       • MD Alert...ICU Electrolyte Replacement per Pharmacy   Other PHARMACY TO DOSE Jayden K Elizabeth       • Pharmacy Consult: Enteral tube insertion - review meds/change route/product selection   Other PHARMACY TO DOSE Jayden K Elizabeth       • aspirin (ASA) chewable tab 324 mg  324 mg Enteral Tube DAILY  Jayden K Elizabeth        Or   • aspirin (ASA) tablet 325 mg  325 mg Enteral Tube DAILY Jayden K Elizabeth   325 mg at 04/21/19 0630    Or   • aspirin (ASA) suppository 300 mg  300 mg Rectal DAILY Jayden K Elizabeth   300 mg at 04/20/19 0018   • atorvastatin (LIPITOR) tablet 40 mg  40 mg Enteral Tube Q EVENING Jayden K Elizabeth   40 mg at 04/20/19 1915   • senna-docusate (PERICOLACE or SENOKOT S) 8.6-50 MG per tablet 2 Tab  2 Tab Enteral Tube BID Jayden K Elizabeth   2 Tab at 04/21/19 0630    And   • polyethylene glycol/lytes (MIRALAX) PACKET 1 Packet  1 Packet Enteral Tube QDAY PRN Jayden K Elizabeth        And   • magnesium hydroxide (MILK OF MAGNESIA) suspension 30 mL  30 mL Enteral Tube QDAY PRN Jayden K Elizabeth        And   • bisacodyl (DULCOLAX) suppository 10 mg  10 mg Rectal QDAY PRN Jayden K Elizabeth       • acetaminophen (TYLENOL) tablet 650 mg  650 mg Enteral Tube Q6HRS PRN Jayden K Elizabeth       • Respiratory Care per Protocol   Nebulization Continuous RT Shaka Akhtar M.D.       • ondansetron (ZOFRAN) syringe/vial injection 4 mg  4 mg Intravenous Q4HRS PRN Shaka Akhtar M.D.   4 mg at 04/19/19 1655   • ondansetron (ZOFRAN ODT) dispertab 4 mg  4 mg Oral Q4HRS PRN Shaka Akhtar M.D.           Fluids    Intake/Output Summary (Last 24 hours) at 04/21/19 0645  Last data filed at 04/21/19 0200   Gross per 24 hour   Intake           976.04 ml   Output             2450 ml   Net         -1473.96 ml       Laboratory  Recent Labs      04/19/19   0109  04/19/19   0512  04/20/19   0339   ISTATAPH  7.270*  7.384*  7.412   ISTATAPCO2  36.3  27.9  26.8   ISTATAPO2  183*  105*  103*   ISTATATCO2  18*  17*  18*   [id number]  99  98  98   ISTATARTHCO3  16.7*  16.6*  17.0   ISTATARTBE  -9*  -7*  -6*   ISTATTEMP  36.9 C  97.3 F  37.0 C   ISTATFIO2  50  40  30   ISTATSPEC  Arterial  Arterial  Arterial   ISTATAPHTC  7.271*  7.394*  7.412   [id number]  183*  101*  103*     Recent Labs      04/18/19 1927   TROPONINI  <0.01     Recent Labs      04/18/19 2025   04/19/19 1052 04/20/19   0458   SODIUM  126*  135  135   POTASSIUM  3.9  4.1  3.6   CHLORIDE  96  104  106   CO2  20 20 19*   BUN  9  11  10   CREATININE  0.64  0.75  0.85   MAGNESIUM   --   1.9  1.8   PHOSPHORUS   --    --   3.2   CALCIUM  8.9  8.3*  8.6     Recent Labs      04/18/19 2025 04/19/19 1052 04/20/19   0458   ALTSGPT  8   --    --    ASTSGOT  23   --    --    ALKPHOSPHAT  130*   --    --    TBILIRUBIN  0.6   --    --    GLUCOSE  106*  97  110*     Recent Labs      04/18/19 2025 04/19/19 0249 04/19/19 1052 04/20/19   0458   WBC   --   14.4*  12.9*  10.0   NEUTSPOLYS   --   81.40*  69.20  64.20   LYMPHOCYTES   --   7.40*  13.30*  13.90*   MONOCYTES   --   9.90  14.20*  16.00*   EOSINOPHILS   --   0.10  1.70  4.50   BASOPHILS   --   0.20  0.00  0.50   ASTSGOT  23   --    --    --    ALTSGPT  8   --    --    --    ALKPHOSPHAT  130*   --    --    --    TBILIRUBIN  0.6   --    --    --      Recent Labs      04/18/19 1927 04/19/19 0249 04/19/19 1052 04/20/19   0458   RBC  3.89*  3.66*  3.71*  3.18*   HEMOGLOBIN  13.5*  12.5*  12.7*  10.9*   HEMATOCRIT  37.2*  35.6*  37.1*  31.5*   PLATELETCT  260  319  302  252   PROTHROMBTM  13.8   --    --    --    APTT  35.2   --    --    --    INR  1.05   --    --    --        Imaging  X-Ray:  I have personally reviewed the images and compared with prior images.  EKG:  I have personally reviewed the images and compared with prior images.  CT:    Reviewed   MRI reviewed    Assessment/Plan  Stroke (HCC)- (present on admission)   Assessment & Plan    tpa 4/18  Required intubation for mental status  No stroke on imaging  Early tpa for duration of symptoms per wife  Extubated 4/20  precedex drip agitation  Wean to seroquel  Asa and statin  Significant deficits on arrival         Delirium   Assessment & Plan    Secondary to stroke, medications and acute illness/hospital stay  Redirect as necessary  precedex drip  Wean to seroquel (48 hours  ordered)  mobilize     Pelvic fracture (HCC)   Assessment & Plan    Per patient wife had pelvic fracture 3/25\  Pain meds prn       Hypotension   Assessment & Plan    Levophed for map > 65. Weaned off   Fluid resuscitated     Acute respiratory failure with hypoxia (HCC)   Assessment & Plan    Intubated 4/18  Extubated 4/20  Rt/o2 protocol  Weaned sedation  Tolerated  seconary to altered mental status from encephalopathy  precedex for agitation               Essential hypertension   Assessment & Plan    BP control < 180   Use nicardipine if necessary.      CAD (coronary artery disease)- (present on admission)   Assessment & Plan    Aspirin per protocol statin     Hyponatremia- (present on admission)   Assessment & Plan    Target normal          VTE:  Contraindicated  Ulcer: H2 Antagonist  Lines: Central Line  Ongoing indication addressed, Arterial Line  Ongoing indication addressed and Ryan Catheter  Ongoing indication addressed    I have performed a physical exam and reviewed and updated ROS and Plan today (4/21/2019). In review of yesterday's note (4/20/2019), there are no changes except as documented above.     Discussed patient condition and risk of morbidity and/or mortality with Family, RN, RT, Pharmacy, , Code status disscussed, Charge nurse / hot rounds and neurology     The patient remains critically ill.  Precedex drip for agitation and delirium.  Critical care time = 32 minutes in directly providing and coordinating critical care and extensive data review.  No time overlap and excludes procedures.

## 2019-04-21 NOTE — DISCHARGE PLANNING
[facility] [address] [facility] [address] is pending bed assignment.       Patient's bed 521, RN report [phone]    REMSA  is [id number]

## 2019-04-21 NOTE — DISCHARGE PLANNING
Received a call from Transfer Center stating patient has been accepted by [facility], cobra started, MD informed regarding pending transfer, LSW placed call to Unit to provide update to bedside RN, LSW informed bedside RN currently not on unit and would be provided update. Pt's wife Izabel informed regarding pending transfer.

## 2019-04-21 NOTE — PROGRESS NOTES
· 2 RN skin check complete with CATHLEEN Taylor.  · Devices in place: cortrak, oxygen, PIV x1, restraints, bp cuff, pulse ox, SCDs, lemons catheter.  · Skin assessed under devices.  · Confirmed pressure ulcers found: none, pending wound consult.  · New potential pressure ulcers noted: bilateral heels, bilateral ankles, blanching redness to ears, redness under PIVs on R upper arm- PIVs removed, will monitor site, redness/excoriation to sacrum, redness under lemons catheter on scrotum- lemons catheter repositioned, skin tear to L forearm, scab on L hand and scab on R bottom of foot  · The following interventions in place: q2 hour turns, heels and elbows floated on pillows, silicone O2 tubing in place, floated off ears, cortrak floated off nose with tape and repositioned, bp cuff and pulse ox rotated, SCD tubing removed from under patient, lemons catheter repositioned off leg and scrotum, mepilex on bilateral heels, sacrum and L forearm, adhesive foam on L hand and bottom of R foot

## 2019-04-21 NOTE — THERAPY
Speech Language Therapy Clinical Swallow Evaluation completed.  Functional Status: Pt A&Ox2, disoriented to date, reason for hospitalization with periods of confusion at times. Pt reported he was living in [place], where he spends his summers, though wife reports they live in [place]. Pt able to follow 1-step directions with extra time and visual modeling to complete oral [facility] exam. No overt motor deficits identified. Pt is edentulous though has dentures with him. Dentures were not inserted today as no chewables were trialed. Vocal quality was mildly hoarse though volume was adequate. Pt produced volitional strong congested cough, though did not cough any secretions up to oral cavity. Pt was presented with ice chips x5, NTL via tsp/cup, thins via tsp/cup, purees. Oral phase was unremarkable. No oral residue visible in oral cavity post swallow with any textures. Pharyngeal swallow response was timely. However, hyolaryngeal excursion palpated as reduced with pt swallowing 3-5x per bolus, concerning for pharyngeal residue/weakness. Laryngeal pumping occurred several times as pt attempted to execute spontaneous f/u swallows, requiring cued throat clear before pt able to execute complete swallow. Though vocal quality was generally clear after the swallow, pt's eyes began watering, facial reddening and delayed coughing occurred with nectars and thins. Wet vocal quality occurred x1 with purees, following multiple swallows. Pt did report sensation of residue. A cued throat clear and f/u swallow was effective for vocal quality to return to baseline and for pt to report relief. Given pt is showing s/sx of aspiration, would recommend pt remain NPO with TF as alternative means of nutrition/hydration. Ok for pt to have 3 ice chips/hr with nsg supervision. SLP to follow.     Recommendations - Diet: NPO, Pre-Feeding Trials with SLP Only                          Strategies: Head of Bed at 90 Degrees                          " Medication Administration: Medication Administration : Via Gastric Tube  Plan of Care: Will benefit from Speech Therapy 5 times per week  Post-Acute Therapy: Recommend inpatient transitional care services for continued speech therapy services.        See \"Rehab Therapy-Acute\" Patient Summary Report for complete documentation.   "

## 2019-04-21 NOTE — ASSESSMENT & PLAN NOTE
Secondary to stroke, medications and acute illness/hospital stay  Redirect as necessary  precedex drip  Wean to seroquel (48 hours ordered)  mobilize

## 2019-04-21 NOTE — PROGRESS NOTES
· 2 RN skin check complete with Cami CONNOLLY.  · Devices in place ETT, Hallie appliance, OG EKG leads, PIV x2, NIBP Cuff, Pulse oximetry, Bilateral wrist restraints, SCD, Ryan, Stat lock,   · Skin assessed under devices  yes.  · Confirmed pressure ulcers found on na.  · New potential pressure ulcers noted on R & l heels red/purple, sacrum coccyx red/ L  Ball of foot red/scabbed/ L ankle red, L hand skin tear, Left forearm skin tear.  ·  Wound consult placed and wound reported.  · The following interventions in place All devices padded, elbow and heels floated on pillows. Pt turned Q 2 hrs, repositioned with pillows, Mepilex to R & L heels, mepilex to sacrum, left forearm and hand biatin foam in place. Ett repositioned Q 2 hrs. NIBP cuff and oximetry rotated sites Q 2 hrs.

## 2019-04-22 NOTE — DISCHARGE INSTRUCTIONS
Discharge Instructions    Discharged to [facility] by car with escort. Discharged via ambulance, hospital escort: Yes.  Special equipment needed: Not Applicable    Be sure to schedule a follow-up appointment with your primary care doctor or any specialists as instructed.     Discharge Plan:   Diet Plan: Discussed  Activity Level: Discussed  Confirmed Follow up Appointment: No (Comments) (pt transferring to [facility])  Confirmed Symptoms Management: Discussed  Medication Reconciliation Updated: Yes  Pneumococcal Vaccine Administered/Refused: Not given - Patient refused pneumococcal vaccine (pt wife says pt is up to date on everything he needs)  Influenza Vaccine Indication: Not indicated: Previously immunized this influenza season and > 8 years of age    I understand that a diet low in cholesterol, fat, and sodium is recommended for good health. Unless I have been given specific instructions below for another diet, I accept this instruction as my diet prescription.   Other diet: NPO    Special Instructions: None    · Is patient discharged on Warfarin / Coumadin?   No     Depression / Suicide Risk    As you are discharged from this [facility] facility, it is important to learn how to keep safe from harming yourself.    Recognize the warning signs:  · Abrupt changes in personality, positive or negative- including increase in energy   · Giving away possessions  · Change in eating patterns- significant weight changes-  positive or negative  · Change in sleeping patterns- unable to sleep or sleeping all the time   · Unwillingness or inability to communicate  · Depression  · Unusual sadness, discouragement and loneliness  · Talk of wanting to die  · Neglect of personal appearance   · Rebelliousness- reckless behavior  · Withdrawal from people/activities they love  · Confusion- inability to concentrate     If you or a loved one observes any of these behaviors or has concerns about self-harm, here's what you can  do:  · Talk about it- your feelings and reasons for harming yourself  · Remove any means that you might use to hurt yourself (examples: pills, rope, extension cords, firearm)  · Get professional help from the community (Mental Health, Substance Abuse, psychological counseling)  · Do not be alone:Call your Safe Contact- someone whom you trust who will be there for you.  · Call your local CRISIS HOTLINE 351-0134 or 741-607-9519  · Call your local Children's Mobile Crisis Response Team [place] (521) 858-1005 or www.[employer]  · Call the toll free National Suicide Prevention Hotlines   · National Suicide Prevention Lifeline 699-268-GIUN (3640)  · National Hope Line Network 800-SUICIDE (262-2611)

## 2019-04-22 NOTE — PROGRESS NOTES
Pt transferred from [facility] to [facility] room 521. Report called to Serena CONNOLLY prior to pt d/c. All questions answered. Pt transferred with [facility]. Report given to [facility] prior to d/c. Pt sent with cobra, chart, d/c summary and med rec. Dr Ferguson and Dr Johnson aware of pt transfer. Pt alert and oriented on transfer. Wife Izabel with patient. Pt transferred with all belongings including dentures. Cortrak and PIV in place. Ryan removed prior to transfer

## 2019-07-23 ENCOUNTER — TELEPHONE (OUTPATIENT)
Dept: PHYSICAL THERAPY | Facility: MEDICAL CENTER | Age: 84
End: 2019-07-23

## 2019-07-23 NOTE — THERAPY
Called patient between 75 and 120 days after discharge. Assessed Modified [place] Scale to be mRs 2  Pt doesn't hear well over the phone so the information came from his wife.

## 2019-10-10 ENCOUNTER — IMMUNIZATION (OUTPATIENT)
Dept: SOCIAL WORK | Facility: CLINIC | Age: 84
End: 2019-10-10
Payer: MEDICARE

## 2019-10-10 DIAGNOSIS — Z23 NEED FOR VACCINATION: ICD-10-CM

## 2019-10-10 PROCEDURE — 90662 IIV NO PRSV INCREASED AG IM: CPT | Performed by: REGISTERED NURSE

## 2019-10-10 PROCEDURE — G0008 ADMIN INFLUENZA VIRUS VAC: HCPCS | Performed by: REGISTERED NURSE

## 2020-09-09 ENCOUNTER — APPOINTMENT (OUTPATIENT)
Dept: RADIOLOGY | Facility: MEDICAL CENTER | Age: 85
DRG: 100 | End: 2020-09-09
Attending: EMERGENCY MEDICINE
Payer: MEDICARE

## 2020-09-09 ENCOUNTER — HOSPITAL ENCOUNTER (INPATIENT)
Facility: MEDICAL CENTER | Age: 85
LOS: 5 days | DRG: 100 | End: 2020-09-14
Attending: EMERGENCY MEDICINE | Admitting: HOSPITALIST
Payer: MEDICARE

## 2020-09-09 ENCOUNTER — APPOINTMENT (OUTPATIENT)
Dept: RADIOLOGY | Facility: MEDICAL CENTER | Age: 85
DRG: 100 | End: 2020-09-09
Attending: INTERNAL MEDICINE
Payer: MEDICARE

## 2020-09-09 DIAGNOSIS — R56.9 SEIZURE (HCC): ICD-10-CM

## 2020-09-09 DIAGNOSIS — R41.82 ALTERED MENTAL STATUS, UNSPECIFIED ALTERED MENTAL STATUS TYPE: ICD-10-CM

## 2020-09-09 DIAGNOSIS — Z86.73 HISTORY OF STROKE: ICD-10-CM

## 2020-09-09 PROBLEM — E87.20 LACTIC ACIDOSIS: Status: ACTIVE | Noted: 2020-09-09

## 2020-09-09 PROBLEM — E87.1 HYPONATREMIA: Status: ACTIVE | Noted: 2020-09-09

## 2020-09-09 PROBLEM — J96.01 ACUTE RESPIRATORY FAILURE WITH HYPOXIA (HCC): Status: ACTIVE | Noted: 2020-09-09

## 2020-09-09 PROBLEM — G93.40 ENCEPHALOPATHY: Status: ACTIVE | Noted: 2020-09-09

## 2020-09-09 PROBLEM — D72.829 LEUKOCYTOSIS: Status: ACTIVE | Noted: 2020-09-09

## 2020-09-09 LAB
ACTION RANGE TRIGGERED IACRT: YES
ALBUMIN SERPL BCP-MCNC: 3.7 G/DL (ref 3.2–4.9)
ALBUMIN/GLOB SERPL: 1.2 G/DL
ALP SERPL-CCNC: 64 U/L (ref 30–99)
ALT SERPL-CCNC: 13 U/L (ref 2–50)
ANION GAP SERPL CALC-SCNC: 15 MMOL/L (ref 7–16)
ANION GAP SERPL CALC-SCNC: 18 MMOL/L (ref 7–16)
APPEARANCE UR: CLEAR
APTT PPP: 27.8 SEC (ref 24.7–36)
AST SERPL-CCNC: 38 U/L (ref 12–45)
BACTERIA #/AREA URNS HPF: NEGATIVE /HPF
BASE EXCESS BLDA CALC-SCNC: -9 MMOL/L (ref -4–3)
BASOPHILS # BLD AUTO: 0.4 % (ref 0–1.8)
BASOPHILS # BLD: 0.05 K/UL (ref 0–0.12)
BILIRUB SERPL-MCNC: 0.4 MG/DL (ref 0.1–1.5)
BILIRUB UR QL STRIP.AUTO: NEGATIVE
BODY TEMPERATURE: ABNORMAL DEGREES
BUN SERPL-MCNC: 7 MG/DL (ref 8–22)
BUN SERPL-MCNC: 9 MG/DL (ref 8–22)
CALCIUM SERPL-MCNC: 8.2 MG/DL (ref 8.5–10.5)
CALCIUM SERPL-MCNC: 8.3 MG/DL (ref 8.5–10.5)
CHLORIDE SERPL-SCNC: 95 MMOL/L (ref 96–112)
CHLORIDE SERPL-SCNC: 96 MMOL/L (ref 96–112)
CO2 BLDA-SCNC: 19 MMOL/L (ref 20–33)
CO2 SERPL-SCNC: 16 MMOL/L (ref 20–33)
CO2 SERPL-SCNC: 18 MMOL/L (ref 20–33)
COLOR UR: YELLOW
COVID ORDER STATUS COVID19: NORMAL
CREAT SERPL-MCNC: 0.87 MG/DL (ref 0.5–1.4)
CREAT SERPL-MCNC: 0.87 MG/DL (ref 0.5–1.4)
EKG IMPRESSION: NORMAL
EOSINOPHIL # BLD AUTO: 0.23 K/UL (ref 0–0.51)
EOSINOPHIL NFR BLD: 1.8 % (ref 0–6.9)
EPI CELLS #/AREA URNS HPF: NEGATIVE /HPF
ERYTHROCYTE [DISTWIDTH] IN BLOOD BY AUTOMATED COUNT: 46.3 FL (ref 35.9–50)
GLOBULIN SER CALC-MCNC: 3 G/DL (ref 1.9–3.5)
GLUCOSE BLD-MCNC: 127 MG/DL (ref 65–99)
GLUCOSE SERPL-MCNC: 120 MG/DL (ref 65–99)
GLUCOSE SERPL-MCNC: 126 MG/DL (ref 65–99)
GLUCOSE UR STRIP.AUTO-MCNC: NEGATIVE MG/DL
HCO3 BLDA-SCNC: 17.7 MMOL/L (ref 17–25)
HCT VFR BLD AUTO: 41.6 % (ref 42–52)
HGB BLD-MCNC: 14.5 G/DL (ref 14–18)
HOROWITZ INDEX BLDA+IHG-RTO: 315 MM[HG]
IMM GRANULOCYTES # BLD AUTO: 0.12 K/UL (ref 0–0.11)
IMM GRANULOCYTES NFR BLD AUTO: 0.9 % (ref 0–0.9)
INR PPP: 1.04 (ref 0.87–1.13)
INR PPP: 1.08 (ref 0.87–1.13)
INST. QUALIFIED PATIENT IIQPT: YES
KETONES UR STRIP.AUTO-MCNC: ABNORMAL MG/DL
LACTATE BLD-SCNC: 1.6 MMOL/L (ref 0.5–2)
LACTATE BLD-SCNC: 1.7 MMOL/L (ref 0.5–2)
LACTATE BLD-SCNC: 2.9 MMOL/L (ref 0.5–2)
LACTATE BLD-SCNC: 6.5 MMOL/L (ref 0.5–2)
LEUKOCYTE ESTERASE UR QL STRIP.AUTO: NEGATIVE
LYMPHOCYTES # BLD AUTO: 1.55 K/UL (ref 1–4.8)
LYMPHOCYTES NFR BLD: 12.2 % (ref 22–41)
MAGNESIUM SERPL-MCNC: 2.1 MG/DL (ref 1.5–2.5)
MCH RBC QN AUTO: 35.9 PG (ref 27–33)
MCHC RBC AUTO-ENTMCNC: 34.9 G/DL (ref 33.7–35.3)
MCV RBC AUTO: 103 FL (ref 81.4–97.8)
MICRO URNS: ABNORMAL
MONOCYTES # BLD AUTO: 1.2 K/UL (ref 0–0.85)
MONOCYTES NFR BLD AUTO: 9.5 % (ref 0–13.4)
NEUTROPHILS # BLD AUTO: 9.53 K/UL (ref 1.82–7.42)
NEUTROPHILS NFR BLD: 75.2 % (ref 44–72)
NITRITE UR QL STRIP.AUTO: NEGATIVE
NRBC # BLD AUTO: 0 K/UL
NRBC BLD-RTO: 0 /100 WBC
O2/TOTAL GAS SETTING VFR VENT: 40 %
PCO2 BLDA: 39 MMHG (ref 26–37)
PH BLDA: 7.27 [PH] (ref 7.4–7.5)
PH UR STRIP.AUTO: 6 [PH] (ref 5–8)
PHOSPHATE SERPL-MCNC: 2.4 MG/DL (ref 2.5–4.5)
PLATELET # BLD AUTO: 194 K/UL (ref 164–446)
PMV BLD AUTO: 9.8 FL (ref 9–12.9)
PO2 BLDA: 126 MMHG (ref 64–87)
POTASSIUM SERPL-SCNC: 3.9 MMOL/L (ref 3.6–5.5)
POTASSIUM SERPL-SCNC: 4.2 MMOL/L (ref 3.6–5.5)
PROT SERPL-MCNC: 6.7 G/DL (ref 6–8.2)
PROT UR QL STRIP: 100 MG/DL
PROTHROMBIN TIME: 14 SEC (ref 12–14.6)
PROTHROMBIN TIME: 14.3 SEC (ref 12–14.6)
RBC # BLD AUTO: 4.04 M/UL (ref 4.7–6.1)
RBC # URNS HPF: ABNORMAL /HPF
RBC UR QL AUTO: ABNORMAL
SAO2 % BLDA: 98 % (ref 93–99)
SARS-COV-2 RNA RESP QL NAA+PROBE: NOTDETECTED
SODIUM SERPL-SCNC: 129 MMOL/L (ref 135–145)
SODIUM SERPL-SCNC: 129 MMOL/L (ref 135–145)
SP GR UR REFRACTOMETRY: 1.01
SPECIMEN DRAWN FROM PATIENT: ABNORMAL
SPECIMEN SOURCE: NORMAL
TRIGL SERPL-MCNC: 136 MG/DL (ref 0–149)
TROPONIN T SERPL-MCNC: 30 NG/L (ref 6–19)
TROPONIN T SERPL-MCNC: 63 NG/L (ref 6–19)
TROPONIN T SERPL-MCNC: 72 NG/L (ref 6–19)
UROBILINOGEN UR STRIP.AUTO-MCNC: 0.2 MG/DL
WBC # BLD AUTO: 12.7 K/UL (ref 4.8–10.8)
WBC #/AREA URNS HPF: ABNORMAL /HPF

## 2020-09-09 PROCEDURE — 83605 ASSAY OF LACTIC ACID: CPT

## 2020-09-09 PROCEDURE — 70450 CT HEAD/BRAIN W/O DYE: CPT

## 2020-09-09 PROCEDURE — 71045 X-RAY EXAM CHEST 1 VIEW: CPT

## 2020-09-09 PROCEDURE — 0042T CT-CEREBRAL PERFUSION ANALYSIS: CPT

## 2020-09-09 PROCEDURE — C9803 HOPD COVID-19 SPEC COLLECT: HCPCS | Performed by: INTERNAL MEDICINE

## 2020-09-09 PROCEDURE — 36600 WITHDRAWAL OF ARTERIAL BLOOD: CPT

## 2020-09-09 PROCEDURE — 82962 GLUCOSE BLOOD TEST: CPT

## 2020-09-09 PROCEDURE — 81001 URINALYSIS AUTO W/SCOPE: CPT

## 2020-09-09 PROCEDURE — 96366 THER/PROPH/DIAG IV INF ADDON: CPT

## 2020-09-09 PROCEDURE — 99291 CRITICAL CARE FIRST HOUR: CPT

## 2020-09-09 PROCEDURE — 84478 ASSAY OF TRIGLYCERIDES: CPT

## 2020-09-09 PROCEDURE — 700117 HCHG RX CONTRAST REV CODE 255: Performed by: EMERGENCY MEDICINE

## 2020-09-09 PROCEDURE — 700101 HCHG RX REV CODE 250

## 2020-09-09 PROCEDURE — 0BH17EZ INSERTION OF ENDOTRACHEAL AIRWAY INTO TRACHEA, VIA NATURAL OR ARTIFICIAL OPENING: ICD-10-PCS | Performed by: EMERGENCY MEDICINE

## 2020-09-09 PROCEDURE — 96376 TX/PRO/DX INJ SAME DRUG ADON: CPT

## 2020-09-09 PROCEDURE — 93005 ELECTROCARDIOGRAM TRACING: CPT | Performed by: INTERNAL MEDICINE

## 2020-09-09 PROCEDURE — 85610 PROTHROMBIN TIME: CPT | Mod: 91

## 2020-09-09 PROCEDURE — 99223 1ST HOSP IP/OBS HIGH 75: CPT | Mod: 25,GC | Performed by: PSYCHIATRY & NEUROLOGY

## 2020-09-09 PROCEDURE — 95816 EEG AWAKE AND DROWSY: CPT | Mod: 26 | Performed by: PSYCHIATRY & NEUROLOGY

## 2020-09-09 PROCEDURE — 700111 HCHG RX REV CODE 636 W/ 250 OVERRIDE (IP): Performed by: PSYCHIATRY & NEUROLOGY

## 2020-09-09 PROCEDURE — 87040 BLOOD CULTURE FOR BACTERIA: CPT | Mod: 91

## 2020-09-09 PROCEDURE — 303105 HCHG CATHETER EXTRA

## 2020-09-09 PROCEDURE — 700105 HCHG RX REV CODE 258: Performed by: EMERGENCY MEDICINE

## 2020-09-09 PROCEDURE — U0003 INFECTIOUS AGENT DETECTION BY NUCLEIC ACID (DNA OR RNA); SEVERE ACUTE RESPIRATORY SYNDROME CORONAVIRUS 2 (SARS-COV-2) (CORONAVIRUS DISEASE [COVID-19]), AMPLIFIED PROBE TECHNIQUE, MAKING USE OF HIGH THROUGHPUT TECHNOLOGIES AS DESCRIBED BY CMS-2020-01-R: HCPCS

## 2020-09-09 PROCEDURE — 80053 COMPREHEN METABOLIC PANEL: CPT

## 2020-09-09 PROCEDURE — 770022 HCHG ROOM/CARE - ICU (200)

## 2020-09-09 PROCEDURE — 99291 CRITICAL CARE FIRST HOUR: CPT | Performed by: INTERNAL MEDICINE

## 2020-09-09 PROCEDURE — 84100 ASSAY OF PHOSPHORUS: CPT

## 2020-09-09 PROCEDURE — 700111 HCHG RX REV CODE 636 W/ 250 OVERRIDE (IP): Performed by: INTERNAL MEDICINE

## 2020-09-09 PROCEDURE — 85025 COMPLETE CBC W/AUTO DIFF WBC: CPT

## 2020-09-09 PROCEDURE — 51702 INSERT TEMP BLADDER CATH: CPT

## 2020-09-09 PROCEDURE — 95816 EEG AWAKE AND DROWSY: CPT | Performed by: PSYCHIATRY & NEUROLOGY

## 2020-09-09 PROCEDURE — 4A10X4Z MONITORING OF CENTRAL NERVOUS ELECTRICAL ACTIVITY, EXTERNAL APPROACH: ICD-10-PCS | Performed by: PSYCHIATRY & NEUROLOGY

## 2020-09-09 PROCEDURE — 96365 THER/PROPH/DIAG IV INF INIT: CPT

## 2020-09-09 PROCEDURE — 700111 HCHG RX REV CODE 636 W/ 250 OVERRIDE (IP): Performed by: EMERGENCY MEDICINE

## 2020-09-09 PROCEDURE — 700105 HCHG RX REV CODE 258: Performed by: INTERNAL MEDICINE

## 2020-09-09 PROCEDURE — 94002 VENT MGMT INPAT INIT DAY: CPT

## 2020-09-09 PROCEDURE — 31500 INSERT EMERGENCY AIRWAY: CPT

## 2020-09-09 PROCEDURE — 96375 TX/PRO/DX INJ NEW DRUG ADDON: CPT

## 2020-09-09 PROCEDURE — 93010 ELECTROCARDIOGRAM REPORT: CPT | Performed by: INTERNAL MEDICINE

## 2020-09-09 PROCEDURE — 82803 BLOOD GASES ANY COMBINATION: CPT

## 2020-09-09 PROCEDURE — 700111 HCHG RX REV CODE 636 W/ 250 OVERRIDE (IP)

## 2020-09-09 PROCEDURE — 83735 ASSAY OF MAGNESIUM: CPT

## 2020-09-09 PROCEDURE — 70496 CT ANGIOGRAPHY HEAD: CPT

## 2020-09-09 PROCEDURE — 84484 ASSAY OF TROPONIN QUANT: CPT | Mod: 91

## 2020-09-09 PROCEDURE — 85730 THROMBOPLASTIN TIME PARTIAL: CPT

## 2020-09-09 PROCEDURE — 80048 BASIC METABOLIC PNL TOTAL CA: CPT

## 2020-09-09 PROCEDURE — 94770 HCHG CO2 EXPIRED GAS DETERMINATION: CPT

## 2020-09-09 PROCEDURE — 5A1945Z RESPIRATORY VENTILATION, 24-96 CONSECUTIVE HOURS: ICD-10-PCS | Performed by: EMERGENCY MEDICINE

## 2020-09-09 RX ORDER — POLYETHYLENE GLYCOL 3350 17 G/17G
1 POWDER, FOR SOLUTION ORAL
Status: DISCONTINUED | OUTPATIENT
Start: 2020-09-09 | End: 2020-09-14 | Stop reason: HOSPADM

## 2020-09-09 RX ORDER — ACETAMINOPHEN 325 MG/1
650 TABLET ORAL EVERY 6 HOURS PRN
Status: DISCONTINUED | OUTPATIENT
Start: 2020-09-09 | End: 2020-09-14 | Stop reason: HOSPADM

## 2020-09-09 RX ORDER — ENALAPRILAT 1.25 MG/ML
1.25 INJECTION INTRAVENOUS EVERY 6 HOURS PRN
Status: DISCONTINUED | OUTPATIENT
Start: 2020-09-09 | End: 2020-09-14 | Stop reason: HOSPADM

## 2020-09-09 RX ORDER — BISACODYL 10 MG
10 SUPPOSITORY, RECTAL RECTAL
Status: DISCONTINUED | OUTPATIENT
Start: 2020-09-09 | End: 2020-09-14 | Stop reason: HOSPADM

## 2020-09-09 RX ORDER — ONDANSETRON 2 MG/ML
4 INJECTION INTRAMUSCULAR; INTRAVENOUS EVERY 4 HOURS PRN
Status: DISCONTINUED | OUTPATIENT
Start: 2020-09-09 | End: 2020-09-14 | Stop reason: HOSPADM

## 2020-09-09 RX ORDER — ONDANSETRON 4 MG/1
4 TABLET, ORALLY DISINTEGRATING ORAL EVERY 4 HOURS PRN
Status: DISCONTINUED | OUTPATIENT
Start: 2020-09-09 | End: 2020-09-14 | Stop reason: HOSPADM

## 2020-09-09 RX ORDER — KETAMINE HYDROCHLORIDE 50 MG/ML
100 INJECTION, SOLUTION INTRAMUSCULAR; INTRAVENOUS ONCE
Status: COMPLETED | OUTPATIENT
Start: 2020-09-09 | End: 2020-09-09

## 2020-09-09 RX ORDER — LEVETIRACETAM 15 MG/ML
1500 INJECTION INTRAVASCULAR ONCE
Status: COMPLETED | OUTPATIENT
Start: 2020-09-09 | End: 2020-09-09

## 2020-09-09 RX ORDER — KETAMINE HYDROCHLORIDE 50 MG/ML
INJECTION, SOLUTION INTRAMUSCULAR; INTRAVENOUS
Status: COMPLETED
Start: 2020-09-09 | End: 2020-09-09

## 2020-09-09 RX ORDER — SODIUM CHLORIDE 9 MG/ML
1000 INJECTION, SOLUTION INTRAVENOUS ONCE
Status: COMPLETED | OUTPATIENT
Start: 2020-09-09 | End: 2020-09-09

## 2020-09-09 RX ORDER — IPRATROPIUM BROMIDE AND ALBUTEROL SULFATE 2.5; .5 MG/3ML; MG/3ML
3 SOLUTION RESPIRATORY (INHALATION)
Status: DISCONTINUED | OUTPATIENT
Start: 2020-09-09 | End: 2020-09-14 | Stop reason: HOSPADM

## 2020-09-09 RX ORDER — FINASTERIDE 5 MG/1
5 TABLET, FILM COATED ORAL DAILY
COMMUNITY

## 2020-09-09 RX ORDER — AMOXICILLIN 250 MG
2 CAPSULE ORAL 2 TIMES DAILY
Status: DISCONTINUED | OUTPATIENT
Start: 2020-09-10 | End: 2020-09-14 | Stop reason: HOSPADM

## 2020-09-09 RX ORDER — LEVETIRACETAM 5 MG/ML
500 INJECTION INTRAVASCULAR EVERY 12 HOURS
Status: DISCONTINUED | OUTPATIENT
Start: 2020-09-09 | End: 2020-09-10

## 2020-09-09 RX ORDER — LEVETIRACETAM 5 MG/ML
500 INJECTION INTRAVASCULAR ONCE
Status: COMPLETED | OUTPATIENT
Start: 2020-09-09 | End: 2020-09-09

## 2020-09-09 RX ORDER — SODIUM CHLORIDE, SODIUM LACTATE, POTASSIUM CHLORIDE, CALCIUM CHLORIDE 600; 310; 30; 20 MG/100ML; MG/100ML; MG/100ML; MG/100ML
INJECTION, SOLUTION INTRAVENOUS CONTINUOUS
Status: DISCONTINUED | OUTPATIENT
Start: 2020-09-09 | End: 2020-09-10

## 2020-09-09 RX ORDER — DEXTROSE MONOHYDRATE 25 G/50ML
50 INJECTION, SOLUTION INTRAVENOUS
Status: DISCONTINUED | OUTPATIENT
Start: 2020-09-09 | End: 2020-09-12

## 2020-09-09 RX ORDER — FUROSEMIDE 20 MG/1
20 TABLET ORAL DAILY
COMMUNITY

## 2020-09-09 RX ORDER — FAMOTIDINE 20 MG/1
20 TABLET, FILM COATED ORAL EVERY 12 HOURS
Status: DISCONTINUED | OUTPATIENT
Start: 2020-09-09 | End: 2020-09-10

## 2020-09-09 RX ORDER — LABETALOL HYDROCHLORIDE 5 MG/ML
10 INJECTION, SOLUTION INTRAVENOUS EVERY 4 HOURS PRN
Status: DISCONTINUED | OUTPATIENT
Start: 2020-09-09 | End: 2020-09-14 | Stop reason: HOSPADM

## 2020-09-09 RX ORDER — METOPROLOL SUCCINATE 100 MG/1
100 TABLET, EXTENDED RELEASE ORAL DAILY
COMMUNITY

## 2020-09-09 RX ORDER — SIMVASTATIN 20 MG
20 TABLET ORAL NIGHTLY
COMMUNITY

## 2020-09-09 RX ORDER — ONDANSETRON 4 MG/1
4 TABLET, ORALLY DISINTEGRATING ORAL EVERY 4 HOURS PRN
Status: DISCONTINUED | OUTPATIENT
Start: 2020-09-09 | End: 2020-09-09

## 2020-09-09 RX ADMIN — PROPOFOL 100 MG: 10 INJECTION, EMULSION INTRAVENOUS at 12:04

## 2020-09-09 RX ADMIN — IOHEXOL 40 ML: 350 INJECTION, SOLUTION INTRAVENOUS at 12:52

## 2020-09-09 RX ADMIN — LEVETIRACETAM INJECTION 500 MG: 5 INJECTION INTRAVENOUS at 20:31

## 2020-09-09 RX ADMIN — FAMOTIDINE 20 MG: 10 INJECTION INTRAVENOUS at 18:33

## 2020-09-09 RX ADMIN — FENTANYL CITRATE 100 MCG: 50 INJECTION INTRAMUSCULAR; INTRAVENOUS at 16:21

## 2020-09-09 RX ADMIN — SODIUM CHLORIDE, POTASSIUM CHLORIDE, SODIUM LACTATE AND CALCIUM CHLORIDE: 600; 310; 30; 20 INJECTION, SOLUTION INTRAVENOUS at 14:59

## 2020-09-09 RX ADMIN — LEVETIRACETAM INJECTION 1500 MG: 15 INJECTION INTRAVENOUS at 14:20

## 2020-09-09 RX ADMIN — KETAMINE HYDROCHLORIDE 100 MG: 50 INJECTION INTRAMUSCULAR; INTRAVENOUS at 12:01

## 2020-09-09 RX ADMIN — FENTANYL CITRATE 25 MCG: 50 INJECTION, SOLUTION INTRAMUSCULAR; INTRAVENOUS at 17:16

## 2020-09-09 RX ADMIN — KETAMINE HYDROCHLORIDE 100 MG: 50 INJECTION, SOLUTION INTRAMUSCULAR; INTRAVENOUS at 12:01

## 2020-09-09 RX ADMIN — PROPOFOL 5 MCG/KG/MIN: 10 INJECTION, EMULSION INTRAVENOUS at 12:15

## 2020-09-09 RX ADMIN — PROPOFOL 60 MCG/KG/MIN: 10 INJECTION, EMULSION INTRAVENOUS at 15:49

## 2020-09-09 RX ADMIN — SODIUM CHLORIDE 1000 ML: 9 INJECTION, SOLUTION INTRAVENOUS at 12:05

## 2020-09-09 RX ADMIN — PROPOFOL 20 MCG/KG/MIN: 10 INJECTION, EMULSION INTRAVENOUS at 23:15

## 2020-09-09 RX ADMIN — IOHEXOL 80 ML: 350 INJECTION, SOLUTION INTRAVENOUS at 12:53

## 2020-09-09 RX ADMIN — ENALAPRILAT 1.25 MG: 1.25 INJECTION INTRAVENOUS at 22:06

## 2020-09-09 RX ADMIN — LEVETIRACETAM INJECTION 500 MG: 5 INJECTION INTRAVENOUS at 13:45

## 2020-09-09 ASSESSMENT — PAIN DESCRIPTION - PAIN TYPE
TYPE: ACUTE PAIN
TYPE: ACUTE PAIN

## 2020-09-09 ASSESSMENT — FIBROSIS 4 INDEX: FIB4 SCORE: 4.51

## 2020-09-09 NOTE — ED NOTES
UNABLE to address med rec, no pharmacy or demographics on file.   Pt unable to participate in interview

## 2020-09-09 NOTE — CONSULTS
Critical Care Consultation    Date of consult: 9/9/2020    Referring Physician  Su Siddiqui D.O.    Reason for Consultation  Acute respiratory failure, seizure    History of Presenting Illness  84 yo male who presented 9/9/2020 with seizures and respiratory failure. Patient was in his usual state of health until this morning when he was sitting in a chair and started to have sudden onset RUE weakness and inability to speak. He subsequently developed a witnessed (by wife) generalized shaking episode concerning for seizure lasting a 5-15 min until EMS arrived and administered versed. Post-versed, patient's shaking stopped but then started to snore and desaturate prompting nasal intubation by the FD. [facility] was dispatched and transported patient on a vent. Required additional versed en route for agitation. In ED underwent CT imaging and neuro was consulted. Starting to awaken on my exam without ongoing seizure-like activity.    Code Status  Full Code    Review of Systems  Review of Systems   Unable to perform ROS: Intubated     Past Medical History   has a past medical history of Seizure disorder (HCC) and Stroke (HCC). - CAD, HTN, HLD    Surgical History   has no past surgical history on file. - CABG x 4, cardiac stents, cholecystectomy    Family History  family history is not on file. - no CVA nor seizure    Social History   Neg alcohol, tobacco, drugs.  and lives in [place], [place]    Medications  Home Medications     Reviewed by Brianne Wall (Pharmacy Tech) on 09/09/20 at 1322  Med List Status: Not Addressed   Medication Last Dose Status        Patient Gelacio Taking any Medications                    * see RN notes    Current Facility-Administered Medications   Medication Dose Route Frequency Provider Last Rate Last Dose   • Respiratory Therapy Consult   Nebulization Continuous RT Jeremy M Gonda, M.D.       • ipratropium-albuterol (DUONEB) nebulizer solution  3 mL Nebulization Q2HRS PRN  (RT) Jeremy M Gonda, M.D.       • famotidine (PEPCID) tablet 20 mg  20 mg Enteral Tube Q12HRS Jeremy M Gonda, M.D.        Or   • famotidine (PEPCID) injection 20 mg  20 mg Intravenous Q12HRS Jeremy M Gonda, M.D.       • [START ON 9/10/2020] senna-docusate (PERICOLACE or SENOKOT S) 8.6-50 MG per tablet 2 Tab  2 Tab Enteral Tube BID Jeremy M Gonda, M.D.        And   • polyethylene glycol/lytes (MIRALAX) PACKET 1 Packet  1 Packet Enteral Tube QDAY PRN Jeremy M Gonda, M.D.        And   • magnesium hydroxide (MILK OF MAGNESIA) suspension 30 mL  30 mL Enteral Tube QDAY PRN Jeremy M Gonda, M.D.        And   • bisacodyl (DULCOLAX) suppository 10 mg  10 mg Rectal QDAY PRN Jeremy M Gonda, M.D.       • MD Alert...ICU Electrolyte Replacement per Pharmacy   Other PHARMACY TO DOSE Jeremy M Gonda, M.D.       • lidocaine (XYLOCAINE) 1 % injection 1-2 mL  1-2 mL Tracheal Tube Q30 MIN PRN Jeremy M Gonda, M.D.       • lactated ringers infusion   Intravenous Continuous Jeremy M Gonda, M.D.       • [START ON 9/10/2020] enoxaparin (LOVENOX) inj 40 mg  40 mg Subcutaneous DAILY Jeremy M Gonda, M.D.       • acetaminophen (TYLENOL) tablet 650 mg  650 mg Enteral Tube Q6HRS PRN Jeremy M Gonda, M.D.       • enalaprilat (VASOTEC) injection 1.25 mg  1.25 mg Intravenous Q6HRS PRN Jeremy M Gonda, M.D.       • labetalol (NORMODYNE/TRANDATE) injection 10 mg  10 mg Intravenous Q4HRS PRN Jeremy M Gonda, M.D.       • ondansetron (ZOFRAN) syringe/vial injection 4 mg  4 mg Intravenous Q4HRS PRN Jeremy M Gonda, M.D.       • fentaNYL (SUBLIMAZE) injection 100 mcg  100 mcg Intravenous Q15 MIN PRN Jeremy M Gonda, M.D.        And   • fentaNYL (SUBLIMAZE) injection 200 mcg  200 mcg Intravenous Q15 MIN PRN Jeremy M Gonda, M.D.        And   • fentaNYL (SUBLIMAZE) 50 mcg/mL in 50mL (Continuous Infusion)   Intravenous Continuous Jeremy M Gonda, M.D.        And   • propofol (DIPRIVAN) injection  0-80 mcg/kg/min Intravenous Continuous Jeremy M Gonda, M.D.       •  insulin regular (HumuLIN R,NovoLIN R) injection  1-6 Units Subcutaneous Q6HRS Jeremy M Gonda, M.D.        And   • glucose 4 g chewable tablet 16 g  16 g Oral Q15 MIN PRN Jeremy M Gonda, M.D.        And   • dextrose 50% (D50W) injection 50 mL  50 mL Intravenous Q15 MIN PRN Jeremy M Gonda, M.D.       • levETIRAcetam (Keppra) 500 mg in 100 mL NaCl IV premix  500 mg Intravenous Q12HRS Jeremy M Gonda, M.D.       • levETIRAcetam (Keppra) 1500 mg in 100 mL NaCl IV premix  1,500 mg Intravenous Anisa Underwood M.D.       • ondansetron (ZOFRAN ODT) dispertab 4 mg  4 mg Enteral Tube Q4HRS PRN Jeremy M Gonda, M.D.         No current outpatient medications on file.       Allergies  Not on File - NKDA    Vital Signs last 24 hours  Temp:  [36.8 °C (98.3 °F)] 36.8 °C (98.3 °F)  Pulse:  [] 86  Resp:  [17-30] 30  BP: ()/(50-75) 135/65  SpO2:  [98 %-100 %] 100 %    Physical Exam  Physical Exam  Vitals signs and nursing note reviewed.   Constitutional:       Appearance: He is well-developed and normal weight. He is ill-appearing. He is not toxic-appearing.      Interventions: He is sedated, intubated and restrained.   HENT:      Head: Normocephalic and atraumatic.      Right Ear: External ear normal.      Left Ear: External ear normal.      Nose: Nose normal. No congestion.      Comments: Nasogastric tube in position     Mouth/Throat:      Mouth: Mucous membranes are moist.      Pharynx: Oropharynx is clear.      Comments: ETT in position  Eyes:      General: No scleral icterus.     Conjunctiva/sclera: Conjunctivae normal.      Pupils: Pupils are equal, round, and reactive to light.      Comments: No roving eye movements nor lateral gaze preference   Neck:      Musculoskeletal: Neck supple. No neck rigidity.      Comments: No meningismus  Cardiovascular:      Rate and Rhythm: Regular rhythm. Tachycardia present. Occasional extrasystoles are present.     Chest Wall: PMI is displaced.      Pulses: Normal pulses.       Heart sounds: Heart sounds not distant. No murmur.      Comments: Scar from prior CABG  Pulmonary:      Effort: No tachypnea or prolonged expiration. He is intubated.      Breath sounds: Examination of the left-lower field reveals rhonchi. Rhonchi present. No wheezing or rales.      Comments: Good compliance  Abdominal:      General: Bowel sounds are normal. There is no distension.      Palpations: Abdomen is soft.      Tenderness: There is no abdominal tenderness. There is no guarding.   Genitourinary:     Penis: Normal.       Comments: Ryan cath in place  Musculoskeletal:         General: No tenderness.      Right lower leg: No edema.      Left lower leg: No edema.   Skin:     General: Skin is warm and dry.      Capillary Refill: Capillary refill takes less than 2 seconds.      Findings: No rash.   Neurological:      Mental Status: He is easily aroused.      Comments: Possibly moving L side of body more spontaneously, no ongoing seizure-like activity, opening eyes and looking around but not yet following, withdraws to pain in all 4 extremities   Psychiatric:         Behavior: Behavior is uncooperative.      Comments: Unable to assess given current clinical condition         Fluids  No intake or output data in the 24 hours ending 09/09/20 1327    Laboratory  Recent Results (from the past 48 hour(s))   CBC WITH DIFFERENTIAL    Collection Time: 09/09/20 12:10 PM   Result Value Ref Range    WBC 12.7 (H) 4.8 - 10.8 K/uL    RBC 4.04 (L) 4.70 - 6.10 M/uL    Hemoglobin 14.5 14.0 - 18.0 g/dL    Hematocrit 41.6 (L) 42.0 - 52.0 %    .0 (H) 81.4 - 97.8 fL    MCH 35.9 (H) 27.0 - 33.0 pg    MCHC 34.9 33.7 - 35.3 g/dL    RDW 46.3 35.9 - 50.0 fL    Platelet Count 194 164 - 446 K/uL    MPV 9.8 9.0 - 12.9 fL    Neutrophils-Polys 75.20 (H) 44.00 - 72.00 %    Lymphocytes 12.20 (L) 22.00 - 41.00 %    Monocytes 9.50 0.00 - 13.40 %    Eosinophils 1.80 0.00 - 6.90 %    Basophils 0.40 0.00 - 1.80 %    Immature Granulocytes 0.90  0.00 - 0.90 %    Nucleated RBC 0.00 /100 WBC    Neutrophils (Absolute) 9.53 (H) 1.82 - 7.42 K/uL    Lymphs (Absolute) 1.55 1.00 - 4.80 K/uL    Monos (Absolute) 1.20 (H) 0.00 - 0.85 K/uL    Eos (Absolute) 0.23 0.00 - 0.51 K/uL    Baso (Absolute) 0.05 0.00 - 0.12 K/uL    Immature Granulocytes (abs) 0.12 (H) 0.00 - 0.11 K/uL    NRBC (Absolute) 0.00 K/uL   COMP METABOLIC PANEL    Collection Time: 09/09/20 12:10 PM   Result Value Ref Range    Sodium 129 (L) 135 - 145 mmol/L    Potassium 3.9 3.6 - 5.5 mmol/L    Chloride 95 (L) 96 - 112 mmol/L    Co2 16 (L) 20 - 33 mmol/L    Anion Gap 18.0 (H) 7.0 - 16.0    Glucose 120 (H) 65 - 99 mg/dL    Bun 7 (L) 8 - 22 mg/dL    Creatinine 0.87 0.50 - 1.40 mg/dL    Calcium 8.2 (L) 8.5 - 10.5 mg/dL    AST(SGOT) 38 12 - 45 U/L    ALT(SGPT) 13 2 - 50 U/L    Alkaline Phosphatase 64 30 - 99 U/L    Total Bilirubin 0.4 0.1 - 1.5 mg/dL    Albumin 3.7 3.2 - 4.9 g/dL    Total Protein 6.7 6.0 - 8.2 g/dL    Globulin 3.0 1.9 - 3.5 g/dL    A-G Ratio 1.2 g/dL   TROPONIN    Collection Time: 09/09/20 12:10 PM   Result Value Ref Range    Troponin T 30 (H) 6 - 19 ng/L   APTT    Collection Time: 09/09/20 12:10 PM   Result Value Ref Range    APTT 27.8 24.7 - 36.0 sec   PROTHROMBIN TIME    Collection Time: 09/09/20 12:10 PM   Result Value Ref Range    PT 14.0 12.0 - 14.6 sec    INR 1.04 0.87 - 1.13   LACTIC ACID    Collection Time: 09/09/20 12:10 PM   Result Value Ref Range    Lactic Acid 6.5 (HH) 0.5 - 2.0 mmol/L   Triglycerides Starting now and then Every 3 Days    Collection Time: 09/09/20 12:10 PM   Result Value Ref Range    Triglycerides 136 0 - 149 mg/dL   ESTIMATED GFR    Collection Time: 09/09/20 12:10 PM   Result Value Ref Range    GFR If African American >60 >60 mL/min/1.73 m 2    GFR If Non African American >60 >60 mL/min/1.73 m 2   ISTAT ARTERIAL BLOOD GAS    Collection Time: 09/09/20 12:57 PM   Result Value Ref Range    Ph 7.266 (LL) 7.400 - 7.500    Pco2 39.0 (H) 26.0 - 37.0 mmHg    Po2 126 (H)  64 - 87 mmHg    Tco2 19 (L) 20 - 33 mmol/L    S02 98 93 - 99 %    Hco3 17.7 17.0 - 25.0 mmol/L    BE -9 (L) -4 - 3 mmol/L    Body Temp see below degrees    O2 Therapy 40 %    iPF Ratio 315     Specimen Arterial     Action Range Triggered YES     Inst. Qualified Patient YES        Imaging  DX-CHEST-LIMITED (1 VIEW)   Final Result      1.  No acute cardiopulmonary disease.   2.  Supportive tubing as described above.      CT-CTA HEAD WITH & W/O-POST PROCESS   Final Result      CT angiogram of the [place] within normal limits.      CT-CEREBRAL PERFUSION ANALYSIS   Final Result      1.  Cerebral blood flow less than 30% likely representing completed infarct = 0 mL.      2.  T Max more than 6 seconds likely representing combination of completed infarct and ischemia = 70 mL.      3.  Mismatched volume likely representing ischemic brain/penumbra = 70 mL      4.  Please note that the cerebral perfusion was performed on the limited brain tissue around the basal ganglia region. Infarct/ischemia outside the CT perfusion sections can be missed in this study.      CT-HEAD W/O   Final Result      1.  Limited exam showing no acute intracranial hemorrhage or territorial infarct.   2.  Diffuse atrophy and white matter microvascular ischemic changes.   3.  Motion artifact limits exam.         MR-BRAIN-W/O    (Results Pending)    * Personally reviewed CXR showing enlarged cardiac silhouette, minimal perihilar fullness, sternotomy wires, ETT/FT in good position   * EKG pending    Assessment/Plan  * Acute respiratory failure with hypoxia (HCC)  Assessment & Plan  Intubated in field 9/9 for airway protection  Continue full vent support, increase MV  Titrate FiO2 to keep SaO2 >92%  RT/O2 protocols  Limit all sedatives with hopeful extubation in 12-24 hrs  Propofol and fent gtts prn    Encephalopathy  Assessment & Plan  Likely post-ictal vs CVA  Limit sedatives and monitor  Neuro consult and additional testing as listed  above    Seizure (HCC)  Assessment & Plan  Presumed given witnessed description and elevated lactate  EEG  Keppra  Neuro consult  Seizure precautions  MRI brain?    Lactic acidosis  Assessment & Plan  Likely secondary to seizure, doubt infection  Trend  MIVFs  CXR to eval for PNA    Hyponatremia  Assessment & Plan  Unlikely contributing to AMS/seizure  Monitor with IVF resuscitation    Leukocytosis  Assessment & Plan  Likely reactive, doubt infection  CXR  Monitor      Discussed patient condition and risk of morbidity and/or mortality with Family, RN, RT, Pharmacy, Charge nurse / hot rounds, Patient, neurology and Emergency physician.    The patient remains critically ill.  Critical care time = 42 minutes in directly providing and coordinating critical care and extensive data review.  No time overlap and excludes procedures.

## 2020-09-09 NOTE — PROCEDURES
ROUTINE ELECTROENCEPHALOGRAM REPORT      Referring provider: Dr. Kamryn Underwood    DOS: 9/9/2020 (total recording of 24 minutes)    INDICATION:  Tavo Tannerff 85 y.o. male presenting with suspected seizure    CURRENT ANTIEPILEPTIC REGIMEN: Keppra    TECHNIQUE: 30 channel routine electroencephalogram (EEG) was performed in accordance with the international 10-20 system. The study was reviewed in bipolar and referential montages. The recording examined the patient during wakeful and drowsy state.     DESCRIPTION OF THE RECORD:  The EEG in this intubated and sedated patient shows an 8 Hz activity over the posterior head regions with intermixed 6 to 7 Hz theta.  There is presence of left hemispheric focal slowing with 5 to 6 Hz theta and 2 to 3 Hz delta more prominent over the temporal region.  Near continuous periodic lateralized epileptiform discharges (PLEDs) are seen over the left hemisphere more prominent over the left temporal region with phase reversal at F7.  These have a frequency of approximately 1 to 1.5 Hz but did not evolve into discrete seizure activity.  No organized sleep architecture was seen.  No clinical or subclinical seizures were seen during this tracing.    ACTIVATION PROCEDURES:   Photic stimulation did not produce a driving response.  Hyperventilation was not performed.    EKG: sampling of the EKG recording demonstrated sinus tachycardia    INTERPRETATION:  This is an abnormal awake through drowsy EEG due to mild degree of slowing of the posterior dominant rhythm which is suggestive of a mild encephalopathy of nonspecific etiology.  There is presence of left hemispheric focal slowing as well as periodic lateralized epileptiform discharges (PLEDs) more prominent over the left temporal lobe.  These findings are suggestive of a structural lesion involving the gray and white matter of this hemisphere and may correlate with the patient's history of seizures.  Periodic lateralized epileptiform  discharges correlate with an increased risk of subsequent seizures arising from this area.    Dony Underwood M.D., Diplomat of the American Board of Psychiatry and Neurology  Diplomat of [facility]   Assistant Clinical Professor, [facility] Neurology Consultant

## 2020-09-09 NOTE — RESPIRATORY CARE
Adult Ventilation Update    Events/Summary/Plan: Patient arrived nasally intubated by EMS.  Placed patient on vent and no return volumes noted.  EMS asked if they were getting volumes on transport vent.  At this time, decision made by ERP to extubate and reintubate orally.  7.5 ETT placed, 23 at the gums, positive color change on flow cap, bilateral breath sounds heard.  Placed back on vent with good return volumes. (09/09/20 1216)

## 2020-09-09 NOTE — DISCHARGE PLANNING
Pt brought into ED by [facility].  Crew states Pt is from [place],wife on scene they do not have name.    SW contacted [facility] and spoke to Dispatch.  They stated Pt is :    Tavo Rachel  1935    KAMARI has left messages with Pt wife Izabel for a call back to verify this information.

## 2020-09-09 NOTE — ED NOTES
Med Rec complete per Pt's wife at bedside and Pt's pharmacy  Allergies Reviewed with pt's wife  No ABX filled in the last 14 days    All medications and strengths verified with pt's pharmacy, last doses provided by pt's wife.

## 2020-09-09 NOTE — ASSESSMENT & PLAN NOTE
Extubated on 9/10  Improving oxygen requirement   Requires airway monitoring with risk for re-intubation  Lasix 10 mg IVP x 1 then reassess

## 2020-09-09 NOTE — ED NOTES
"Wife at bedside,  Per wife pt was on porch reading when it looked like his RUE \"went heavy\", then started to have a seizure.   "

## 2020-09-09 NOTE — CONSULTS
"    Note Author: Óscar Conway M.D.       Name Tavo Ramos     1935   Age/Sex 85 y.o. male   MRN [id number]     Consulting physician: Dr. Siddiqui    HPI:  Patient is 85-year-old male with medical history significant for CAD with 3 stents in place, possible stroke with tPA in 2019.  Patient was brought in via care flight after wife found him to have generalized \"shaking\" with both arms and legs.  Just prior to shaking, patient was not responding to questions from wife with blank stare and R. Arm extended and stiff.  Entire episodes symptoms lasted approximately 5-7 minutes.  Wife triggered life support.  Patient was seizing upon EMS arrival.  He received 4 and 5 mg of Versed at 2 separate doses, 9 in total.  He was nasally intubated then transported to [facility].  In ED, patient was orally intubated and started on ketamine. Wife also reports that pt was shaking during episode of stroke in . This time it was lot more severe    Review of Systems   Unable to perform ROS: Intubated             Past Medical History (Chronic medical problem, known complications and current treatment)    Past Medical History:   Diagnosis Date   • Seizure disorder (HCC)    • Stroke (HCC)          Past Surgical History:  History reviewed. No pertinent surgical history.    Current Outpatient Medications:  Home Medications     Reviewed by Brianne Wall (Pharmacy Tech) on 20 at 1322  Med List Status: Not Addressed   Medication Last Dose Status        Patient Gelacio Taking any Medications                       Medication Allergy/Sensitivities:  Not on File      Family History (mandatory)   History reviewed. No pertinent family history.    Social History (mandatory)   Social History     Socioeconomic History   • Marital status:      Spouse name: Not on file   • Number of children: Not on file   • Years of education: Not on file   • Highest education level: Not on file   Occupational History   • Not on " file   Social Needs   • Financial resource strain: Not on file   • Food insecurity     Worry: Not on file     Inability: Not on file   • Transportation needs     Medical: Not on file     Non-medical: Not on file   Tobacco Use   • Smoking status: Not on file   Substance and Sexual Activity   • Alcohol use: Not on file   • Drug use: Not on file   • Sexual activity: Not on file   Lifestyle   • Physical activity     Days per week: Not on file     Minutes per session: Not on file   • Stress: Not on file   Relationships   • Social connections     Talks on phone: Not on file     Gets together: Not on file     Attends [employer] service: Not on file     Active member of club or organization: Not on file     Attends meetings of clubs or organizations: Not on file     Relationship status: Not on file   • Intimate partner violence     Fear of current or ex partner: Not on file     Emotionally abused: Not on file     Physically abused: Not on file     Forced sexual activity: Not on file   Other Topics Concern   • Not on file   Social History Narrative   • Not on file     PCP : Joseluis Castaneda M.D.    Physical Exam     Vitals:    09/09/20 1342 09/09/20 1346 09/09/20 1351 09/09/20 1357   BP: 158/77 (!) 166/77 (!) 171/83 137/73   Pulse: 85 89 85 85   Resp: 15 (!) 33 19 18   Temp:       TempSrc:       SpO2: 100% 100% 100% 100%   Weight:       Height:         Body mass index is 28.89 kg/m².  O2 therapy: Pulse Oximetry: 100 %, O2 Delivery Device: Ventilator    Physical Exam   Constitutional: No distress.   HENT:   Head: Normocephalic and atraumatic.   Mouth/Throat: No oropharyngeal exudate.   Dried nasal blood at R. Nostril    Eyes: Pupils are equal, round, and reactive to light. EOM are normal.   Neck: Normal range of motion. Neck supple.   Cardiovascular: Normal rate, regular rhythm and intact distal pulses.   Pulmonary/Chest: Effort normal and breath sounds normal.   Abdominal: Soft. Bowel sounds are normal. He exhibits no  distension.   Musculoskeletal: Normal range of motion.         General: No edema.   Neurological:   GCS 7T   Skin: Skin is warm and dry. He is not diaphoretic. No erythema.         Data Review   Lab Data Review:  Recent Results (from the past 24 hour(s))   CBC WITH DIFFERENTIAL    Collection Time: 09/09/20 12:10 PM   Result Value Ref Range    WBC 12.7 (H) 4.8 - 10.8 K/uL    RBC 4.04 (L) 4.70 - 6.10 M/uL    Hemoglobin 14.5 14.0 - 18.0 g/dL    Hematocrit 41.6 (L) 42.0 - 52.0 %    .0 (H) 81.4 - 97.8 fL    MCH 35.9 (H) 27.0 - 33.0 pg    MCHC 34.9 33.7 - 35.3 g/dL    RDW 46.3 35.9 - 50.0 fL    Platelet Count 194 164 - 446 K/uL    MPV 9.8 9.0 - 12.9 fL    Neutrophils-Polys 75.20 (H) 44.00 - 72.00 %    Lymphocytes 12.20 (L) 22.00 - 41.00 %    Monocytes 9.50 0.00 - 13.40 %    Eosinophils 1.80 0.00 - 6.90 %    Basophils 0.40 0.00 - 1.80 %    Immature Granulocytes 0.90 0.00 - 0.90 %    Nucleated RBC 0.00 /100 WBC    Neutrophils (Absolute) 9.53 (H) 1.82 - 7.42 K/uL    Lymphs (Absolute) 1.55 1.00 - 4.80 K/uL    Monos (Absolute) 1.20 (H) 0.00 - 0.85 K/uL    Eos (Absolute) 0.23 0.00 - 0.51 K/uL    Baso (Absolute) 0.05 0.00 - 0.12 K/uL    Immature Granulocytes (abs) 0.12 (H) 0.00 - 0.11 K/uL    NRBC (Absolute) 0.00 K/uL   COMP METABOLIC PANEL    Collection Time: 09/09/20 12:10 PM   Result Value Ref Range    Sodium 129 (L) 135 - 145 mmol/L    Potassium 3.9 3.6 - 5.5 mmol/L    Chloride 95 (L) 96 - 112 mmol/L    Co2 16 (L) 20 - 33 mmol/L    Anion Gap 18.0 (H) 7.0 - 16.0    Glucose 120 (H) 65 - 99 mg/dL    Bun 7 (L) 8 - 22 mg/dL    Creatinine 0.87 0.50 - 1.40 mg/dL    Calcium 8.2 (L) 8.5 - 10.5 mg/dL    AST(SGOT) 38 12 - 45 U/L    ALT(SGPT) 13 2 - 50 U/L    Alkaline Phosphatase 64 30 - 99 U/L    Total Bilirubin 0.4 0.1 - 1.5 mg/dL    Albumin 3.7 3.2 - 4.9 g/dL    Total Protein 6.7 6.0 - 8.2 g/dL    Globulin 3.0 1.9 - 3.5 g/dL    A-G Ratio 1.2 g/dL   TROPONIN    Collection Time: 09/09/20 12:10 PM   Result Value Ref Range     Troponin T 30 (H) 6 - 19 ng/L   APTT    Collection Time: 09/09/20 12:10 PM   Result Value Ref Range    APTT 27.8 24.7 - 36.0 sec   PROTHROMBIN TIME    Collection Time: 09/09/20 12:10 PM   Result Value Ref Range    PT 14.0 12.0 - 14.6 sec    INR 1.04 0.87 - 1.13   LACTIC ACID    Collection Time: 09/09/20 12:10 PM   Result Value Ref Range    Lactic Acid 6.5 (HH) 0.5 - 2.0 mmol/L   Triglycerides Starting now and then Every 3 Days    Collection Time: 09/09/20 12:10 PM   Result Value Ref Range    Triglycerides 136 0 - 149 mg/dL   ESTIMATED GFR    Collection Time: 09/09/20 12:10 PM   Result Value Ref Range    GFR If African American >60 >60 mL/min/1.73 m 2    GFR If Non African American >60 >60 mL/min/1.73 m 2   ISTAT ARTERIAL BLOOD GAS    Collection Time: 09/09/20 12:57 PM   Result Value Ref Range    Ph 7.266 (LL) 7.400 - 7.500    Pco2 39.0 (H) 26.0 - 37.0 mmHg    Po2 126 (H) 64 - 87 mmHg    Tco2 19 (L) 20 - 33 mmol/L    S02 98 93 - 99 %    Hco3 17.7 17.0 - 25.0 mmol/L    BE -9 (L) -4 - 3 mmol/L    Body Temp see below degrees    O2 Therapy 40 %    iPF Ratio 315     Specimen Arterial     Action Range Triggered YES     Inst. Qualified Patient YES    Urinalysis    Collection Time: 09/09/20  1:24 PM    Specimen: Urine   Result Value Ref Range    Color Yellow     Character Clear     Ph 6.0 5.0 - 8.0    Glucose Negative Negative mg/dL    Ketones Trace (A) Negative mg/dL    Protein 100 (A) Negative mg/dL    Bilirubin Negative Negative    Urobilinogen, Urine 0.2 Negative    Nitrite Negative Negative    Leukocyte Esterase Negative Negative    Occult Blood Trace (A) Negative    Micro Urine Req Microscopic    REFRACTOMETER SG    Collection Time: 09/09/20  1:24 PM   Result Value Ref Range    Specific Gravity 1.011    URINE MICROSCOPIC (W/UA)    Collection Time: 09/09/20  1:24 PM   Result Value Ref Range    WBC 0-2 (A) /hpf    RBC 2-5 (A) /hpf    Bacteria Negative None /hpf    Epithelial Cells Negative /hpf   Routine (COVID/SARS  COV-2 In-House PCR up to 24 hours)    Collection Time: 09/09/20  1:40 PM    Specimen: Nasopharyngeal; Respirate   Result Value Ref Range    COVID Order Status Received    SARS-CoV-2, PCR (In-House)    Collection Time: 09/09/20  1:40 PM   Result Value Ref Range    SARS-CoV-2 Source NP Swab    Lactic Acid -STAT Once    Collection Time: 09/09/20  1:50 PM   Result Value Ref Range    Lactic Acid 2.9 (H) 0.5 - 2.0 mmol/L   Prothrombin time (INR)    Collection Time: 09/09/20  1:50 PM   Result Value Ref Range    PT 14.3 12.0 - 14.6 sec    INR 1.08 0.87 - 1.13       Imaging/Procedures Review:    Independant Imaging Review: Completed  CT-CTA HEAD WITH & W/O-POST PROCESS   Final Result      CT angiogram of the [place] within normal limits.      CT-CEREBRAL PERFUSION ANALYSIS   Final Result      1.  Cerebral blood flow less than 30% likely representing completed infarct = 0 mL.      2.  T Max more than 6 seconds likely representing combination of completed infarct and ischemia = 70 mL.      3.  Mismatched volume likely representing ischemic brain/penumbra = 70 mL      4.  Please note that the cerebral perfusion was performed on the limited brain tissue around the basal ganglia region. Infarct/ischemia outside the CT perfusion sections can be missed in this study.      CT-HEAD W/O   Final Result      1.  Limited exam showing no acute intracranial hemorrhage or territorial infarct.   2.  Diffuse atrophy and white matter microvascular ischemic changes.   3.  Motion artifact limits exam.         MR-BRAIN-W/O    (Results Pending)   DX-CHEST-LIMITED (1 VIEW)    (Results Pending)        No results found for this or any previous visit.         Assessment/Plan     #Seizure    Patient is 86 YO male with hx of stroke s/p tPA in 2019 presented with body shaking lasting 5-7 mins. EEG in ED along with history from wife consistent with seizures.     Recommendations:  -Keppra load with 2 gram then 500mg BID  -Video EEG

## 2020-09-09 NOTE — ED NOTES
Late entry-    Pt arrived @ 1150. Arrives s/p seizure.   Nasally intubated in field due to snoring resps and right sided gaze per EMS,  Post-sz. Pupils equal and reactive prior to medications and moves upper and lower extremities.    Pt medicated per MAR for reintubation.  1205- pt intubated by Dr Siddiqui,  Color change noted, equal chest rise noted.  NGT placed.  Ryan catheter.   Pt then to CT on monitor.

## 2020-09-09 NOTE — ASSESSMENT & PLAN NOTE
EEG  K[place]  Neuro consult  Seizure precautions  MRI brain with:  1.  Diffuse severe atrophy.  2.  Probable periventricular white matter microvascular ischemic changes.  3.  No acute stroke.  4.  Probable sequela of prior punctate hemorrhage in the LEFT occipital region.  No findings to indicate acute hemorrhage.

## 2020-09-09 NOTE — ED PROVIDER NOTES
"ED Provider Note    CHIEF COMPLAINT  Chief Complaint   Patient presents with   • Seizure     hx of,  intubated in route   • Possible Stroke     per EMS possible (R) gaze       HPI  Tavo Ramos is a 85 y.o. male who presents to the emergency department by helicopter from home following reported seizure.  Per EMS, ground EMS arrived after a call for seizure.  Patient was witnessed with tonic-clonic seizure, given 5 mg of Versed with apparent termination the patient had a reported right-sided gaze and was nonverbal.  Concern for airway maintenance, possible stroke therefore he was nasally intubated by ground EMS before transferred to care flight.  Additional 4 mg of Versed for agitation in route.    Apparently patient had seizure previously with stroke.  No current seizure medication.  No known deficits from his stroke.    HPI is limited due to patient altered mental status, wife is not available for questioning initially.    REVIEW OF SYSTEMS  See HPI for further details.  Limited due to above.    PAST MEDICAL HISTORY   has a past medical history of Seizure disorder (HCC) and Stroke (HCC).    SOCIAL HISTORY  Social History     Tobacco Use   • Smoking status: Not on file   Substance and Sexual Activity   • Alcohol use: Not on file   • Drug use: Not on file   • Sexual activity: Not on file   Unknown    SURGICAL HISTORY  patient denies any surgical history    CURRENT MEDICATIONS  Home Medications     Reviewed by Brianne Wall (Pharmacy Tech) on 09/09/20 at 1322  Med List Status: Not Addressed   Medication Last Dose Status        Patient Gelacio Taking any Medications                   Unknown    ALLERGIES  Not on File   Unknown    PHYSICAL EXAM  VITAL SIGNS: /60   Pulse 83   Temp 36.8 °C (98.3 °F) (Temporal)   Resp 20   Ht 1.727 m (5' 8\")   Wt 86.2 kg (190 lb)   SpO2 100%   BMI 28.89 kg/m²   Pulse ox interpretation: I interpret this pulse ox as normal with mechanical " ventilation  Constitutional: Intubated, somewhat sedated  HENT: Normocephalic, atraumatic. Bilateral external ears normal, Nose normal. Moist mucous membranes.    Eyes: Pupils are equal and reactive, 3-2 bilaterally, brisk.  Conjunctiva normal.   Neck: Normal range of motion  Lymphatic: No lymphadenopathy noted.    Cardiovascular: Mild tachycardia otherwise regular rate and rhythm, no murmurs. Distal pulses intact.    Thorax & Lungs: Breath sounds equal but diminished bilaterally with mechanical ventilation..  No wheezing/rales/ronchi.    Abdomen: Soft, non-distended.  No palpable or pulsatile mass.  Skin: Warm, Dry, No erythema, No rash.   Musculoskeletal: No major deformities noted.   Neurologic: Intubated, somewhat sedated.  Equal eye closure, clenching.  Biting tube.  Moves upper extremities spontaneously and grabs for ET tube.  Psychiatric: Unable to assess      DIAGNOSTIC STUDIES / PROCEDURES  LABS  Results for orders placed or performed during the hospital encounter of 09/09/20   CBC WITH DIFFERENTIAL   Result Value Ref Range    WBC 12.7 (H) 4.8 - 10.8 K/uL    RBC 4.04 (L) 4.70 - 6.10 M/uL    Hemoglobin 14.5 14.0 - 18.0 g/dL    Hematocrit 41.6 (L) 42.0 - 52.0 %    .0 (H) 81.4 - 97.8 fL    MCH 35.9 (H) 27.0 - 33.0 pg    MCHC 34.9 33.7 - 35.3 g/dL    RDW 46.3 35.9 - 50.0 fL    Platelet Count 194 164 - 446 K/uL    MPV 9.8 9.0 - 12.9 fL    Neutrophils-Polys 75.20 (H) 44.00 - 72.00 %    Lymphocytes 12.20 (L) 22.00 - 41.00 %    Monocytes 9.50 0.00 - 13.40 %    Eosinophils 1.80 0.00 - 6.90 %    Basophils 0.40 0.00 - 1.80 %    Immature Granulocytes 0.90 0.00 - 0.90 %    Nucleated RBC 0.00 /100 WBC    Neutrophils (Absolute) 9.53 (H) 1.82 - 7.42 K/uL    Lymphs (Absolute) 1.55 1.00 - 4.80 K/uL    Monos (Absolute) 1.20 (H) 0.00 - 0.85 K/uL    Eos (Absolute) 0.23 0.00 - 0.51 K/uL    Baso (Absolute) 0.05 0.00 - 0.12 K/uL    Immature Granulocytes (abs) 0.12 (H) 0.00 - 0.11 K/uL    NRBC (Absolute) 0.00 K/uL   COMP  METABOLIC PANEL   Result Value Ref Range    Sodium 129 (L) 135 - 145 mmol/L    Potassium 3.9 3.6 - 5.5 mmol/L    Chloride 95 (L) 96 - 112 mmol/L    Co2 16 (L) 20 - 33 mmol/L    Anion Gap 18.0 (H) 7.0 - 16.0    Glucose 120 (H) 65 - 99 mg/dL    Bun 7 (L) 8 - 22 mg/dL    Creatinine 0.87 0.50 - 1.40 mg/dL    Calcium 8.2 (L) 8.5 - 10.5 mg/dL    AST(SGOT) 38 12 - 45 U/L    ALT(SGPT) 13 2 - 50 U/L    Alkaline Phosphatase 64 30 - 99 U/L    Total Bilirubin 0.4 0.1 - 1.5 mg/dL    Albumin 3.7 3.2 - 4.9 g/dL    Total Protein 6.7 6.0 - 8.2 g/dL    Globulin 3.0 1.9 - 3.5 g/dL    A-G Ratio 1.2 g/dL   TROPONIN   Result Value Ref Range    Troponin T 30 (H) 6 - 19 ng/L   APTT   Result Value Ref Range    APTT 27.8 24.7 - 36.0 sec   PROTHROMBIN TIME   Result Value Ref Range    PT 14.0 12.0 - 14.6 sec    INR 1.04 0.87 - 1.13   LACTIC ACID   Result Value Ref Range    Lactic Acid 6.5 (HH) 0.5 - 2.0 mmol/L   Triglycerides Starting now and then Every 3 Days   Result Value Ref Range    Triglycerides 136 0 - 149 mg/dL   ESTIMATED GFR   Result Value Ref Range    GFR If African American >60 >60 mL/min/1.73 m 2    GFR If Non African American >60 >60 mL/min/1.73 m 2   Urinalysis    Specimen: Urine   Result Value Ref Range    Color Yellow     Character Clear     Ph 6.0 5.0 - 8.0    Glucose Negative Negative mg/dL    Ketones Trace (A) Negative mg/dL    Protein 100 (A) Negative mg/dL    Bilirubin Negative Negative    Urobilinogen, Urine 0.2 Negative    Nitrite Negative Negative    Leukocyte Esterase Negative Negative    Occult Blood Trace (A) Negative    Micro Urine Req Microscopic    REFRACTOMETER SG   Result Value Ref Range    Specific Gravity 1.011    URINE MICROSCOPIC (W/UA)   Result Value Ref Range    WBC 0-2 (A) /hpf    RBC 2-5 (A) /hpf    Bacteria Negative None /hpf    Epithelial Cells Negative /hpf   ISTAT ARTERIAL BLOOD GAS   Result Value Ref Range    Ph 7.266 (LL) 7.400 - 7.500    Pco2 39.0 (H) 26.0 - 37.0 mmHg    Po2 126 (H) 64 - 87 mmHg     Tco2 19 (L) 20 - 33 mmol/L    S02 98 93 - 99 %    Hco3 17.7 17.0 - 25.0 mmol/L    BE -9 (L) -4 - 3 mmol/L    Body Temp see below degrees    O2 Therapy 40 %    iPF Ratio 315     Specimen Arterial     Action Range Triggered YES     Inst. Qualified Patient YES        RADIOLOGY  CT-CTA HEAD WITH & W/O-POST PROCESS   Final Result      CT angiogram of the [place] within normal limits.      CT-CEREBRAL PERFUSION ANALYSIS   Final Result      1.  Cerebral blood flow less than 30% likely representing completed infarct = 0 mL.      2.  T Max more than 6 seconds likely representing combination of completed infarct and ischemia = 70 mL.      3.  Mismatched volume likely representing ischemic brain/penumbra = 70 mL      4.  Please note that the cerebral perfusion was performed on the limited brain tissue around the basal ganglia region. Infarct/ischemia outside the CT perfusion sections can be missed in this study.      CT-HEAD W/O   Final Result      1.  Limited exam showing no acute intracranial hemorrhage or territorial infarct.   2.  Diffuse atrophy and white matter microvascular ischemic changes.   3.  Motion artifact limits exam.         MR-BRAIN-W/O    (Results Pending)       PROCEDURE  EMERGENT INTUBATION PROCEDURE NOTE  INDICATION: Altered mental status, seizure  TECHNIQUE: Direct laryngoscopy  After pre-oxygenating the patient for greater than 10 minutes, a modified rapid-sequence induction was performed using ketamine with cricoid pressure. Using a size 3 {BLADE SIZE  blade, a grade 2 view was obtained, and a 7.5 endotracheal tube was placed and secured.  Placement was confirmed with auscultation and end-tidal CO2.  COMPLICATIONS: None. The patient tolerated the procedure well with no complications.      COURSE & MEDICAL DECISION MAKING  Patient seen and evaluated immediately upon arrival and read 12.  Arrives intubated, partially sedated with Versed in route.  History as above.  Patient's first name, age  is known, no other information therefore he is registered under a pseudo-name.    Nasal intubation prior to arrival, poor tidal volumes therefore it was decided the patient would be reintubated with oral endotracheal tube.  This was done as described above without complication with direct laryngoscopy.  Propofol for sedation thereafter.  Labs, stat head CT ordered.    CT head, CTA, perfusion are unrevealing.    Lactic acidosis, likely secondary to the reported seizure.  Respiratory acidosis, may be due to poor ventilation initially, vent settings were adjusted.  Labs are quite unremarkable otherwise.    1313 -Dr. Nguyen, intensivist, is aware of the patient agreeable to admission.  Agrees with MRI now, neurology consultation as well.    1315 -Dr. Underwood, neurology, is aware of the patient agreeable to consultation.    1320 -wife now at bedside.  Provides additional history that patient was moving his lips, appearing only trying to speak but unable to produce words and then she describes a flaccid right arm just before seizure.  She was concerned he was having another stroke.  Previous stroke either April 2020 or April 2019.  All records are available at this facility, his information has been updated and charts can be merged.    ED evaluation for altered mental status, reported seizure is suggestive of that.  Unknown if altered mentation found by EMS was postictal episode alone.  Imaging is unrevealing at this time, however awaiting MRI for definitive evaluation of stroke.  Given inconsistent presenting history and seizure at onset, patient is not a TPA candidate.  Hemodynamically stable in the emergency department.  Keppra for history of seizure.    Patient will be hospitalized for further evaluation and treatment.  EEG at bedside now.    The total critical care time on this patient is 40 minutes, immediate and continuous hemodynamic monitoring and multiple bedside evaluations, resuscitating patient and assessing  response to treatment, deciphering test results, speaking with admitting and consulting physician, and arranging for ICU hospital admission. This 40 minutes is exclusive of separately billable procedures.      FINAL IMPRESSION  (R56.9) Seizure (HCC)  (R41.82) Altered mental status, unspecified altered mental status type  (Z86.73) History of stroke      Electronically signed by: Su Siddiqui D.O., 9/9/2020 1:46 PM      This dictation was created using voice recognition software. The accuracy of the dictation is limited to the abilities of the software. I expect there may be some errors of grammar and possibly content. The nursing notes were reviewed and certain aspects of this information were incorporated into this note.

## 2020-09-09 NOTE — CARE PLAN
Problem: Ventilation Defect:  Goal: Ability to achieve and maintain unassisted ventilation or tolerate decreased levels of ventilator support  Outcome: PROGRESSING AS EXPECTED      Ventilator Daily Summary    Vent Day #1    Ventilator settings changed this shift: none    Weaning trials: none     Respiratory Procedures: none     Plan: Continue current ventilator settings and wean mechanical ventilation as tolerated per physician orders.

## 2020-09-10 ENCOUNTER — APPOINTMENT (OUTPATIENT)
Dept: RADIOLOGY | Facility: MEDICAL CENTER | Age: 85
DRG: 100 | End: 2020-09-10
Attending: INTERNAL MEDICINE
Payer: MEDICARE

## 2020-09-10 PROBLEM — E87.20 LACTIC ACIDOSIS: Status: RESOLVED | Noted: 2020-09-09 | Resolved: 2020-09-10

## 2020-09-10 PROBLEM — E87.1 HYPONATREMIA: Status: RESOLVED | Noted: 2020-09-09 | Resolved: 2020-09-10

## 2020-09-10 PROBLEM — D72.829 LEUKOCYTOSIS: Status: RESOLVED | Noted: 2020-09-09 | Resolved: 2020-09-10

## 2020-09-10 LAB
ACTION RANGE TRIGGERED IACRT: NO
ANION GAP SERPL CALC-SCNC: 17 MMOL/L (ref 7–16)
BASE EXCESS BLDA CALC-SCNC: -5 MMOL/L (ref -4–3)
BASOPHILS # BLD AUTO: 0.3 % (ref 0–1.8)
BASOPHILS # BLD: 0.03 K/UL (ref 0–0.12)
BODY TEMPERATURE: ABNORMAL DEGREES
BUN SERPL-MCNC: 8 MG/DL (ref 8–22)
CALCIUM SERPL-MCNC: 8.4 MG/DL (ref 8.5–10.5)
CHLORIDE SERPL-SCNC: 97 MMOL/L (ref 96–112)
CO2 BLDA-SCNC: 17 MMOL/L (ref 20–33)
CO2 SERPL-SCNC: 17 MMOL/L (ref 20–33)
CREAT SERPL-MCNC: 0.75 MG/DL (ref 0.5–1.4)
EKG IMPRESSION: NORMAL
EOSINOPHIL # BLD AUTO: 0.07 K/UL (ref 0–0.51)
EOSINOPHIL NFR BLD: 0.7 % (ref 0–6.9)
ERYTHROCYTE [DISTWIDTH] IN BLOOD BY AUTOMATED COUNT: 46.8 FL (ref 35.9–50)
GLUCOSE BLD-MCNC: 101 MG/DL (ref 65–99)
GLUCOSE BLD-MCNC: 125 MG/DL (ref 65–99)
GLUCOSE BLD-MCNC: 132 MG/DL (ref 65–99)
GLUCOSE BLD-MCNC: 169 MG/DL (ref 65–99)
GLUCOSE BLD-MCNC: 58 MG/DL (ref 65–99)
GLUCOSE BLD-MCNC: 81 MG/DL (ref 65–99)
GLUCOSE SERPL-MCNC: 100 MG/DL (ref 65–99)
HCO3 BLDA-SCNC: 16.2 MMOL/L (ref 17–25)
HCT VFR BLD AUTO: 37.8 % (ref 42–52)
HGB BLD-MCNC: 13.3 G/DL (ref 14–18)
HOROWITZ INDEX BLDA+IHG-RTO: 320 MM[HG]
IMM GRANULOCYTES # BLD AUTO: 0.06 K/UL (ref 0–0.11)
IMM GRANULOCYTES NFR BLD AUTO: 0.6 % (ref 0–0.9)
INST. QUALIFIED PATIENT IIQPT: YES
LACTATE BLD-SCNC: 2.5 MMOL/L (ref 0.5–2)
LYMPHOCYTES # BLD AUTO: 1.56 K/UL (ref 1–4.8)
LYMPHOCYTES NFR BLD: 14.8 % (ref 22–41)
MAGNESIUM SERPL-MCNC: 1.9 MG/DL (ref 1.5–2.5)
MCH RBC QN AUTO: 35.8 PG (ref 27–33)
MCHC RBC AUTO-ENTMCNC: 35.2 G/DL (ref 33.7–35.3)
MCV RBC AUTO: 101.9 FL (ref 81.4–97.8)
MONOCYTES # BLD AUTO: 1.66 K/UL (ref 0–0.85)
MONOCYTES NFR BLD AUTO: 15.8 % (ref 0–13.4)
NEUTROPHILS # BLD AUTO: 7.14 K/UL (ref 1.82–7.42)
NEUTROPHILS NFR BLD: 67.8 % (ref 44–72)
NRBC # BLD AUTO: 0 K/UL
NRBC BLD-RTO: 0 /100 WBC
O2/TOTAL GAS SETTING VFR VENT: 40 %
PCO2 BLDA: 21.8 MMHG (ref 26–37)
PCO2 TEMP ADJ BLDA: 22 MMHG (ref 26–37)
PH BLDA: 7.48 [PH] (ref 7.4–7.5)
PH TEMP ADJ BLDA: 7.48 [PH] (ref 7.4–7.5)
PHOSPHATE SERPL-MCNC: 3.3 MG/DL (ref 2.5–4.5)
PLATELET # BLD AUTO: 159 K/UL (ref 164–446)
PMV BLD AUTO: 10 FL (ref 9–12.9)
PO2 BLDA: 128 MMHG (ref 64–87)
PO2 TEMP ADJ BLDA: 130 MMHG (ref 64–87)
POTASSIUM SERPL-SCNC: 4.3 MMOL/L (ref 3.6–5.5)
RBC # BLD AUTO: 3.71 M/UL (ref 4.7–6.1)
SAO2 % BLDA: 99 % (ref 93–99)
SODIUM SERPL-SCNC: 131 MMOL/L (ref 135–145)
SPECIMEN DRAWN FROM PATIENT: ABNORMAL
TROPONIN T SERPL-MCNC: 62 NG/L (ref 6–19)
WBC # BLD AUTO: 10.5 K/UL (ref 4.8–10.8)

## 2020-09-10 PROCEDURE — 700111 HCHG RX REV CODE 636 W/ 250 OVERRIDE (IP): Performed by: INTERNAL MEDICINE

## 2020-09-10 PROCEDURE — 83735 ASSAY OF MAGNESIUM: CPT

## 2020-09-10 PROCEDURE — 80048 BASIC METABOLIC PNL TOTAL CA: CPT

## 2020-09-10 PROCEDURE — 71045 X-RAY EXAM CHEST 1 VIEW: CPT

## 2020-09-10 PROCEDURE — 82962 GLUCOSE BLOOD TEST: CPT | Mod: 91

## 2020-09-10 PROCEDURE — 83605 ASSAY OF LACTIC ACID: CPT

## 2020-09-10 PROCEDURE — 700105 HCHG RX REV CODE 258: Performed by: INTERNAL MEDICINE

## 2020-09-10 PROCEDURE — 84100 ASSAY OF PHOSPHORUS: CPT

## 2020-09-10 PROCEDURE — A9270 NON-COVERED ITEM OR SERVICE: HCPCS | Performed by: INTERNAL MEDICINE

## 2020-09-10 PROCEDURE — 99291 CRITICAL CARE FIRST HOUR: CPT | Performed by: INTERNAL MEDICINE

## 2020-09-10 PROCEDURE — 94770 HCHG CO2 EXPIRED GAS DETERMINATION: CPT

## 2020-09-10 PROCEDURE — 700101 HCHG RX REV CODE 250: Performed by: INTERNAL MEDICINE

## 2020-09-10 PROCEDURE — 85025 COMPLETE CBC W/AUTO DIFF WBC: CPT

## 2020-09-10 PROCEDURE — 99233 SBSQ HOSP IP/OBS HIGH 50: CPT | Performed by: NURSE PRACTITIONER

## 2020-09-10 PROCEDURE — 93010 ELECTROCARDIOGRAM REPORT: CPT | Performed by: INTERNAL MEDICINE

## 2020-09-10 PROCEDURE — 94003 VENT MGMT INPAT SUBQ DAY: CPT

## 2020-09-10 PROCEDURE — 700102 HCHG RX REV CODE 250 W/ 637 OVERRIDE(OP): Performed by: INTERNAL MEDICINE

## 2020-09-10 PROCEDURE — 84484 ASSAY OF TROPONIN QUANT: CPT

## 2020-09-10 PROCEDURE — 770022 HCHG ROOM/CARE - ICU (200)

## 2020-09-10 PROCEDURE — 82803 BLOOD GASES ANY COMBINATION: CPT

## 2020-09-10 PROCEDURE — 36600 WITHDRAWAL OF ARTERIAL BLOOD: CPT

## 2020-09-10 PROCEDURE — 70551 MRI BRAIN STEM W/O DYE: CPT

## 2020-09-10 RX ORDER — PHENYLEPHRINE HYDROCHLORIDE 10 MG/ML
INJECTION, SOLUTION INTRAMUSCULAR; INTRAVENOUS; SUBCUTANEOUS
Status: DISCONTINUED
Start: 2020-09-10 | End: 2020-09-10

## 2020-09-10 RX ORDER — LEVETIRACETAM 500 MG/1
500 TABLET ORAL 2 TIMES DAILY
Status: DISCONTINUED | OUTPATIENT
Start: 2020-09-10 | End: 2020-09-14 | Stop reason: HOSPADM

## 2020-09-10 RX ORDER — FUROSEMIDE 10 MG/ML
10 INJECTION INTRAMUSCULAR; INTRAVENOUS ONCE
Status: COMPLETED | OUTPATIENT
Start: 2020-09-10 | End: 2020-09-10

## 2020-09-10 RX ORDER — PHENYLEPHRINE HCL IN 0.9% NACL 0.5 MG/5ML
SYRINGE (ML) INTRAVENOUS
Status: DISCONTINUED
Start: 2020-09-10 | End: 2020-09-10

## 2020-09-10 RX ORDER — DEXTROSE, SODIUM CHLORIDE, SODIUM LACTATE, POTASSIUM CHLORIDE, AND CALCIUM CHLORIDE 5; .6; .31; .03; .02 G/100ML; G/100ML; G/100ML; G/100ML; G/100ML
INJECTION, SOLUTION INTRAVENOUS CONTINUOUS
Status: DISCONTINUED | OUTPATIENT
Start: 2020-09-10 | End: 2020-09-10

## 2020-09-10 RX ORDER — DEXMEDETOMIDINE HYDROCHLORIDE 4 UG/ML
.1-1.5 INJECTION, SOLUTION INTRAVENOUS CONTINUOUS
Status: DISCONTINUED | OUTPATIENT
Start: 2020-09-10 | End: 2020-09-10

## 2020-09-10 RX ORDER — LEVETIRACETAM 5 MG/ML
500 INJECTION INTRAVASCULAR ONCE
Status: COMPLETED | OUTPATIENT
Start: 2020-09-10 | End: 2020-09-10

## 2020-09-10 RX ORDER — SIMVASTATIN 20 MG
20 TABLET ORAL EVERY EVENING
Status: DISCONTINUED | OUTPATIENT
Start: 2020-09-10 | End: 2020-09-14 | Stop reason: HOSPADM

## 2020-09-10 RX ADMIN — ENOXAPARIN SODIUM 40 MG: 40 INJECTION SUBCUTANEOUS at 05:00

## 2020-09-10 RX ADMIN — FENTANYL CITRATE 200 MCG/HR: 50 INJECTION, SOLUTION INTRAMUSCULAR; INTRAVENOUS at 06:59

## 2020-09-10 RX ADMIN — ENALAPRILAT 1.25 MG: 1.25 INJECTION INTRAVENOUS at 11:52

## 2020-09-10 RX ADMIN — DEXTROSE MONOHYDRATE 50 ML: 25 INJECTION, SOLUTION INTRAVENOUS at 05:05

## 2020-09-10 RX ADMIN — FUROSEMIDE 10 MG: 10 INJECTION, SOLUTION INTRAMUSCULAR; INTRAVENOUS at 15:25

## 2020-09-10 RX ADMIN — LEVETIRACETAM INJECTION 500 MG: 5 INJECTION INTRAVENOUS at 19:24

## 2020-09-10 RX ADMIN — ONDANSETRON 4 MG: 2 INJECTION INTRAMUSCULAR; INTRAVENOUS at 16:56

## 2020-09-10 RX ADMIN — FENTANYL CITRATE 200 MCG: 50 INJECTION INTRAMUSCULAR; INTRAVENOUS at 01:10

## 2020-09-10 RX ADMIN — PROPOFOL 30 MCG/KG/MIN: 10 INJECTION, EMULSION INTRAVENOUS at 01:00

## 2020-09-10 RX ADMIN — FAMOTIDINE 20 MG: 10 INJECTION INTRAVENOUS at 04:59

## 2020-09-10 RX ADMIN — ENALAPRILAT 1.25 MG: 1.25 INJECTION INTRAVENOUS at 21:35

## 2020-09-10 RX ADMIN — SODIUM CHLORIDE, POTASSIUM CHLORIDE, SODIUM LACTATE AND CALCIUM CHLORIDE: 600; 310; 30; 20 INJECTION, SOLUTION INTRAVENOUS at 02:39

## 2020-09-10 RX ADMIN — LEVETIRACETAM INJECTION 500 MG: 5 INJECTION INTRAVENOUS at 05:00

## 2020-09-10 RX ADMIN — LABETALOL HYDROCHLORIDE 10 MG: 5 INJECTION, SOLUTION INTRAVENOUS at 23:38

## 2020-09-10 RX ADMIN — METOPROLOL TARTRATE 25 MG: 25 TABLET, FILM COATED ORAL at 15:25

## 2020-09-10 RX ADMIN — SODIUM CHLORIDE, SODIUM LACTATE, POTASSIUM CHLORIDE, CALCIUM CHLORIDE AND DEXTROSE MONOHYDRATE: 5; 600; 310; 30; 20 INJECTION, SOLUTION INTRAVENOUS at 05:48

## 2020-09-10 ASSESSMENT — PAIN DESCRIPTION - PAIN TYPE
TYPE: ACUTE PAIN

## 2020-09-10 ASSESSMENT — FIBROSIS 4 INDEX: FIB4 SCORE: 5.5

## 2020-09-10 NOTE — CARE PLAN
Problem: Nutritional:  Goal: Nutrition support tolerated and meeting greater than 85% of estimated needs  Outcome: NOT MET   Start trickle feeds of Impact 1.5 @ 10 mL/hr. MD to manage TF advancement. Suggested TF goal rate of 50 mL/hr. See RD note.    RD following.

## 2020-09-10 NOTE — DIETARY
"Nutrition Support Assessment:  Day 1 of admit.  Tavo Ramos is a 85 y.o. male with admitting DX of altered mental status.      Current problem list:  1. Acute respiratory failure with hypoxia  2. Seizure  3. Encephalopathy  4. Lactic acidosis  5. Leukocytosis  6. Hyponatremia     Assessment:  Estimated Nutritional Needs based on:   Height: 172.7 cm (5' 7.99\")  Weight: 93.4 kg (205 lb 14.6 oz) via bed scale 9/10.  Weight to Use in Calculations: 89.4 kg (197 lb 1.5 oz) via bed scale 9/9. Pt I/O + 2.2 L, will use admit wt for assessment as suspect closest to dry wt.   Body mass index is 29.97 kg/m²., BMI classification: overweight    Calculation/Equation: MSJ x 1-1.1 = 0749-8782 kcal/day;   PSU (Ve 9.3, Tmax 37.7) = 1873 kcal/day  Calories: 9031-8716 kcal/day (17-20 kcal/kg admit wt)  Grams protein: 107-134 grams/day (1.2-1.5 g/kg admit wt)     Evaluation:   1. Pt currently intubated- nutrition support likely appropriate despite possible extubation today as pt with possible stroke, will require SLP eval upon extubation.   2. Enteral access via OG tube- okay to use for TF.  3. Glu  over past 24 hr; low scale SSI dose not req, hypoglycemia protocol of D5LR @ 83 mL/hr initiated, now stopped  4. Labs: Na 131 (L)  5. MAR: ICU electrolyte replacement per pharmacy   6. Last bowel movement 1 day PTA per pt's wife. Skin intact per documentation.   7. Pt on brink of obesity (admit BMI 29.97)- will feed close to REE per RD discretion. High protein formula appropriate to meet patient's nutritional needs at this time.      Malnutrition Risk: NA     Recommendations/Plan:  1. Start trickle feeds of Impact 1.5 @ 10 mL/hr. MD to manage TF advancement. Suggested TF goal rate of 50 mL/hr to provide 1800 kcal (20 kcal/kg), 112.8 g protein, and 924 mL free water per day.   2. Fluids per MD.  3. Advance to diet as safe per SLP.    RD following.                     "

## 2020-09-10 NOTE — PROGRESS NOTES
Pt transported to MRI with cardiac monitoring and MRI pump. MRI pump gtts verified with Monika BENITEZ RN.

## 2020-09-10 NOTE — CONSULTS
Critical Care Consultation    Date of consult: 9/9/2020    Referring Physician  Su Siddiqui D.O.    Reason for Consultation  Acute respiratory failure, seizure    History of Presenting Illness  86 yo male who presented 9/9/2020 with seizures and respiratory failure. Patient was in his usual state of health until this morning when he was sitting in a chair and started to have sudden onset RUE weakness and inability to speak. He subsequently developed a witnessed (by wife) generalized shaking episode concerning for seizure lasting a 5-15 min until EMS arrived and administered versed. Post-versed, patient's shaking stopped but then started to snore and desaturate prompting nasal intubation by the FD. [facility] was dispatched and transported patient on a vent. Required additional versed en route for agitation. In ED underwent CT imaging and neuro was consulted. Starting to awaken on my exam without ongoing seizure-like activity.    9/10 awake and following commands, equal strength in bilateral arms    Code Status  Full Code    Review of Systems  Review of Systems   Unable to perform ROS: Mental acuity     Past Medical History   has a past medical history of CAD (coronary artery disease), Seizure disorder (HCC), and Stroke (HCC). - CAD, HTN, HLD    Surgical History   has no past surgical history on file. - CABG x 4, cardiac stents, cholecystectomy    Family History  family history is not on file. - no CVA nor seizure    Social History   Neg alcohol, tobacco, drugs.  and lives in [place], [place]    Medications  Home Medications     Reviewed by Brianne Wall (Pharmacy Tech) on 09/09/20 at 1605  Med List Status: Complete   Medication Last Dose Status   finasteride (PROSCAR) 5 MG Tab 9/9/2020 Active   furosemide (LASIX) 20 MG Tab 9/9/2020 Active   metoprolol SR (TOPROL XL) 100 MG TABLET SR 24 HR 9/9/2020 Active   simvastatin (ZOCOR) 20 MG Tab 9/8/2020 Active             * see RN notes    Current  Facility-Administered Medications   Medication Dose Route Frequency Provider Last Rate Last Dose   • Pharmacy Consult: Enteral tube insertion - review meds/change route/product selection  1 Each Other PHARMACY TO DOSE Jeremy M Gonda, M.D.       • furosemide (LASIX) injection 10 mg  10 mg Intravenous Once Jabari Nguyen M.D.       • metoprolol (LOPRESSOR) tablet 25 mg  25 mg Oral TWICE DAILY Jabari Nguyen M.D.       • simvastatin (ZOCOR) tablet 20 mg  20 mg Oral Q EVENING Jabari Nguyen M.D.       • levETIRAcetam (KEPPRA) tablet 500 mg  500 mg Oral BID Jabari Nguyen M.D.       • Respiratory Therapy Consult   Nebulization Continuous RT Jeremy M Gonda, M.D.       • ipratropium-albuterol (DUONEB) nebulizer solution  3 mL Nebulization Q2HRS PRN (RT) Jeremy M Gonda, M.D.       • senna-docusate (PERICOLACE or SENOKOT S) 8.6-50 MG per tablet 2 Tab  2 Tab Enteral Tube BID Jeremy M Gonda, M.D.   Stopped at 09/10/20 0600    And   • polyethylene glycol/lytes (MIRALAX) PACKET 1 Packet  1 Packet Enteral Tube QDAY PRN Jeremy M Gonda, M.D.        And   • magnesium hydroxide (MILK OF MAGNESIA) suspension 30 mL  30 mL Enteral Tube QDAY PRN Jeremy M Gonda, M.D.        And   • bisacodyl (DULCOLAX) suppository 10 mg  10 mg Rectal QDAY PRN Jeremy M Gonda, M.D.       • MD Alert...ICU Electrolyte Replacement per Pharmacy   Other PHARMACY TO DOSE Jeremy M Gonda, M.D.       • lidocaine (XYLOCAINE) 1 % injection 1-2 mL  1-2 mL Tracheal Tube Q30 MIN PRN Jeremy M Gonda, M.D.       • enoxaparin (LOVENOX) inj 40 mg  40 mg Subcutaneous DAILY Jeremy M Gonda, M.D.   40 mg at 09/10/20 0500   • acetaminophen (TYLENOL) tablet 650 mg  650 mg Enteral Tube Q6HRS PRN Jeremy M Gonda, M.D.       • enalaprilat (VASOTEC) injection 1.25 mg  1.25 mg Intravenous Q6HRS PRN Jeremy M Gonda, M.D.   1.25 mg at 09/10/20 1152   • labetalol (NORMODYNE/TRANDATE) injection 10 mg  10 mg Intravenous Q4HRS PRN Jeremy M Gonda, M.D.       • ondansetron (ZOFRAN)  syringe/vial injection 4 mg  4 mg Intravenous Q4HRS PRN Jeremy M Gonda, M.D.       • insulin regular (HumuLIN R,NovoLIN R) injection  1-6 Units Subcutaneous Q6HRS Jeremy M Gonda, M.D.   Stopped at 09/09/20 1800    And   • glucose 4 g chewable tablet 16 g  16 g Oral Q15 MIN PRN Jeremy M Gonda, M.D.        And   • dextrose 50% (D50W) injection 50 mL  50 mL Intravenous Q15 MIN PRN Jeremy M Gonda, M.D.   50 mL at 09/10/20 0505   • ondansetron (ZOFRAN ODT) dispertab 4 mg  4 mg Enteral Tube Q4HRS PRN Jeremy M Gonda, M.D.           Allergies  No Known Allergies - NKDA    Vital Signs last 24 hours  Temp:  [37.1 °C (98.8 °F)-38 °C (100.4 °F)] 37.1 °C (98.8 °F)  Pulse:  [53-84] 84  Resp:  [12-31] 17  BP: ()/(49-95) 159/76  SpO2:  [87 %-100 %] 99 %    Physical Exam  Physical Exam  Constitutional:       General: He is not in acute distress.     Appearance: He is not ill-appearing.   HENT:      Head: Normocephalic and atraumatic.      Nose: Nose normal.      Mouth/Throat:      Mouth: Mucous membranes are moist.   Eyes:      Extraocular Movements: Extraocular movements intact.      Pupils: Pupils are equal, round, and reactive to light.   Neck:      Musculoskeletal: No neck rigidity or muscular tenderness.   Cardiovascular:      Rate and Rhythm: Normal rate and regular rhythm.      Pulses: Normal pulses.   Pulmonary:      Effort: Pulmonary effort is normal.      Breath sounds: No wheezing or rhonchi.   Abdominal:      General: Abdomen is flat. There is no distension.      Tenderness: There is no abdominal tenderness. There is no guarding or rebound.   Musculoskeletal: Normal range of motion.         General: No swelling or tenderness.   Skin:     General: Skin is warm and dry.      Capillary Refill: Capillary refill takes less than 2 seconds.   Neurological:      Cranial Nerves: No cranial nerve deficit.      Sensory: No sensory deficit.      Motor: No weakness.      Comments: Awake and alert but disoriented          Fluids    Intake/Output Summary (Last 24 hours) at 9/10/2020 1524  Last data filed at 9/10/2020 1400  Gross per 24 hour   Intake 3545.24 ml   Output 1410 ml   Net 2135.24 ml       Laboratory  Recent Results (from the past 48 hour(s))   CBC WITH DIFFERENTIAL    Collection Time: 09/09/20 12:10 PM   Result Value Ref Range    WBC 12.7 (H) 4.8 - 10.8 K/uL    RBC 4.04 (L) 4.70 - 6.10 M/uL    Hemoglobin 14.5 14.0 - 18.0 g/dL    Hematocrit 41.6 (L) 42.0 - 52.0 %    .0 (H) 81.4 - 97.8 fL    MCH 35.9 (H) 27.0 - 33.0 pg    MCHC 34.9 33.7 - 35.3 g/dL    RDW 46.3 35.9 - 50.0 fL    Platelet Count 194 164 - 446 K/uL    MPV 9.8 9.0 - 12.9 fL    Neutrophils-Polys 75.20 (H) 44.00 - 72.00 %    Lymphocytes 12.20 (L) 22.00 - 41.00 %    Monocytes 9.50 0.00 - 13.40 %    Eosinophils 1.80 0.00 - 6.90 %    Basophils 0.40 0.00 - 1.80 %    Immature Granulocytes 0.90 0.00 - 0.90 %    Nucleated RBC 0.00 /100 WBC    Neutrophils (Absolute) 9.53 (H) 1.82 - 7.42 K/uL    Lymphs (Absolute) 1.55 1.00 - 4.80 K/uL    Monos (Absolute) 1.20 (H) 0.00 - 0.85 K/uL    Eos (Absolute) 0.23 0.00 - 0.51 K/uL    Baso (Absolute) 0.05 0.00 - 0.12 K/uL    Immature Granulocytes (abs) 0.12 (H) 0.00 - 0.11 K/uL    NRBC (Absolute) 0.00 K/uL   COMP METABOLIC PANEL    Collection Time: 09/09/20 12:10 PM   Result Value Ref Range    Sodium 129 (L) 135 - 145 mmol/L    Potassium 3.9 3.6 - 5.5 mmol/L    Chloride 95 (L) 96 - 112 mmol/L    Co2 16 (L) 20 - 33 mmol/L    Anion Gap 18.0 (H) 7.0 - 16.0    Glucose 120 (H) 65 - 99 mg/dL    Bun 7 (L) 8 - 22 mg/dL    Creatinine 0.87 0.50 - 1.40 mg/dL    Calcium 8.2 (L) 8.5 - 10.5 mg/dL    AST(SGOT) 38 12 - 45 U/L    ALT(SGPT) 13 2 - 50 U/L    Alkaline Phosphatase 64 30 - 99 U/L    Total Bilirubin 0.4 0.1 - 1.5 mg/dL    Albumin 3.7 3.2 - 4.9 g/dL    Total Protein 6.7 6.0 - 8.2 g/dL    Globulin 3.0 1.9 - 3.5 g/dL    A-G Ratio 1.2 g/dL   TROPONIN    Collection Time: 09/09/20 12:10 PM   Result Value Ref Range    Troponin T 30 (H)  6 - 19 ng/L   APTT    Collection Time: 09/09/20 12:10 PM   Result Value Ref Range    APTT 27.8 24.7 - 36.0 sec   PROTHROMBIN TIME    Collection Time: 09/09/20 12:10 PM   Result Value Ref Range    PT 14.0 12.0 - 14.6 sec    INR 1.04 0.87 - 1.13   LACTIC ACID    Collection Time: 09/09/20 12:10 PM   Result Value Ref Range    Lactic Acid 6.5 (HH) 0.5 - 2.0 mmol/L   Triglycerides Starting now and then Every 3 Days    Collection Time: 09/09/20 12:10 PM   Result Value Ref Range    Triglycerides 136 0 - 149 mg/dL   ESTIMATED GFR    Collection Time: 09/09/20 12:10 PM   Result Value Ref Range    GFR If African American >60 >60 mL/min/1.73 m 2    GFR If Non African American >60 >60 mL/min/1.73 m 2   ISTAT ARTERIAL BLOOD GAS    Collection Time: 09/09/20 12:57 PM   Result Value Ref Range    Ph 7.266 (LL) 7.400 - 7.500    Pco2 39.0 (H) 26.0 - 37.0 mmHg    Po2 126 (H) 64 - 87 mmHg    Tco2 19 (L) 20 - 33 mmol/L    S02 98 93 - 99 %    Hco3 17.7 17.0 - 25.0 mmol/L    BE -9 (L) -4 - 3 mmol/L    Body Temp see below degrees    O2 Therapy 40 %    iPF Ratio 315     Specimen Arterial     Action Range Triggered YES     Inst. Qualified Patient YES    Urinalysis    Collection Time: 09/09/20  1:24 PM    Specimen: Urine   Result Value Ref Range    Color Yellow     Character Clear     Ph 6.0 5.0 - 8.0    Glucose Negative Negative mg/dL    Ketones Trace (A) Negative mg/dL    Protein 100 (A) Negative mg/dL    Bilirubin Negative Negative    Urobilinogen, Urine 0.2 Negative    Nitrite Negative Negative    Leukocyte Esterase Negative Negative    Occult Blood Trace (A) Negative    Micro Urine Req Microscopic    REFRACTOMETER SG    Collection Time: 09/09/20  1:24 PM   Result Value Ref Range    Specific Gravity 1.011    URINE MICROSCOPIC (W/UA)    Collection Time: 09/09/20  1:24 PM   Result Value Ref Range    WBC 0-2 (A) /hpf    RBC 2-5 (A) /hpf    Bacteria Negative None /hpf    Epithelial Cells Negative /hpf   Blood Culture    Collection Time: 09/09/20   1:40 PM    Specimen: Peripheral; Blood   Result Value Ref Range    Significant Indicator NEG     Source BLD     Site PERIPHERAL     Culture Result       No Growth  Note: Blood cultures are incubated for 5 days and  are monitored continuously.Positive blood cultures  are called to the RN and reported as soon as  they are identified.     Routine (COVID/SARS COV-2 In-House PCR up to 24 hours)    Collection Time: 20  1:40 PM    Specimen: Nasopharyngeal; Respirate   Result Value Ref Range    COVID Order Status Received    SARS-CoV-2, PCR (In-House)    Collection Time: 20  1:40 PM   Result Value Ref Range    SARS-CoV-2 Source NP Swab     SARS-CoV-2 by PCR NotDetected    Lactic Acid -STAT Once    Collection Time: 20  1:50 PM   Result Value Ref Range    Lactic Acid 2.9 (H) 0.5 - 2.0 mmol/L   Prothrombin time (INR)    Collection Time: 20  1:50 PM   Result Value Ref Range    PT 14.3 12.0 - 14.6 sec    INR 1.08 0.87 - 1.13   Blood Culture    Collection Time: 20  1:50 PM    Specimen: Peripheral; Blood   Result Value Ref Range    Significant Indicator NEG     Source BLD     Site PERIPHERAL     Culture Result       No Growth  Note: Blood cultures are incubated for 5 days and  are monitored continuously.Positive blood cultures  are called to the RN and reported as soon as  they are identified.     TROPONIN    Collection Time: 20  1:50 PM   Result Value Ref Range    Troponin T 63 (H) 6 - 19 ng/L   EKG    Collection Time: 20  4:27 PM   Result Value Ref Range    Report       [facility]    Test Date:  2020  Pt Name:    CHITRA PUCKETT               Department: ER  MRN:        [id number]                      Room:       [facility]  Gender:     Male                         Technician: [facility]  :        [id number]                   Requested By:JEREMY M GONDA  Order #:    [id number]                    Reading MD: Brenton Trujillo MD    Measurements  Intervals                                 Axis  Rate:       61                           P:          70  NH:         240                          QRS:        -61  QRSD:       100                          T:          15  QT:         452  QTc:        456    Interpretive Statements  SINUS RHYTHM  FIRST DEGREE AV BLOCK  INFERIOR INFARCT, AGE INDETERMINATE  ANTERIOR INFARCT, AGE INDETERMINATE  BASELINE WANDER IN LEAD(S) V1  No previous ECG available for comparison  Electronically Signed On 2020 16:51:11 PDT by Brenton Trujillo MD     LACTIC ACID    Collection Time: 20  4:55 PM   Result Value Ref Range    Lactic Acid 1.7 0.5 - 2.0 mmol/L   Basic Metabolic Panel    Collection Time: 20  4:55 PM   Result Value Ref Range    Sodium 129 (L) 135 - 145 mmol/L    Potassium 4.2 3.6 - 5.5 mmol/L    Chloride 96 96 - 112 mmol/L    Co2 18 (L) 20 - 33 mmol/L    Glucose 126 (H) 65 - 99 mg/dL    Bun 9 8 - 22 mg/dL    Creatinine 0.87 0.50 - 1.40 mg/dL    Calcium 8.3 (L) 8.5 - 10.5 mg/dL    Anion Gap 15.0 7.0 - 16.0   MAGNESIUM    Collection Time: 20  4:55 PM   Result Value Ref Range    Magnesium 2.1 1.5 - 2.5 mg/dL   PHOSPHORUS    Collection Time: 20  4:55 PM   Result Value Ref Range    Phosphorus 2.4 (L) 2.5 - 4.5 mg/dL   ESTIMATED GFR    Collection Time: 20  4:55 PM   Result Value Ref Range    GFR If African American >60 >60 mL/min/1.73 m 2    GFR If Non African American >60 >60 mL/min/1.73 m 2   EKG    Collection Time: 20  4:59 PM   Result Value Ref Range    Report       [facility]    Test Date:  2020  Pt Name:    CHITRA PUCKETT               Department: 161  MRN:        [id number]                      Room:       T624  Gender:     Male                         Technician: KEVAN  :        [id number]                   Requested By:JEREMY M GONDA  Order #:    [id number]                    Reading MD: Gucci Walton MD    Measurements  Intervals                                Axis  Rate:       52                           P:           0  SD:         234                          QRS:        -56  QRSD:       100                          T:          -9  QT:         476  QTc:        443    Interpretive Statements  Sinus bradycardia with premature ectopic beats  FIRST DEGREE AV BLOCK  LATE PRECORDIAL R/S TRANSITION  Compared to ECG 09/09/2020 16:27:04  Normal sinus rhythm no longer present  Electronically Signed On 9- 7:00:15 PDT by Gucci Walton MD     ACCU-CHEK GLUCOSE    Collection Time: 09/09/20  6:35 PM   Result Value Ref Range    Glucose - Accu-Ck 127 (H) 65 - 99 mg/dL   LACTIC ACID    Collection Time: 09/09/20  8:40 PM   Result Value Ref Range    Lactic Acid 1.6 0.5 - 2.0 mmol/L   TROPONIN    Collection Time: 09/09/20  8:40 PM   Result Value Ref Range    Troponin T 72 (H) 6 - 19 ng/L   LACTIC ACID    Collection Time: 09/10/20  3:00 AM   Result Value Ref Range    Lactic Acid 2.5 (H) 0.5 - 2.0 mmol/L   CBC with Differential    Collection Time: 09/10/20  3:00 AM   Result Value Ref Range    WBC 10.5 4.8 - 10.8 K/uL    RBC 3.71 (L) 4.70 - 6.10 M/uL    Hemoglobin 13.3 (L) 14.0 - 18.0 g/dL    Hematocrit 37.8 (L) 42.0 - 52.0 %    .9 (H) 81.4 - 97.8 fL    MCH 35.8 (H) 27.0 - 33.0 pg    MCHC 35.2 33.7 - 35.3 g/dL    RDW 46.8 35.9 - 50.0 fL    Platelet Count 159 (L) 164 - 446 K/uL    MPV 10.0 9.0 - 12.9 fL    Neutrophils-Polys 67.80 44.00 - 72.00 %    Lymphocytes 14.80 (L) 22.00 - 41.00 %    Monocytes 15.80 (H) 0.00 - 13.40 %    Eosinophils 0.70 0.00 - 6.90 %    Basophils 0.30 0.00 - 1.80 %    Immature Granulocytes 0.60 0.00 - 0.90 %    Nucleated RBC 0.00 /100 WBC    Neutrophils (Absolute) 7.14 1.82 - 7.42 K/uL    Lymphs (Absolute) 1.56 1.00 - 4.80 K/uL    Monos (Absolute) 1.66 (H) 0.00 - 0.85 K/uL    Eos (Absolute) 0.07 0.00 - 0.51 K/uL    Baso (Absolute) 0.03 0.00 - 0.12 K/uL    Immature Granulocytes (abs) 0.06 0.00 - 0.11 K/uL    NRBC (Absolute) 0.00 K/uL   Basic Metabolic Panel (BMP)    Collection Time: 09/10/20  3:00 AM   Result Value  Ref Range    Sodium 131 (L) 135 - 145 mmol/L    Potassium 4.3 3.6 - 5.5 mmol/L    Chloride 97 96 - 112 mmol/L    Co2 17 (L) 20 - 33 mmol/L    Glucose 100 (H) 65 - 99 mg/dL    Bun 8 8 - 22 mg/dL    Creatinine 0.75 0.50 - 1.40 mg/dL    Calcium 8.4 (L) 8.5 - 10.5 mg/dL    Anion Gap 17.0 (H) 7.0 - 16.0   Magnesium    Collection Time: 09/10/20  3:00 AM   Result Value Ref Range    Magnesium 1.9 1.5 - 2.5 mg/dL   Phosphorus    Collection Time: 09/10/20  3:00 AM   Result Value Ref Range    Phosphorus 3.3 2.5 - 4.5 mg/dL   TROPONIN    Collection Time: 09/10/20  3:00 AM   Result Value Ref Range    Troponin T 62 (H) 6 - 19 ng/L   ACCU-CHEK GLUCOSE    Collection Time: 09/10/20  3:00 AM   Result Value Ref Range    Glucose - Accu-Ck 81 65 - 99 mg/dL   ESTIMATED GFR    Collection Time: 09/10/20  3:00 AM   Result Value Ref Range    GFR If African American >60 >60 mL/min/1.73 m 2    GFR If Non African American >60 >60 mL/min/1.73 m 2   ACCU-CHEK GLUCOSE    Collection Time: 09/10/20  4:58 AM   Result Value Ref Range    Glucose - Accu-Ck 58 (L) 65 - 99 mg/dL   ISTAT ARTERIAL BLOOD GAS    Collection Time: 09/10/20  5:30 AM   Result Value Ref Range    Ph 7.481 7.400 - 7.500    Pco2 21.8 (L) 26.0 - 37.0 mmHg    Po2 128 (H) 64 - 87 mmHg    Tco2 17 (L) 20 - 33 mmol/L    S02 99 93 - 99 %    Hco3 16.2 (L) 17.0 - 25.0 mmol/L    BE -5 (L) -4 - 3 mmol/L    Body Temp 37.3 C degrees    O2 Therapy 40 %    iPF Ratio 320     Ph Temp Mitch 7.477 7.400 - 7.500    Pco2 Temp Co 22.0 (L) 26.0 - 37.0 mmHg    Po2 Temp Cor 130 (H) 64 - 87 mmHg    Specimen Arterial     Action Range Triggered NO     Inst. Qualified Patient YES    ACCU-CHEK GLUCOSE    Collection Time: 09/10/20  5:45 AM   Result Value Ref Range    Glucose - Accu-Ck 169 (H) 65 - 99 mg/dL       Imaging  DX-CHEST-PORTABLE (1 VIEW)   Final Result         1.  Pulmonary edema and/or infiltrates are identified, which are stable since the prior exam.   2.  Cardiomegaly      MR-BRAIN-W/O   Final Result       1.  Diffuse severe atrophy.   2.  Probable periventricular white matter microvascular ischemic changes.   3.  No acute stroke.   4.  Probable sequela of prior punctate hemorrhage in the LEFT occipital region.  No findings to indicate acute hemorrhage.      Preliminary findings were discussed with Dr. Carey on by Dr. Aguirre 9/10/2020 8:28 AM.      DX-CHEST-LIMITED (1 VIEW)   Final Result      1.  No acute cardiopulmonary disease.   2.  Supportive tubing as described above.      CT-CTA HEAD WITH & W/O-POST PROCESS   Final Result      CT angiogram of the [place] of [place] within normal limits.      CT-CEREBRAL PERFUSION ANALYSIS   Final Result      1.  Cerebral blood flow less than 30% likely representing completed infarct = 0 mL.      2.  T Max more than 6 seconds likely representing combination of completed infarct and ischemia = 70 mL.      3.  Mismatched volume likely representing ischemic brain/penumbra = 70 mL      4.  Please note that the cerebral perfusion was performed on the limited brain tissue around the basal ganglia region. Infarct/ischemia outside the CT perfusion sections can be missed in this study.      CT-HEAD W/O   Final Result      1.  Limited exam showing no acute intracranial hemorrhage or territorial infarct.   2.  Diffuse atrophy and white matter microvascular ischemic changes.   3.  Motion artifact limits exam.         * Personally reviewed CXR showing enlarged cardiac silhouette, minimal perihilar fullness, sternotomy wires, ETT/FT in good position  * EKG pending    Assessment/Plan  * Acute respiratory failure with hypoxia (HCC)  Assessment & Plan  Extubated on 9/10  Improving oxygen requirement   Requires airway monitoring with risk for re-intubation  Lasix 10 mg IVP x 1 then reassess    Encephalopathy  Assessment & Plan  Consistent with post-ictal state  Improving   Neuro consult following    Seizure (HCC)  Assessment & Plan  EEG  [facility]  Neuro consult  Seizure precautions  MRI brain  with:  1.  Diffuse severe atrophy.  2.  Probable periventricular white matter microvascular ischemic changes.  3.  No acute stroke.  4.  Probable sequela of prior punctate hemorrhage in the LEFT occipital region.  No findings to indicate acute hemorrhage.    Patient requires ICU monitoring status post extubation for hypoxic respiratory failure as he is high risk for reintubation and mechanical ventilation.    Discussed patient condition and risk of morbidity and/or mortality with Family, RN, RT, Pharmacy, Charge nurse / hot rounds, Patient, neurology and Emergency physician.      The patient remains critically ill.  Critical care time = 35 minutes in directly providing and coordinating critical care and extensive data review.  No time overlap and excludes procedures.

## 2020-09-10 NOTE — PROGRESS NOTES
Blood Sugar resulted at 58. D5W administered per hypoglycemia protocol.    Dr. Carey notified via [employer]. New orders received to switch IVF from LR at 83 mL/hr to D5LR at 83 mL/hr, see MAR.

## 2020-09-10 NOTE — RESPIRATORY CARE
Pt extubated per MD.   No stridor noted.  Cuff leak present.   Pt placed on 2L NC with no complications.

## 2020-09-10 NOTE — PROGRESS NOTES
Neurology Progress Note  Neurohospitalist Service, [facility]    Referring Physician: Jeremy M Gonda, M.D.    Chief Complaint   Patient presents with   • Seizure     hx of,  intubated in route   • Possible Stroke     per EMS possible (R) gaze       HPI: Refer to initial documented Neurology H&P, as detailed in the patient's chart.    Interval History 9/10/2020: No acute events overnight, including no seizure like activity. Currently patient is lying in bed, intubated and off sedation since 10:50, awakens to verbal stimuli, nodding appropriately, and following commands. He denies headache, vision changes, weakness, or numbness/tingling by nodding.     Past Medical History:   Past Medical History:   Diagnosis Date   • Seizure disorder (HCC)    • Stroke (HCC)         FHx:  History reviewed. No pertinent family history. Patient intubated and unable to provide.    SHx:  Social History     Socioeconomic History   • Marital status:      Spouse name: Not on file   • Number of children: Not on file   • Years of education: Not on file   • Highest education level: Not on file   Occupational History   • Not on file   Social Needs   • Financial resource strain: Not on file   • Food insecurity     Worry: Not on file     Inability: Not on file   • Transportation needs     Medical: Not on file     Non-medical: Not on file   Tobacco Use   • Smoking status: Not on file   Substance and Sexual Activity   • Alcohol use: Not on file   • Drug use: Not on file   • Sexual activity: Not on file   Lifestyle   • Physical activity     Days per week: Not on file     Minutes per session: Not on file   • Stress: Not on file   Relationships   • Social connections     Talks on phone: Not on file     Gets together: Not on file     Attends Taoism service: Not on file     Active member of club or organization: Not on file     Attends meetings of clubs or organizations: Not on file     Relationship status: Not on file    • Intimate partner violence     Fear of current or ex partner: Not on file     Emotionally abused: Not on file     Physically abused: Not on file     Forced sexual activity: Not on file   Other Topics Concern   • Not on file   Social History Narrative   • Not on file        Medications:    Current Facility-Administered Medications:   •  dexmedetomidine (PRECEDEX) 400 mcg/100mL NS premix infusion, 0.1-1.5 mcg/kg/hr, Intravenous, Continuous, Jabari Nguyen M.D., Stopped at 09/10/20 1045  •  Pharmacy Consult: Enteral tube insertion - review meds/change route/product selection, 1 Each, Other, PHARMACY TO DOSE, Jeremy M Gonda, M.D.  •  Respiratory Therapy Consult, , Nebulization, Continuous RT, Jeremy M Gonda, M.D.  •  ipratropium-albuterol (DUONEB) nebulizer solution, 3 mL, Nebulization, Q2HRS PRN (RT), Jeremy M Gonda, M.D.  •  famotidine (PEPCID) tablet 20 mg, 20 mg, Enteral Tube, Q12HRS **OR** famotidine (PEPCID) injection 20 mg, 20 mg, Intravenous, Q12HRS, Jeremy M Gonda, M.D., 20 mg at 09/10/20 0459  •  senna-docusate (PERICOLACE or SENOKOT S) 8.6-50 MG per tablet 2 Tab, 2 Tab, Enteral Tube, BID, Stopped at 09/10/20 0600 **AND** polyethylene glycol/lytes (MIRALAX) PACKET 1 Packet, 1 Packet, Enteral Tube, QDAY PRN **AND** magnesium hydroxide (MILK OF MAGNESIA) suspension 30 mL, 30 mL, Enteral Tube, QDAY PRN **AND** bisacodyl (DULCOLAX) suppository 10 mg, 10 mg, Rectal, QDAY PRN, Jeremy M Gonda, M.D.  •  MD Alert...ICU Electrolyte Replacement per Pharmacy, , Other, PHARMACY TO DOSE, Jeremy M Gonda, M.D.  •  lidocaine (XYLOCAINE) 1 % injection 1-2 mL, 1-2 mL, Tracheal Tube, Q30 MIN PRN, Jeremy M Gonda, M.D.  •  enoxaparin (LOVENOX) inj 40 mg, 40 mg, Subcutaneous, DAILY, Jeremy M Gonda, M.D., 40 mg at 09/10/20 0500  •  acetaminophen (TYLENOL) tablet 650 mg, 650 mg, Enteral Tube, Q6HRS PRN, Jeremy M Gonda, M.D.  •  enalaprilat (VASOTEC) injection 1.25 mg, 1.25 mg, Intravenous, Q6HRS PRN, Jeremy M Gonda, M.D., 1.25  mg at 09/10/20 1152  •  labetalol (NORMODYNE/TRANDATE) injection 10 mg, 10 mg, Intravenous, Q4HRS PRN, Jeremy M Gonda, M.D.  •  ondansetron (ZOFRAN) syringe/vial injection 4 mg, 4 mg, Intravenous, Q4HRS PRN, Jeremy M Gonda, M.D.  •  fentaNYL (SUBLIMAZE) injection 100 mcg, 100 mcg, Intravenous, Q15 MIN PRN, 100 mcg at 09/09/20 1621 **AND** fentaNYL (SUBLIMAZE) injection 200 mcg, 200 mcg, Intravenous, Q15 MIN PRN, 200 mcg at 09/10/20 0110 **AND** fentaNYL (SUBLIMAZE) 50 mcg/mL in 50mL (Continuous Infusion), , Intravenous, Continuous, Stopped at 09/10/20 1118 **AND** [DISCONTINUED] propofol (DIPRIVAN) injection, 0-80 mcg/kg/min, Intravenous, Continuous, Stopped at 09/10/20 1051 **AND** [CANCELED] Triglyceride, , , Every 3 Days (0300), Jeremy M Gonda, M.D.  •  insulin regular (HumuLIN R,NovoLIN R) injection, 1-6 Units, Subcutaneous, Q6HRS, Stopped at 09/09/20 1800 **AND** POC Blood Glucose, , , Q6H **AND** NOTIFY MD and PharmD, , , Once **AND** glucose 4 g chewable tablet 16 g, 16 g, Oral, Q15 MIN PRN **AND** dextrose 50% (D50W) injection 50 mL, 50 mL, Intravenous, Q15 MIN PRN, Jeremy M Gonda, M.D., 50 mL at 09/10/20 0505  •  levETIRAcetam (Keppra) 500 mg in 100 mL NaCl IV premix, 500 mg, Intravenous, Q12HRS, Jeremy M Gonda, M.D., Stopped at 09/10/20 0515  •  ondansetron (ZOFRAN ODT) dispertab 4 mg, 4 mg, Enteral Tube, Q4HRS PRN, Jeremy M Gonda, M.D.    Allergies:  No Known Allergies     Review of systems: In addition to what is detailed in the HPI and interval history above, all other systems reviewed and are negative. ROS limited by intubation.       Physical Examination:   Vitals:    09/10/20 0800 09/10/20 1000 09/10/20 1056 09/10/20 1200   BP: 135/68 150/64  (!) 182/88   Pulse: (!) 58 (!) 57 (!) 57 79   Resp: 20 (!) 21 20 16   Temp:  37.1 °C (98.8 °F)     TempSrc:  Bladder  Bladder   SpO2: 100% 100% 100% 100%   Weight:       Height:         General: Patient in no acute distress, irritable, but cooperative.  HEENT:  Normocephalic, no signs of acute trauma.   Neck: Supple, no meningeal signs or carotid bruits. There is normal range of motion. No tenderness on exam.   Chest: Intubated/ventilated, clear to auscultation. No cough.   CV: RRR, no murmurs.   Skin: No signs of acute rashes or trauma.   Musculoskeletal: joints exhibit full range of motion, without any pain to palpation. There are no signs of joint or muscle swelling. There is no tenderness to deep palpation of muscles.   Psychiatric: No hallucinatory behavior. Unable to verbalize symptoms of depression or suicidal ideation. Mood and affect appear normal on exam.     NEUROLOGICAL EXAM:   Mental status, orientation: Awakens briskly to verbal stimuli, follows simple commands, intubated off sedation, patient shook head no when asked if he knew where he was or what happened.    Speech and language: Intubated, patient unable to verbalize.   Memory: Intubated, patient unable to verbalize.  Cranial nerve exam: Pupils are 3 mm bilaterally and equally reactive to light and accommodation. Visual fields are intact by confrontation. There is no nystagmus on primary or secondary gaze. Intact full EOM in all directions of gaze. Face appears symmetric. Sensation in the face is intact to light touch. Unable to assess uvula, palate, tongue given intubation and patient not following these commands. Sternocleidomastoid muscles exhibit is normal strength bilaterally. Shoulder shrug is intact bilaterally.   Motor exam: Strength is 5/5 in all extremities. Tone is normal. No abnormal movements were seen on exam.   Sensory exam Withdrawals to noxious stimuli in all extremities.    Deep tendon reflexes:  2+ throughout. Plantar responses are flexor. There is no clonus.   Coordination: Patient non-contributory to follow these commands.    Gait: Deferred given intubation.       Ancillary Data Reviewed:    Labs:  Lab Results   Component Value Date/Time    PROTHROMBTM 14.3 09/09/2020 01:50 PM    INR  1.08 09/09/2020 01:50 PM      Lab Results   Component Value Date/Time    WBC 10.5 09/10/2020 03:00 AM    RBC 3.71 (L) 09/10/2020 03:00 AM    HEMOGLOBIN 13.3 (L) 09/10/2020 03:00 AM    HEMATOCRIT 37.8 (L) 09/10/2020 03:00 AM    .9 (H) 09/10/2020 03:00 AM    MCH 35.8 (H) 09/10/2020 03:00 AM    MCHC 35.2 09/10/2020 03:00 AM    MPV 10.0 09/10/2020 03:00 AM    NEUTSPOLYS 67.80 09/10/2020 03:00 AM    LYMPHOCYTES 14.80 (L) 09/10/2020 03:00 AM    MONOCYTES 15.80 (H) 09/10/2020 03:00 AM    EOSINOPHILS 0.70 09/10/2020 03:00 AM    BASOPHILS 0.30 09/10/2020 03:00 AM      Lab Results   Component Value Date/Time    SODIUM 131 (L) 09/10/2020 03:00 AM    POTASSIUM 4.3 09/10/2020 03:00 AM    CHLORIDE 97 09/10/2020 03:00 AM    CO2 17 (L) 09/10/2020 03:00 AM    GLUCOSE 100 (H) 09/10/2020 03:00 AM    BUN 8 09/10/2020 03:00 AM    CREATININE 0.75 09/10/2020 03:00 AM      Lab Results   Component Value Date/Time    TRIGLYCERIDE 136 09/09/2020 12:10 PM       Lab Results   Component Value Date/Time    ALKPHOSPHAT 64 09/09/2020 12:10 PM    ASTSGOT 38 09/09/2020 12:10 PM    ALTSGPT 13 09/09/2020 12:10 PM    TBILIRUBIN 0.4 09/09/2020 12:10 PM        Imaging/Testing:    I interpreted and/or reviewed the patient's neuroimaging    DX-CHEST-PORTABLE (1 VIEW)   Final Result         1.  Pulmonary edema and/or infiltrates are identified, which are stable since the prior exam.   2.  Cardiomegaly      MR-BRAIN-W/O   Final Result      1.  Diffuse severe atrophy.   2.  Probable periventricular white matter microvascular ischemic changes.   3.  No acute stroke.   4.  Probable sequela of prior punctate hemorrhage in the LEFT occipital region.  No findings to indicate acute hemorrhage.      Preliminary findings were discussed with Dr. Carey on by Dr. Aguirre 9/10/2020 8:28 AM.      DX-CHEST-LIMITED (1 VIEW)   Final Result      1.  No acute cardiopulmonary disease.   2.  Supportive tubing as described above.      CT-CTA HEAD WITH & W/O-POST PROCESS    Final Result      CT angiogram of the [place] within normal limits.      CT-CEREBRAL PERFUSION ANALYSIS   Final Result      1.  Cerebral blood flow less than 30% likely representing completed infarct = 0 mL.      2.  T Max more than 6 seconds likely representing combination of completed infarct and ischemia = 70 mL.      3.  Mismatched volume likely representing ischemic brain/penumbra = 70 mL      4.  Please note that the cerebral perfusion was performed on the limited brain tissue around the basal ganglia region. Infarct/ischemia outside the CT perfusion sections can be missed in this study.      CT-HEAD W/O   Final Result      1.  Limited exam showing no acute intracranial hemorrhage or territorial infarct.   2.  Diffuse atrophy and white matter microvascular ischemic changes.   3.  Motion artifact limits exam.             Assessment:    Tavo Ramos is a 85 y.o. male with relevant history of CAD with 3 stents, prior stroke with tPA in 2019, and seizure disorder who presented on 9/9/20 for whom neurology was consulted to address seizure like activity. Patient was reportedly unresponsive to questions from his wife with blank stare and right arm stiff and extended, then had generalized shaking. Patient was [facility] via care flight, administered Versed for seizures, and intubated for airway protection in the field. EEG 9/9/20 was abnormal with left hemispheric focal slowing and periodic lateralized epileptiform discharges (PLEDs), prominent moreso over the left temporal lobe. Patient was loaded with Keppra 2g and started on 500mg BID. These findings correlate to the patient's history of seizures as well as stroke as nidus. MRI brain wo contrast 9/10/20 showed severe diffuse atrophy, no acute stroke, and probable sequela of prior punctate hemorrhage in the left occipital lobe likely from chronic stroke. Neurological exam today is improved with patient off sedation now opening eyes to  verbal stimuli, nodding appropriately, without focal deficits seen, and following simple commands. Dr. Nguyen later extubated patient today without difficulty.    Plan:    -q4h and PRN neuro assessment. VS per nursing/unit protocol. Normotensive BP goal. Antihypertensives per primary team.   -Telemetry and pulse oximetry; currently SR. Screen for tachyarrhythmia, especially if in the setting of seizure activity.   -Continue Keppra 500mg BID  -Report to [facility] as patient not to drive for 3 months following seizures per [facility] Law.   -All other medical management per ICU/primary team.   -DVT PPX: SCDs.      The evaluation of the patient, and recommended management, was discussed with Dr. Kamryn Underwood, Dr. Nguyen, and bedside RN. I have performed a physical exam and reviewed and updated ROS and Plan today (9/10/2020). In review of yesterday's note (9/9/2020), there are no changes except as documented above.    No further recommendations or further studies from a neurological standpoint at this time. Please re-consult if you have further questions or there is a change in status.      ROLANDA Zambrano.   Nurse Practitioner, Neurohospitalist  [facility]  t) [phone] (f) [phone]

## 2020-09-10 NOTE — PROGRESS NOTES
Critical Care - Cross Cover Note  (5:07 PM)    Cross Cover Issue:  Notified of bradycardia.  Stat EKG: SB 50's 1st deg AVB; -150, on propofol 60; held now; sedate.      Vent Mode: APVCMV  Rate (breaths/min):  [18-20] 20  PEEP/CPAP:  [8] 8  PIP:  [22-23] 22  MAP:  [8.2-12] 12    MDM: Hold, decrease propofol, and fentanyl infusion and pushes PRN.  Labs sent, recheck electrolytes, magnesium, phosphorus.  Will trend troponin enzymes.  We will continue to monitor closely in ICU.

## 2020-09-10 NOTE — PROGRESS NOTES
2 RN Skin Assessment Completed by Kyra CR RN and Radha MATHEW RN.    Head: redness and WDL  Ears: WDL  Nose: redness and blanching  Mouth: redness and WDL  Neck: WDL  Breasts/Chest: WDL, Scar  Shoulder Blades: WDL  Spine: WDL  (R) Arm/Elbow/hand: redness, blanching, scab and discoloration  (L) Arm/Elbow/hand: redness, bruising, scab, discoloration and WDL  Abdomen:WDL  Groin: redness, blanching, excoriation and swelling  Sacrum/Coccyx/Buttocks: redness, blanching, excoriated and moisture fissure  (R) Leg: WDL  (L) Leg: WDL  (R) Heel/Foot/Toe: boggy  (L) Heel/Foot/Toe: boggy          Devices in place: ECG, BP Cuff, Pulse Ox, Ryan Temperature and SCD's    Interventions in place: NC cheek stickers, Heel Mepilex, Sacral Mepilex, Waffle overlay, Pillows, Q2 turns and Low air loss mattress     Possible skin injury found: No    Pictures uploaded into Epic: N/A  Wound Consult Placed: N/A

## 2020-09-11 ENCOUNTER — APPOINTMENT (OUTPATIENT)
Dept: RADIOLOGY | Facility: MEDICAL CENTER | Age: 85
DRG: 100 | End: 2020-09-11
Attending: INTERNAL MEDICINE
Payer: MEDICARE

## 2020-09-11 LAB
ANION GAP SERPL CALC-SCNC: 12 MMOL/L (ref 7–16)
BASOPHILS # BLD AUTO: 0.2 % (ref 0–1.8)
BASOPHILS # BLD: 0.03 K/UL (ref 0–0.12)
BUN SERPL-MCNC: 8 MG/DL (ref 8–22)
CALCIUM SERPL-MCNC: 8.8 MG/DL (ref 8.5–10.5)
CHLORIDE SERPL-SCNC: 96 MMOL/L (ref 96–112)
CO2 SERPL-SCNC: 24 MMOL/L (ref 20–33)
CREAT SERPL-MCNC: 0.74 MG/DL (ref 0.5–1.4)
EOSINOPHIL # BLD AUTO: 0 K/UL (ref 0–0.51)
EOSINOPHIL NFR BLD: 0 % (ref 0–6.9)
ERYTHROCYTE [DISTWIDTH] IN BLOOD BY AUTOMATED COUNT: 47.5 FL (ref 35.9–50)
GLUCOSE BLD-MCNC: 107 MG/DL (ref 65–99)
GLUCOSE BLD-MCNC: 111 MG/DL (ref 65–99)
GLUCOSE BLD-MCNC: 115 MG/DL (ref 65–99)
GLUCOSE SERPL-MCNC: 129 MG/DL (ref 65–99)
HCT VFR BLD AUTO: 39.2 % (ref 42–52)
HGB BLD-MCNC: 13.7 G/DL (ref 14–18)
IMM GRANULOCYTES # BLD AUTO: 0.05 K/UL (ref 0–0.11)
IMM GRANULOCYTES NFR BLD AUTO: 0.4 % (ref 0–0.9)
LYMPHOCYTES # BLD AUTO: 0.9 K/UL (ref 1–4.8)
LYMPHOCYTES NFR BLD: 6.8 % (ref 22–41)
MAGNESIUM SERPL-MCNC: 1.7 MG/DL (ref 1.5–2.5)
MCH RBC QN AUTO: 35.6 PG (ref 27–33)
MCHC RBC AUTO-ENTMCNC: 34.9 G/DL (ref 33.7–35.3)
MCV RBC AUTO: 101.8 FL (ref 81.4–97.8)
MONOCYTES # BLD AUTO: 1.93 K/UL (ref 0–0.85)
MONOCYTES NFR BLD AUTO: 14.6 % (ref 0–13.4)
NEUTROPHILS # BLD AUTO: 10.27 K/UL (ref 1.82–7.42)
NEUTROPHILS NFR BLD: 78 % (ref 44–72)
NRBC # BLD AUTO: 0 K/UL
NRBC BLD-RTO: 0 /100 WBC
PHOSPHATE SERPL-MCNC: 3.7 MG/DL (ref 2.5–4.5)
PLATELET # BLD AUTO: 149 K/UL (ref 164–446)
PMV BLD AUTO: 9.9 FL (ref 9–12.9)
POTASSIUM SERPL-SCNC: 3.8 MMOL/L (ref 3.6–5.5)
RBC # BLD AUTO: 3.85 M/UL (ref 4.7–6.1)
SODIUM SERPL-SCNC: 132 MMOL/L (ref 135–145)
WBC # BLD AUTO: 13.2 K/UL (ref 4.8–10.8)

## 2020-09-11 PROCEDURE — 700111 HCHG RX REV CODE 636 W/ 250 OVERRIDE (IP): Performed by: INTERNAL MEDICINE

## 2020-09-11 PROCEDURE — 770022 HCHG ROOM/CARE - ICU (200)

## 2020-09-11 PROCEDURE — 700102 HCHG RX REV CODE 250 W/ 637 OVERRIDE(OP): Performed by: INTERNAL MEDICINE

## 2020-09-11 PROCEDURE — 85025 COMPLETE CBC W/AUTO DIFF WBC: CPT

## 2020-09-11 PROCEDURE — 700102 HCHG RX REV CODE 250 W/ 637 OVERRIDE(OP): Performed by: HOSPITALIST

## 2020-09-11 PROCEDURE — 71045 X-RAY EXAM CHEST 1 VIEW: CPT

## 2020-09-11 PROCEDURE — 83735 ASSAY OF MAGNESIUM: CPT

## 2020-09-11 PROCEDURE — 74018 RADEX ABDOMEN 1 VIEW: CPT

## 2020-09-11 PROCEDURE — A9270 NON-COVERED ITEM OR SERVICE: HCPCS | Performed by: HOSPITALIST

## 2020-09-11 PROCEDURE — A9270 NON-COVERED ITEM OR SERVICE: HCPCS | Performed by: INTERNAL MEDICINE

## 2020-09-11 PROCEDURE — 84100 ASSAY OF PHOSPHORUS: CPT

## 2020-09-11 PROCEDURE — 82962 GLUCOSE BLOOD TEST: CPT | Mod: 91

## 2020-09-11 PROCEDURE — 80048 BASIC METABOLIC PNL TOTAL CA: CPT

## 2020-09-11 PROCEDURE — 99233 SBSQ HOSP IP/OBS HIGH 50: CPT | Performed by: HOSPITALIST

## 2020-09-11 RX ORDER — FUROSEMIDE 20 MG/1
20 TABLET ORAL DAILY
Status: DISCONTINUED | OUTPATIENT
Start: 2020-09-11 | End: 2020-09-14 | Stop reason: HOSPADM

## 2020-09-11 RX ORDER — FUROSEMIDE 10 MG/ML
10 INJECTION INTRAMUSCULAR; INTRAVENOUS ONCE
Status: COMPLETED | OUTPATIENT
Start: 2020-09-11 | End: 2020-09-11

## 2020-09-11 RX ORDER — FINASTERIDE 5 MG/1
5 TABLET, FILM COATED ORAL DAILY
Status: DISCONTINUED | OUTPATIENT
Start: 2020-09-11 | End: 2020-09-11

## 2020-09-11 RX ORDER — METOCLOPRAMIDE HYDROCHLORIDE 5 MG/ML
5 INJECTION INTRAMUSCULAR; INTRAVENOUS EVERY 6 HOURS
Status: DISCONTINUED | OUTPATIENT
Start: 2020-09-11 | End: 2020-09-14 | Stop reason: HOSPADM

## 2020-09-11 RX ORDER — AMLODIPINE BESYLATE 5 MG/1
5 TABLET ORAL
Status: DISCONTINUED | OUTPATIENT
Start: 2020-09-11 | End: 2020-09-14 | Stop reason: HOSPADM

## 2020-09-11 RX ORDER — MAGNESIUM SULFATE HEPTAHYDRATE 40 MG/ML
2 INJECTION, SOLUTION INTRAVENOUS ONCE
Status: COMPLETED | OUTPATIENT
Start: 2020-09-11 | End: 2020-09-11

## 2020-09-11 RX ORDER — METOPROLOL SUCCINATE 25 MG/1
50 TABLET, EXTENDED RELEASE ORAL DAILY
Status: DISCONTINUED | OUTPATIENT
Start: 2020-09-11 | End: 2020-09-11

## 2020-09-11 RX ORDER — METOPROLOL SUCCINATE 25 MG/1
100 TABLET, EXTENDED RELEASE ORAL DAILY
Status: DISCONTINUED | OUTPATIENT
Start: 2020-09-11 | End: 2020-09-11

## 2020-09-11 RX ADMIN — LEVETIRACETAM 500 MG: 500 TABLET ORAL at 05:04

## 2020-09-11 RX ADMIN — DOCUSATE SODIUM 50 MG AND SENNOSIDES 8.6 MG 2 TABLET: 8.6; 5 TABLET, FILM COATED ORAL at 18:23

## 2020-09-11 RX ADMIN — LABETALOL HYDROCHLORIDE 10 MG: 5 INJECTION, SOLUTION INTRAVENOUS at 12:57

## 2020-09-11 RX ADMIN — ONDANSETRON 4 MG: 2 INJECTION INTRAMUSCULAR; INTRAVENOUS at 05:43

## 2020-09-11 RX ADMIN — METOPROLOL TARTRATE 25 MG: 25 TABLET, FILM COATED ORAL at 18:01

## 2020-09-11 RX ADMIN — ACETAMINOPHEN 650 MG: 325 TABLET, FILM COATED ORAL at 14:20

## 2020-09-11 RX ADMIN — FUROSEMIDE 10 MG: 10 INJECTION, SOLUTION INTRAMUSCULAR; INTRAVENOUS at 14:20

## 2020-09-11 RX ADMIN — LABETALOL HYDROCHLORIDE 10 MG: 5 INJECTION, SOLUTION INTRAVENOUS at 20:08

## 2020-09-11 RX ADMIN — AMLODIPINE BESYLATE 5 MG: 5 TABLET ORAL at 18:13

## 2020-09-11 RX ADMIN — SIMVASTATIN 20 MG: 20 TABLET, FILM COATED ORAL at 18:23

## 2020-09-11 RX ADMIN — ENALAPRILAT 1.25 MG: 1.25 INJECTION INTRAVENOUS at 04:33

## 2020-09-11 RX ADMIN — METOPROLOL TARTRATE 25 MG: 25 TABLET, FILM COATED ORAL at 05:04

## 2020-09-11 RX ADMIN — ONDANSETRON 4 MG: 2 INJECTION INTRAMUSCULAR; INTRAVENOUS at 18:02

## 2020-09-11 RX ADMIN — ACETAMINOPHEN 650 MG: 325 TABLET, FILM COATED ORAL at 01:06

## 2020-09-11 RX ADMIN — MAGNESIUM SULFATE 2 G: 2 INJECTION INTRAVENOUS at 11:46

## 2020-09-11 RX ADMIN — LABETALOL HYDROCHLORIDE 10 MG: 5 INJECTION, SOLUTION INTRAVENOUS at 03:32

## 2020-09-11 RX ADMIN — LEVETIRACETAM 500 MG: 500 TABLET ORAL at 20:08

## 2020-09-11 RX ADMIN — ENOXAPARIN SODIUM 40 MG: 40 INJECTION SUBCUTANEOUS at 05:03

## 2020-09-11 RX ADMIN — ONDANSETRON 4 MG: 2 INJECTION INTRAMUSCULAR; INTRAVENOUS at 11:45

## 2020-09-11 ASSESSMENT — FIBROSIS 4 INDEX: FIB4 SCORE: 5.87

## 2020-09-11 NOTE — PROGRESS NOTES
Hospital Medicine Daily Progress Note    Date of Service  9/11/2020    Chief Complaint  85 y.o. male admitted 9/9/2020 with seizure    Hospital Course    84 yo male who presented 9/9/2020 with seizures and respiratory failure. Patient was in his usual state of health until this morning when he was sitting in a chair and started to have sudden onset RUE weakness and inability to speak. He subsequently developed a witnessed (by wife) generalized shaking episode concerning for seizure lasting a 5-15 min until EMS arrived and administered versed. Post-versed, patient's shaking stopped but then started to snore and desaturate prompting nasal intubation by the FD. Care Flight was dispatched and transported patient on a vent. Required additional versed en route for agitation. In ED underwent CT imaging and neuro was consulted. Starting to awaken on my exam without ongoing seizure-like activity, on hospital day 1.  Patient was subsequently extubated.  Stable for discharge out of ICU on hospital day 2.  He has persistent encephalopathy.      Interval Problem Update  No acute overnight events, to the best of patient's recollection, which is somewhat poor at this juncture.    Consultants/Specialty  Neurology    Code Status  Full Code    Disposition  Pending physical and Occupational Therapy evaluations.    Review of Systems  Review of Systems   Unable to perform ROS: Dementia        Physical Exam  Temp:  [37.3 °C (99.1 °F)] 37.3 °C (99.1 °F)  Pulse:  [] 70  Resp:  [13-25] 16  BP: (133-176)/(72-98) 133/78  SpO2:  [91 %-100 %] 95 %  General: No acute distress, encephalopathic versus demented.  HEENT atraumatic, normocephalic, pupils equal round reactive to light  Chest: Respirations are unlabored  Cardiac: Physiologic S1 and S2  Abdomen: Soft, nontender, nondistended  Extremities: Without clubbing, cyanosis or edema  Neuro: Cranial nerves II through XII are grossly intact.      Current Facility-Administered Medications:    •  metoprolol (LOPRESSOR) tablet 25 mg, 25 mg, Oral, TWICE DAILY, Jabari Nguyen M.D., 25 mg at 09/11/20 0504  •  simvastatin (ZOCOR) tablet 20 mg, 20 mg, Oral, Q EVENING, Jabari Nguyen M.D., Stopped at 09/10/20 1800  •  levETIRAcetam (KEPPRA) tablet 500 mg, 500 mg, Oral, BID, Jabari Nguyen M.D., 500 mg at 09/11/20 0504  •  Respiratory Therapy Consult, , Nebulization, Continuous RT, Jeremy M Gonda, M.D.  •  ipratropium-albuterol (DUONEB) nebulizer solution, 3 mL, Nebulization, Q2HRS PRN (RT), Jeremy M Gonda, M.D.  •  senna-docusate (PERICOLACE or SENOKOT S) 8.6-50 MG per tablet 2 Tab, 2 Tab, Enteral Tube, BID, Stopped at 09/10/20 0600 **AND** polyethylene glycol/lytes (MIRALAX) PACKET 1 Packet, 1 Packet, Enteral Tube, QDAY PRN **AND** magnesium hydroxide (MILK OF MAGNESIA) suspension 30 mL, 30 mL, Enteral Tube, QDAY PRN **AND** bisacodyl (DULCOLAX) suppository 10 mg, 10 mg, Rectal, QDAY PRN, Jeremy M Gonda, M.D.  •  MD Alert...ICU Electrolyte Replacement per Pharmacy, , Other, PHARMACY TO DOSE, Jeremy M Gonda, M.D.  •  enoxaparin (LOVENOX) inj 40 mg, 40 mg, Subcutaneous, DAILY, Jeremy M Gonda, M.D., 40 mg at 09/11/20 0503  •  acetaminophen (TYLENOL) tablet 650 mg, 650 mg, Enteral Tube, Q6HRS PRN, Jeremy M Gonda, M.D., 650 mg at 09/11/20 1420  •  enalaprilat (VASOTEC) injection 1.25 mg, 1.25 mg, Intravenous, Q6HRS PRN, Jeremy M Gonda, M.D., 1.25 mg at 09/11/20 0433  •  labetalol (NORMODYNE/TRANDATE) injection 10 mg, 10 mg, Intravenous, Q4HRS PRN, Jeremy M Gonda, M.D., 10 mg at 09/11/20 1257  •  ondansetron (ZOFRAN) syringe/vial injection 4 mg, 4 mg, Intravenous, Q4HRS PRN, Jeremy M Gonda, M.D., 4 mg at 09/11/20 1145  •  insulin regular (HumuLIN R,NovoLIN R) injection, 1-6 Units, Subcutaneous, Q6HRS, Stopped at 09/09/20 1800 **AND** POC Blood Glucose, , , Q6H **AND** NOTIFY MD and PharmD, , , Once **AND** glucose 4 g chewable tablet 16 g, 16 g, Oral, Q15 MIN PRN **AND** dextrose 50% (D50W) injection 50  mL, 50 mL, Intravenous, Q15 MIN PRN, Jeremy M Gonda, M.D., 50 mL at 09/10/20 0505  •  ondansetron (ZOFRAN ODT) dispertab 4 mg, 4 mg, Enteral Tube, Q4HRS PRN, Jeremy M Gonda, M.D.        Fluids    Intake/Output Summary (Last 24 hours) at 9/11/2020 1513  Last data filed at 9/11/2020 0600  Gross per 24 hour   Intake 130 ml   Output 875 ml   Net -745 ml       Laboratory  Recent Labs     09/09/20  1210 09/10/20  0300 09/11/20  0305   WBC 12.7* 10.5 13.2*   RBC 4.04* 3.71* 3.85*   HEMOGLOBIN 14.5 13.3* 13.7*   HEMATOCRIT 41.6* 37.8* 39.2*   .0* 101.9* 101.8*   MCH 35.9* 35.8* 35.6*   MCHC 34.9 35.2 34.9   RDW 46.3 46.8 47.5   PLATELETCT 194 159* 149*   MPV 9.8 10.0 9.9     Recent Labs     09/09/20  1655 09/10/20  0300 09/11/20  0305   SODIUM 129* 131* 132*   POTASSIUM 4.2 4.3 3.8   CHLORIDE 96 97 96   CO2 18* 17* 24   GLUCOSE 126* 100* 129*   BUN 9 8 8   CREATININE 0.87 0.75 0.74   CALCIUM 8.3* 8.4* 8.8     Recent Labs     09/09/20  1210 09/09/20  1350   APTT 27.8  --    INR 1.04 1.08         Recent Labs     09/09/20  1210   TRIGLYCERIDE 136       Imaging  [id number] VIEW   Final Result      No evidence of bowel obstruction. Possible ileus.                  DX-CHEST-PORTABLE (1 VIEW)   Final Result         1.  Pulmonary edema and/or infiltrates are identified, which are stable since the prior exam.   2.  Cardiomegaly      DX-CHEST-PORTABLE (1 VIEW)   Final Result         1.  Pulmonary edema and/or infiltrates are identified, which are stable since the prior exam.   2.  Cardiomegaly      MR-BRAIN-W/O   Final Result      1.  Diffuse severe atrophy.   2.  Probable periventricular white matter microvascular ischemic changes.   3.  No acute stroke.   4.  Probable sequela of prior punctate hemorrhage in the LEFT occipital region.  No findings to indicate acute hemorrhage.      Preliminary findings were discussed with Dr. Carey on by Dr. Aguirre 9/10/2020 8:28 AM.      DX-CHEST-LIMITED (1 VIEW)   Final Result      1.  No  acute cardiopulmonary disease.   2.  Supportive tubing as described above.      CT-CTA HEAD WITH & W/O-POST PROCESS   Final Result      CT angiogram of the [place] within normal limits.      CT-CEREBRAL PERFUSION ANALYSIS   Final Result      1.  Cerebral blood flow less than 30% likely representing completed infarct = 0 mL.      2.  T Max more than 6 seconds likely representing combination of completed infarct and ischemia = 70 mL.      3.  Mismatched volume likely representing ischemic brain/penumbra = 70 mL      4.  Please note that the cerebral perfusion was performed on the limited brain tissue around the basal ganglia region. Infarct/ischemia outside the CT perfusion sections can be missed in this study.      CT-HEAD W/O   Final Result      1.  Limited exam showing no acute intracranial hemorrhage or territorial infarct.   2.  Diffuse atrophy and white matter microvascular ischemic changes.   3.  Motion artifact limits exam.              Assessment/Plan  * Acute respiratory failure with hypoxia (HCC)  Assessment & Plan  Successfully extubated, improved.  Continue supportive care with duo nebs as noted above.    Encephalopathy  Assessment & Plan  Currently alert and oriented x1.  Post ictal versus dementia versus any other cause of acute delirium.    Seizure (HCC)  Assessment & Plan  Continue antiepileptics under the direction of neurology on consult, assistance appreciated.       VTE prophylaxis: LMWH

## 2020-09-11 NOTE — ASSESSMENT & PLAN NOTE
Currently alert and oriented x3.  Given that he is improving I do favor postictal state versus any other cause of acute delirium.  Speech language and cognitive eval will be checked.

## 2020-09-12 ENCOUNTER — APPOINTMENT (OUTPATIENT)
Dept: RADIOLOGY | Facility: MEDICAL CENTER | Age: 85
DRG: 100 | End: 2020-09-12
Attending: INTERNAL MEDICINE
Payer: MEDICARE

## 2020-09-12 LAB
ANION GAP SERPL CALC-SCNC: 14 MMOL/L (ref 7–16)
BASOPHILS # BLD AUTO: 0.4 % (ref 0–1.8)
BASOPHILS # BLD: 0.05 K/UL (ref 0–0.12)
BUN SERPL-MCNC: 11 MG/DL (ref 8–22)
CALCIUM SERPL-MCNC: 9 MG/DL (ref 8.5–10.5)
CHLORIDE SERPL-SCNC: 98 MMOL/L (ref 96–112)
CO2 SERPL-SCNC: 22 MMOL/L (ref 20–33)
CREAT SERPL-MCNC: 0.75 MG/DL (ref 0.5–1.4)
EOSINOPHIL # BLD AUTO: 0.05 K/UL (ref 0–0.51)
EOSINOPHIL NFR BLD: 0.4 % (ref 0–6.9)
ERYTHROCYTE [DISTWIDTH] IN BLOOD BY AUTOMATED COUNT: 47.3 FL (ref 35.9–50)
GLUCOSE BLD-MCNC: 93 MG/DL (ref 65–99)
GLUCOSE BLD-MCNC: 97 MG/DL (ref 65–99)
GLUCOSE SERPL-MCNC: 109 MG/DL (ref 65–99)
HCT VFR BLD AUTO: 40.1 % (ref 42–52)
HGB BLD-MCNC: 14 G/DL (ref 14–18)
IMM GRANULOCYTES # BLD AUTO: 0.05 K/UL (ref 0–0.11)
IMM GRANULOCYTES NFR BLD AUTO: 0.4 % (ref 0–0.9)
LYMPHOCYTES # BLD AUTO: 1.54 K/UL (ref 1–4.8)
LYMPHOCYTES NFR BLD: 13.8 % (ref 22–41)
MAGNESIUM SERPL-MCNC: 2 MG/DL (ref 1.5–2.5)
MCH RBC QN AUTO: 35.8 PG (ref 27–33)
MCHC RBC AUTO-ENTMCNC: 34.9 G/DL (ref 33.7–35.3)
MCV RBC AUTO: 102.6 FL (ref 81.4–97.8)
MONOCYTES # BLD AUTO: 2.1 K/UL (ref 0–0.85)
MONOCYTES NFR BLD AUTO: 18.8 % (ref 0–13.4)
NEUTROPHILS # BLD AUTO: 7.37 K/UL (ref 1.82–7.42)
NEUTROPHILS NFR BLD: 66.2 % (ref 44–72)
NRBC # BLD AUTO: 0 K/UL
NRBC BLD-RTO: 0 /100 WBC
PHOSPHATE SERPL-MCNC: 2.7 MG/DL (ref 2.5–4.5)
PLATELET # BLD AUTO: 164 K/UL (ref 164–446)
PMV BLD AUTO: 10.1 FL (ref 9–12.9)
POTASSIUM SERPL-SCNC: 3.7 MMOL/L (ref 3.6–5.5)
RBC # BLD AUTO: 3.91 M/UL (ref 4.7–6.1)
SODIUM SERPL-SCNC: 134 MMOL/L (ref 135–145)
WBC # BLD AUTO: 11.2 K/UL (ref 4.8–10.8)

## 2020-09-12 PROCEDURE — 700111 HCHG RX REV CODE 636 W/ 250 OVERRIDE (IP): Performed by: HOSPITALIST

## 2020-09-12 PROCEDURE — 92610 EVALUATE SWALLOWING FUNCTION: CPT

## 2020-09-12 PROCEDURE — 700102 HCHG RX REV CODE 250 W/ 637 OVERRIDE(OP): Performed by: HOSPITALIST

## 2020-09-12 PROCEDURE — 700102 HCHG RX REV CODE 250 W/ 637 OVERRIDE(OP): Performed by: INTERNAL MEDICINE

## 2020-09-12 PROCEDURE — 84100 ASSAY OF PHOSPHORUS: CPT

## 2020-09-12 PROCEDURE — A9270 NON-COVERED ITEM OR SERVICE: HCPCS | Performed by: HOSPITALIST

## 2020-09-12 PROCEDURE — 99233 SBSQ HOSP IP/OBS HIGH 50: CPT | Performed by: HOSPITALIST

## 2020-09-12 PROCEDURE — 82962 GLUCOSE BLOOD TEST: CPT

## 2020-09-12 PROCEDURE — 770001 HCHG ROOM/CARE - MED/SURG/GYN PRIV*

## 2020-09-12 PROCEDURE — 700111 HCHG RX REV CODE 636 W/ 250 OVERRIDE (IP): Performed by: INTERNAL MEDICINE

## 2020-09-12 PROCEDURE — 85025 COMPLETE CBC W/AUTO DIFF WBC: CPT

## 2020-09-12 PROCEDURE — 83735 ASSAY OF MAGNESIUM: CPT

## 2020-09-12 PROCEDURE — A9270 NON-COVERED ITEM OR SERVICE: HCPCS | Performed by: INTERNAL MEDICINE

## 2020-09-12 PROCEDURE — 80048 BASIC METABOLIC PNL TOTAL CA: CPT

## 2020-09-12 RX ADMIN — AMLODIPINE BESYLATE 5 MG: 5 TABLET ORAL at 05:38

## 2020-09-12 RX ADMIN — METOPROLOL TARTRATE 25 MG: 25 TABLET, FILM COATED ORAL at 17:41

## 2020-09-12 RX ADMIN — METOCLOPRAMIDE 5 MG: 5 INJECTION, SOLUTION INTRAMUSCULAR; INTRAVENOUS at 17:42

## 2020-09-12 RX ADMIN — METOCLOPRAMIDE 5 MG: 5 INJECTION, SOLUTION INTRAMUSCULAR; INTRAVENOUS at 05:39

## 2020-09-12 RX ADMIN — FUROSEMIDE 20 MG: 20 TABLET ORAL at 05:38

## 2020-09-12 RX ADMIN — ENOXAPARIN SODIUM 40 MG: 40 INJECTION SUBCUTANEOUS at 05:39

## 2020-09-12 RX ADMIN — METOCLOPRAMIDE 5 MG: 5 INJECTION, SOLUTION INTRAMUSCULAR; INTRAVENOUS at 00:00

## 2020-09-12 RX ADMIN — LEVETIRACETAM 500 MG: 500 TABLET ORAL at 17:41

## 2020-09-12 RX ADMIN — SIMVASTATIN 20 MG: 20 TABLET, FILM COATED ORAL at 17:41

## 2020-09-12 RX ADMIN — LEVETIRACETAM 500 MG: 500 TABLET ORAL at 05:38

## 2020-09-12 RX ADMIN — METOCLOPRAMIDE 5 MG: 5 INJECTION, SOLUTION INTRAMUSCULAR; INTRAVENOUS at 12:26

## 2020-09-12 RX ADMIN — METOPROLOL TARTRATE 25 MG: 25 TABLET, FILM COATED ORAL at 05:38

## 2020-09-12 RX ADMIN — ACETAMINOPHEN 650 MG: 325 TABLET, FILM COATED ORAL at 05:39

## 2020-09-12 RX ADMIN — ENALAPRILAT 1.25 MG: 1.25 INJECTION INTRAVENOUS at 19:58

## 2020-09-12 ASSESSMENT — COGNITIVE AND FUNCTIONAL STATUS - GENERAL
SUGGESTED CMS G CODE MODIFIER DAILY ACTIVITY: CK
TOILETING: A LOT
MOBILITY SCORE: 13
DRESSING REGULAR UPPER BODY CLOTHING: A LOT
DAILY ACTIVITIY SCORE: 14
STANDING UP FROM CHAIR USING ARMS: A LOT
HELP NEEDED FOR BATHING: A LOT
MOVING FROM LYING ON BACK TO SITTING ON SIDE OF FLAT BED: A LOT
DRESSING REGULAR LOWER BODY CLOTHING: A LOT
TURNING FROM BACK TO SIDE WHILE IN FLAT BAD: A LITTLE
PERSONAL GROOMING: A LOT
WALKING IN HOSPITAL ROOM: A LOT
SUGGESTED CMS G CODE MODIFIER MOBILITY: CL
CLIMB 3 TO 5 STEPS WITH RAILING: A LOT
MOVING TO AND FROM BED TO CHAIR: A LOT

## 2020-09-12 ASSESSMENT — PAIN DESCRIPTION - PAIN TYPE: TYPE: ACUTE PAIN

## 2020-09-12 NOTE — CARE PLAN
Problem: Venous Thromboembolism (VTW)/Deep Vein Thrombosis (DVT) Prevention:  Goal: Patient will participate in Venous Thrombosis (VTE)/Deep Vein Thrombosis (DVT)Prevention Measures  Intervention: Ensure patient wears graduated elastic stockings ([place] hose) and/or SCDs, if ordered, when in bed or chair (Remove at least once per shift for skin check)  Note: SCD's on when patient is in bed     Problem: Risk for Impaired Mobility--Activity Intolerance  Goal: Mobilize and/or Transfer Safely with Maximum Swisher  Intervention: Progressive Mobilization--ADL Flow Sheet  Note: Patient mobilized up to the chair with assistance of two.

## 2020-09-12 NOTE — PROGRESS NOTES
Hospital Medicine Daily Progress Note    Date of Service  9/12/2020    Chief Complaint  85 y.o. male admitted 9/9/2020 with seizure    Hospital Course    86 yo male who presented 9/9/2020 with seizures and respiratory failure. Patient was in his usual state of health until this morning when he was sitting in a chair and started to have sudden onset RUE weakness and inability to speak. He subsequently developed a witnessed (by wife) generalized shaking episode concerning for seizure lasting a 5-15 min until EMS arrived and administered versed. Post-versed, patient's shaking stopped but then started to snore and desaturate prompting nasal intubation by the FD. [facility] was dispatched and transported patient on a vent. Required additional versed en route for agitation. In ED underwent CT imaging and neuro was consulted. Starting to awaken on my exam without ongoing seizure-like activity, on hospital day 1.  Patient was subsequently extubated.  Stable for discharge out of ICU on hospital day 2.  He has persistent encephalopathy.      Interval Problem Update  No acute overnight events, to the best of patient's recollection, which is somewhat poor at this juncture.    Consultants/Specialty  Neurology    Code Status  Full Code    Disposition  Pending physical and Occupational Therapy evaluations.    Review of Systems  Review of Systems   Unable to perform ROS: Dementia        Physical Exam  Pulse:  [] 70  Resp:  [15-39] 39  BP: (133-182)/(78-94) 163/79  SpO2:  [83 %-98 %] 95 %  General: No acute distress, A&Ox4 today.   HEENT atraumatic, normocephalic, pupils equal round reactive to light  Chest: Respirations are unlabored  Cardiac: Physiologic S1 and S2  Abdomen: Soft, nontender, nondistended  Extremities: Without clubbing, cyanosis or edema  Neuro: Cranial nerves II through XII are grossly intact.      Current Facility-Administered Medications:   •  furosemide (LASIX) tablet 20 mg, 20 mg, Enteral Tube, DAILY,  Bernabe Joseph M.D., 20 mg at 09/12/20 0538  •  amLODIPine (NORVASC) tablet 5 mg, 5 mg, Enteral Tube, Q DAY, Bernabe Joseph M.D., 5 mg at 09/12/20 0538  •  metoprolol (LOPRESSOR) tablet 25 mg, 25 mg, Enteral Tube, TWICE DAILY, Bernabe Joseph M.D., 25 mg at 09/12/20 0538  •  metoclopramide (REGLAN) injection 5 mg, 5 mg, Intravenous, Q6HRS, Bernabe Joseph M.D., 5 mg at 09/12/20 0539  •  simvastatin (ZOCOR) tablet 20 mg, 20 mg, Oral, Q EVENING, Jabari Nguyen M.D., 20 mg at 09/11/20 1823  •  levETIRAcetam (KEPPRA) tablet 500 mg, 500 mg, Oral, BID, Jabari Nguyen M.D., 500 mg at 09/12/20 0538  •  Respiratory Therapy Consult, , Nebulization, Continuous RT, Jeremy M Gonda, M.D.  •  ipratropium-albuterol (DUONEB) nebulizer solution, 3 mL, Nebulization, Q2HRS PRN (RT), Jeremy M Gonda, M.D.  •  senna-docusate (PERICOLACE or SENOKOT S) 8.6-50 MG per tablet 2 Tab, 2 Tab, Enteral Tube, BID, Stopped at 09/12/20 0600 **AND** polyethylene glycol/lytes (MIRALAX) PACKET 1 Packet, 1 Packet, Enteral Tube, QDAY PRN **AND** magnesium hydroxide (MILK OF MAGNESIA) suspension 30 mL, 30 mL, Enteral Tube, QDAY PRN **AND** bisacodyl (DULCOLAX) suppository 10 mg, 10 mg, Rectal, QDAY PRN, Jeremy M Gonda, M.D.  •  enoxaparin (LOVENOX) inj 40 mg, 40 mg, Subcutaneous, DAILY, Jeremy M Gonda, M.D., 40 mg at 09/12/20 0539  •  acetaminophen (TYLENOL) tablet 650 mg, 650 mg, Enteral Tube, Q6HRS PRN, Jeremy M Gonda, M.D., 650 mg at 09/12/20 0539  •  enalaprilat (VASOTEC) injection 1.25 mg, 1.25 mg, Intravenous, Q6HRS PRN, Jeremy M Gonda, M.D., 1.25 mg at 09/11/20 0433  •  labetalol (NORMODYNE/TRANDATE) injection 10 mg, 10 mg, Intravenous, Q4HRS PRN, Jeremy M Gonda, M.D., 10 mg at 09/11/20 2008  •  ondansetron (ZOFRAN) syringe/vial injection 4 mg, 4 mg, Intravenous, Q4HRS PRN, Jeremy M Gonda, M.D., 4 mg at 09/11/20 1802  •  ondansetron (ZOFRAN ODT) dispertab 4 mg, 4 mg, Enteral Tube, Q4HRS PRN, Jeremy M Gonda, M.D.        Fluids    Intake/Output  Summary (Last 24 hours) at 9/12/2020 1156  Last data filed at 9/12/2020 0800  Gross per 24 hour   Intake 100 ml   Output 700 ml   Net -600 ml       Laboratory  Recent Labs     09/10/20  0300 09/11/20  0305 09/12/20  0558   WBC 10.5 13.2* 11.2*   RBC 3.71* 3.85* 3.91*   HEMOGLOBIN 13.3* 13.7* 14.0   HEMATOCRIT 37.8* 39.2* 40.1*   .9* 101.8* 102.6*   MCH 35.8* 35.6* 35.8*   MCHC 35.2 34.9 34.9   RDW 46.8 47.5 47.3   PLATELETCT 159* 149* 164   MPV 10.0 9.9 10.1     Recent Labs     09/10/20  0300 09/11/20  0305 09/12/20  0558   SODIUM 131* 132* 134*   POTASSIUM 4.3 3.8 3.7   CHLORIDE 97 96 98   CO2 17* 24 22   GLUCOSE 100* 129* 109*   BUN 8 8 11   CREATININE 0.75 0.74 0.75   CALCIUM 8.4* 8.8 9.0     Recent Labs     09/09/20  1210 09/09/20  1350   APTT 27.8  --    INR 1.04 1.08         Recent Labs     09/09/20  1210   TRIGLYCERIDE 136       Imaging  [id number] VIEW   Final Result      No evidence of bowel obstruction. Possible ileus.                  DX-CHEST-PORTABLE (1 VIEW)   Final Result         1.  Pulmonary edema and/or infiltrates are identified, which are stable since the prior exam.   2.  Cardiomegaly      DX-CHEST-PORTABLE (1 VIEW)   Final Result         1.  Pulmonary edema and/or infiltrates are identified, which are stable since the prior exam.   2.  Cardiomegaly      MR-BRAIN-W/O   Final Result      1.  Diffuse severe atrophy.   2.  Probable periventricular white matter microvascular ischemic changes.   3.  No acute stroke.   4.  Probable sequela of prior punctate hemorrhage in the LEFT occipital region.  No findings to indicate acute hemorrhage.      Preliminary findings were discussed with Dr. Carey on by Dr. Aguirre 9/10/2020 8:28 AM.      DX-CHEST-LIMITED (1 VIEW)   Final Result      1.  No acute cardiopulmonary disease.   2.  Supportive tubing as described above.      CT-CTA HEAD WITH & W/O-POST PROCESS   Final Result      CT angiogram of the [place] [facility] [place] within normal limits.       CT-CEREBRAL PERFUSION ANALYSIS   Final Result      1.  Cerebral blood flow less than 30% likely representing completed infarct = 0 mL.      2.  T Max more than 6 seconds likely representing combination of completed infarct and ischemia = 70 mL.      3.  Mismatched volume likely representing ischemic brain/penumbra = 70 mL      4.  Please note that the cerebral perfusion was performed on the limited brain tissue around the basal ganglia region. Infarct/ischemia outside the CT perfusion sections can be missed in this study.      CT-HEAD W/O   Final Result      1.  Limited exam showing no acute intracranial hemorrhage or territorial infarct.   2.  Diffuse atrophy and white matter microvascular ischemic changes.   3.  Motion artifact limits exam.              Assessment/Plan  * Acute respiratory failure with hypoxia (HCC)  Assessment & Plan  Successfully extubated, improved.  Continue supportive care with duo nebs as noted above.    Encephalopathy  Assessment & Plan  Currently alert and oriented x3.  Given that he is improving I do favor postictal state versus any other cause of acute delirium.  Speech language and cognitive eval will be checked.    Seizure (HCC)  Assessment & Plan  Continue antiepileptics under the direction of neurology on consult, assistance appreciated.       VTE prophylaxis: LMWH

## 2020-09-12 NOTE — THERAPY
"Speech Language Pathology   Clinical Swallow Evaluation     Patient Name: Tavo Ramos  AGE:  85 y.o., SEX:  male  Medical Record #: [id number]  Today's Date: 9/12/2020       Precautions: Fall Risk, Swallow Precautions ( See Comments)    Assessment    Patient is 85 y.o. male with a diagnosis of seizures, encephalopathy, and acute respiratory failure with hypoxia. Intubated: 9/9 - 9/10. CXR: Pulmonary edema and/or infiltrates are identified, which are stable since the prior exam. MRI of brain: \"Diffuse severe atrophy. Probable periventricular white matter microvascular ischemic changes. No acute stroke.\"     Patient was seen on this date for a clinical swallow evaluation. Patient awake and AAOx4. Vocal quality initially hoarse and wet in quality but improved as PO trials progressed.  Patient denied hx of dysphagia and described foods he ate most consistent with regular diet. Oral motor examination WNL. Patient is edentulous and donned upper and lower denture plates for session. PO trials were administered and consisted of ice chips, MTL, applesauce, pudding, soft and regular solids in mixed consistency form, and thin liquids. Swallow trigger achieved within 2-3 seconds and laryngeal elevation reduced but age appropriate. No overt s/sx of aspiration noted with all trials tested. Vocal quality sounds slightly wet during conversational speech but a cued falsetto revealed strong and clear vocal quality. Mastication mildly reduced but appeared functional for slightly modified diet.     Plan    Recommend an Easy-to-Chew (EC7) and Thin Liquid (TN0) diet given close initial monitoring.     Recommend Speech Therapy 3 times per week until therapy goals are met for the following treatments:  Dysphagia Training and Patient / Family / Caregiver Education.    Discharge Recommendations: Anticipate no further SLP services once discharged     Objective       09/12/20 1008   Vitals   O2 Delivery Device None - Room Air   Prior " Level Of Function   Communication Within Functional Limits   Swallow Within Functional Limits   Dentition Edentulous   Dentures Uppers;Lowers   Hearing Impaired Both Ears   Hearing Aid Right;Left   Patient's Primary Language English   Oral Motor Eval    Is Patient Able to Complete Oral Motor Eval Yes, Within Normal Limits   Laryngeal Function   Voice Quality Minimal  (improved following trials of water)   Volutional Cough Within Functional Limits   Excursion Upon Swallow Weak    Oral Food Presentation   Single Swallow Mildly Thick (2) - ([place]ectar Thick)  Within Functional Limits   Single Swallow Thin (0) Within Functional Limits   Liquidised (3) Within Functional Limits   Pureed (4) Within Functional Limits   Minced & Moist (5) - (Dysphagia II) Within Functional Limits   Soft & Bite-Sized (6) - (Dysphagia III) Within Functional Limits   Regular (7) Minimal   Regular-Easy to Chew (7) Within Functional Limits   Self Feeding Independent   Dysphagia Strategies / Recommendations   Compensatory Strategies Monitor During Meals;Head of Bed 90 Degrees During Eating / Drinking;Single Sips / Bites   Diet / Liquid Recommendation Regular - Easy to Chew (7);Thin (0)   Medication Administration  Whole with Liquid Wash   Short Term Goals   Short Term Goal # 1 Patient will consume an EC7/TN0 diet with no overt s/sx of aspiration.

## 2020-09-12 NOTE — RESPIRATORY CARE
COPD EDUCATION by COPD CLINICAL EDUCATOR  9/12/2020 at 12:40 PM by Lizet Mendoza, RRT     Patient reviewed by COPD education team. Patient does not qualify for the COPD program.

## 2020-09-13 ENCOUNTER — APPOINTMENT (OUTPATIENT)
Dept: RADIOLOGY | Facility: MEDICAL CENTER | Age: 85
DRG: 100 | End: 2020-09-13
Attending: INTERNAL MEDICINE
Payer: MEDICARE

## 2020-09-13 LAB
ANION GAP SERPL CALC-SCNC: 12 MMOL/L (ref 7–16)
BASOPHILS # BLD AUTO: 0.4 % (ref 0–1.8)
BASOPHILS # BLD: 0.03 K/UL (ref 0–0.12)
BUN SERPL-MCNC: 10 MG/DL (ref 8–22)
CALCIUM SERPL-MCNC: 8.9 MG/DL (ref 8.5–10.5)
CHLORIDE SERPL-SCNC: 93 MMOL/L (ref 96–112)
CO2 SERPL-SCNC: 23 MMOL/L (ref 20–33)
CREAT SERPL-MCNC: 0.67 MG/DL (ref 0.5–1.4)
EOSINOPHIL # BLD AUTO: 0.11 K/UL (ref 0–0.51)
EOSINOPHIL NFR BLD: 1.4 % (ref 0–6.9)
ERYTHROCYTE [DISTWIDTH] IN BLOOD BY AUTOMATED COUNT: 45.8 FL (ref 35.9–50)
GLUCOSE SERPL-MCNC: 102 MG/DL (ref 65–99)
HCT VFR BLD AUTO: 38.7 % (ref 42–52)
HGB BLD-MCNC: 13.5 G/DL (ref 14–18)
IMM GRANULOCYTES # BLD AUTO: 0.06 K/UL (ref 0–0.11)
IMM GRANULOCYTES NFR BLD AUTO: 0.7 % (ref 0–0.9)
LYMPHOCYTES # BLD AUTO: 1.63 K/UL (ref 1–4.8)
LYMPHOCYTES NFR BLD: 20.3 % (ref 22–41)
MCH RBC QN AUTO: 35.3 PG (ref 27–33)
MCHC RBC AUTO-ENTMCNC: 34.9 G/DL (ref 33.7–35.3)
MCV RBC AUTO: 101.3 FL (ref 81.4–97.8)
MONOCYTES # BLD AUTO: 1.5 K/UL (ref 0–0.85)
MONOCYTES NFR BLD AUTO: 18.7 % (ref 0–13.4)
NEUTROPHILS # BLD AUTO: 4.69 K/UL (ref 1.82–7.42)
NEUTROPHILS NFR BLD: 58.5 % (ref 44–72)
NRBC # BLD AUTO: 0 K/UL
NRBC BLD-RTO: 0 /100 WBC
PLATELET # BLD AUTO: 172 K/UL (ref 164–446)
PMV BLD AUTO: 10 FL (ref 9–12.9)
POTASSIUM SERPL-SCNC: 3.6 MMOL/L (ref 3.6–5.5)
RBC # BLD AUTO: 3.82 M/UL (ref 4.7–6.1)
SODIUM SERPL-SCNC: 128 MMOL/L (ref 135–145)
WBC # BLD AUTO: 8 K/UL (ref 4.8–10.8)

## 2020-09-13 PROCEDURE — 80048 BASIC METABOLIC PNL TOTAL CA: CPT

## 2020-09-13 PROCEDURE — 700111 HCHG RX REV CODE 636 W/ 250 OVERRIDE (IP): Performed by: HOSPITALIST

## 2020-09-13 PROCEDURE — 99233 SBSQ HOSP IP/OBS HIGH 50: CPT | Performed by: HOSPITALIST

## 2020-09-13 PROCEDURE — 770001 HCHG ROOM/CARE - MED/SURG/GYN PRIV*

## 2020-09-13 PROCEDURE — 85025 COMPLETE CBC W/AUTO DIFF WBC: CPT

## 2020-09-13 PROCEDURE — 700102 HCHG RX REV CODE 250 W/ 637 OVERRIDE(OP): Performed by: HOSPITALIST

## 2020-09-13 PROCEDURE — A9270 NON-COVERED ITEM OR SERVICE: HCPCS | Performed by: INTERNAL MEDICINE

## 2020-09-13 PROCEDURE — A9270 NON-COVERED ITEM OR SERVICE: HCPCS | Performed by: HOSPITALIST

## 2020-09-13 PROCEDURE — 700111 HCHG RX REV CODE 636 W/ 250 OVERRIDE (IP): Performed by: INTERNAL MEDICINE

## 2020-09-13 PROCEDURE — 700102 HCHG RX REV CODE 250 W/ 637 OVERRIDE(OP): Performed by: INTERNAL MEDICINE

## 2020-09-13 RX ORDER — LISINOPRIL 10 MG/1
10 TABLET ORAL
Status: DISCONTINUED | OUTPATIENT
Start: 2020-09-13 | End: 2020-09-14 | Stop reason: HOSPADM

## 2020-09-13 RX ADMIN — LEVETIRACETAM 500 MG: 500 TABLET ORAL at 05:29

## 2020-09-13 RX ADMIN — ENOXAPARIN SODIUM 40 MG: 40 INJECTION SUBCUTANEOUS at 05:28

## 2020-09-13 RX ADMIN — METOCLOPRAMIDE 5 MG: 5 INJECTION, SOLUTION INTRAMUSCULAR; INTRAVENOUS at 05:29

## 2020-09-13 RX ADMIN — METOPROLOL TARTRATE 25 MG: 25 TABLET, FILM COATED ORAL at 05:29

## 2020-09-13 RX ADMIN — FUROSEMIDE 20 MG: 20 TABLET ORAL at 05:29

## 2020-09-13 RX ADMIN — SIMVASTATIN 20 MG: 20 TABLET, FILM COATED ORAL at 18:18

## 2020-09-13 RX ADMIN — LEVETIRACETAM 500 MG: 500 TABLET ORAL at 18:19

## 2020-09-13 RX ADMIN — ENALAPRILAT 1.25 MG: 1.25 INJECTION INTRAVENOUS at 08:36

## 2020-09-13 RX ADMIN — METOPROLOL TARTRATE 25 MG: 25 TABLET, FILM COATED ORAL at 18:18

## 2020-09-13 RX ADMIN — AMLODIPINE BESYLATE 5 MG: 5 TABLET ORAL at 05:29

## 2020-09-13 RX ADMIN — LISINOPRIL 10 MG: 10 TABLET ORAL at 16:40

## 2020-09-13 RX ADMIN — METOCLOPRAMIDE 5 MG: 5 INJECTION, SOLUTION INTRAMUSCULAR; INTRAVENOUS at 11:43

## 2020-09-13 RX ADMIN — METOCLOPRAMIDE 5 MG: 5 INJECTION, SOLUTION INTRAMUSCULAR; INTRAVENOUS at 18:18

## 2020-09-13 NOTE — PROGRESS NOTES
Hospital Medicine Daily Progress Note    Date of Service  9/13/2020    Chief Complaint  85 y.o. male admitted 9/9/2020 with seizure    Hospital Course    86 yo male who presented 9/9/2020 with seizures and respiratory failure. Patient was in his usual state of health until this morning when he was sitting in a chair and started to have sudden onset RUE weakness and inability to speak. He subsequently developed a witnessed (by wife) generalized shaking episode concerning for seizure lasting a 5-15 min until EMS arrived and administered versed. Post-versed, patient's shaking stopped but then started to snore and desaturate prompting nasal intubation by the FD. [facility] was dispatched and transported patient on a vent. Required additional versed en route for agitation. In ED underwent CT imaging and neuro was consulted. Starting to awaken on my exam without ongoing seizure-like activity, on hospital day 1.  Patient was subsequently extubated.  Stable for discharge out of ICU on hospital day 2.  He has persistent encephalopathy, which gradually improved, he is approximately at his baseline on 9/13/2020.      Interval Problem Update  No acute overnight events, eager to return home.    Consultants/Specialty  Neurology    Code Status  Full Code    Disposition  Pending physical and Occupational Therapy evaluations.    Review of Systems  Review of Systems   Unable to perform ROS: Dementia        Physical Exam  Temp:  [36.5 °C (97.7 °F)-37.5 °C (99.5 °F)] 36.6 °C (97.9 °F)  Pulse:  [73-90] 73  Resp:  [14] 14  BP: (124-179)/(60-94) 149/67  SpO2:  [94 %-95 %] 95 %  General: No acute distress, A&Ox4 today.   HEENT atraumatic, normocephalic, pupils equal round reactive to light  Chest: Respirations are unlabored  Cardiac: Physiologic S1 and S2  Abdomen: Soft, nontender, nondistended  Extremities: Without clubbing, cyanosis or edema  Genitourinary: Ryan catheter intact, will be removed on 9/13/2020.  Neuro: Cranial nerves II  through XII are grossly intact.      Current Facility-Administered Medications:   •  furosemide (LASIX) tablet 20 mg, 20 mg, Enteral Tube, DAILY, Bernabe Joseph M.D., 20 mg at 09/13/20 0529  •  amLODIPine (NORVASC) tablet 5 mg, 5 mg, Enteral Tube, Q DAY, Bernabe Joseph M.D., 5 mg at 09/13/20 0529  •  metoprolol (LOPRESSOR) tablet 25 mg, 25 mg, Enteral Tube, TWICE DAILY, Bernabe Joseph M.D., 25 mg at 09/13/20 0529  •  metoclopramide (REGLAN) injection 5 mg, 5 mg, Intravenous, Q6HRS, Bernabe Joseph M.D., 5 mg at 09/13/20 1143  •  simvastatin (ZOCOR) tablet 20 mg, 20 mg, Oral, Q EVENING, Jabari Nguyen M.D., 20 mg at 09/12/20 1741  •  levETIRAcetam (KEPPRA) tablet 500 mg, 500 mg, Oral, BID, Jabari Nguyen M.D., 500 mg at 09/13/20 0529  •  Respiratory Therapy Consult, , Nebulization, Continuous RT, Jeremy M Gonda, M.D.  •  ipratropium-albuterol (DUONEB) nebulizer solution, 3 mL, Nebulization, Q2HRS PRN (RT), Jeremy M Gonda, M.D.  •  senna-docusate (PERICOLACE or SENOKOT S) 8.6-50 MG per tablet 2 Tab, 2 Tab, Enteral Tube, BID, Stopped at 09/12/20 0600 **AND** polyethylene glycol/lytes (MIRALAX) PACKET 1 Packet, 1 Packet, Enteral Tube, QDAY PRN **AND** magnesium hydroxide (MILK OF MAGNESIA) suspension 30 mL, 30 mL, Enteral Tube, QDAY PRN **AND** bisacodyl (DULCOLAX) suppository 10 mg, 10 mg, Rectal, QDAY PRN, Jeremy M Gonda, M.D.  •  enoxaparin (LOVENOX) inj 40 mg, 40 mg, Subcutaneous, DAILY, Jeremy M Gonda, M.D., 40 mg at 09/13/20 0528  •  acetaminophen (TYLENOL) tablet 650 mg, 650 mg, Enteral Tube, Q6HRS PRN, Jeremy M Gonda, M.D., 650 mg at 09/12/20 0539  •  enalaprilat (VASOTEC) injection 1.25 mg, 1.25 mg, Intravenous, Q6HRS PRN, Jeremy M Gonda, M.D., 1.25 mg at 09/13/20 0836  •  labetalol (NORMODYNE/TRANDATE) injection 10 mg, 10 mg, Intravenous, Q4HRS PRN, Jeremy M Gonda, M.D., 10 mg at 09/11/20 2008  •  ondansetron (ZOFRAN) syringe/vial injection 4 mg, 4 mg, Intravenous, Q4HRS PRN, Jeremy M Gonda, M.D., 4  mg at 09/11/20 1802  •  ondansetron (ZOFRAN ODT) dispertab 4 mg, 4 mg, Enteral Tube, Q4HRS PRN, Jeremy M Gonda, M.D.        Fluids    Intake/Output Summary (Last 24 hours) at 9/13/2020 1331  Last data filed at 9/13/2020 0600  Gross per 24 hour   Intake --   Output 625 ml   Net -625 ml       Laboratory  Recent Labs     09/11/20 0305 09/12/20  0558 09/13/20  0525   WBC 13.2* 11.2* 8.0   RBC 3.85* 3.91* 3.82*   HEMOGLOBIN 13.7* 14.0 13.5*   HEMATOCRIT 39.2* 40.1* 38.7*   .8* 102.6* 101.3*   MCH 35.6* 35.8* 35.3*   MCHC 34.9 34.9 34.9   RDW 47.5 47.3 45.8   PLATELETCT 149* 164 172   MPV 9.9 10.1 10.0     Recent Labs     09/11/20 0305 09/12/20  0558 09/13/20  0525   SODIUM 132* 134* 128*   POTASSIUM 3.8 3.7 3.6   CHLORIDE 96 98 93*   CO2 24 22 23   GLUCOSE 129* 109* 102*   BUN 8 11 10   CREATININE 0.74 0.75 0.67   CALCIUM 8.8 9.0 8.9                   Imaging  [id number] VIEW   Final Result      No evidence of bowel obstruction. Possible ileus.                  DX-CHEST-PORTABLE (1 VIEW)   Final Result         1.  Pulmonary edema and/or infiltrates are identified, which are stable since the prior exam.   2.  Cardiomegaly      DX-CHEST-PORTABLE (1 VIEW)   Final Result         1.  Pulmonary edema and/or infiltrates are identified, which are stable since the prior exam.   2.  Cardiomegaly      MR-BRAIN-W/O   Final Result      1.  Diffuse severe atrophy.   2.  Probable periventricular white matter microvascular ischemic changes.   3.  No acute stroke.   4.  Probable sequela of prior punctate hemorrhage in the LEFT occipital region.  No findings to indicate acute hemorrhage.      Preliminary findings were discussed with Dr. Carey on by Dr. Aguirre 9/10/2020 8:28 AM.      DX-CHEST-LIMITED (1 VIEW)   Final Result      1.  No acute cardiopulmonary disease.   2.  Supportive tubing as described above.      CT-CTA HEAD WITH & W/O-POST PROCESS   Final Result      CT angiogram of the [place] [facility] [place] within normal limits.       CT-CEREBRAL PERFUSION ANALYSIS   Final Result      1.  Cerebral blood flow less than 30% likely representing completed infarct = 0 mL.      2.  T Max more than 6 seconds likely representing combination of completed infarct and ischemia = 70 mL.      3.  Mismatched volume likely representing ischemic brain/penumbra = 70 mL      4.  Please note that the cerebral perfusion was performed on the limited brain tissue around the basal ganglia region. Infarct/ischemia outside the CT perfusion sections can be missed in this study.      CT-HEAD W/O   Final Result      1.  Limited exam showing no acute intracranial hemorrhage or territorial infarct.   2.  Diffuse atrophy and white matter microvascular ischemic changes.   3.  Motion artifact limits exam.              Assessment/Plan  * Acute respiratory failure with hypoxia (HCC)  Assessment & Plan  Successfully extubated, improved.  Continue supportive care with duo nebs as noted above.    Encephalopathy  Assessment & Plan  Currently alert and oriented x3.  Given that he is improving I do favor postictal state versus any other cause of acute delirium.  Speech language and cognitive eval will be checked.    Seizure (HCC)  Assessment & Plan  Continue antiepileptics under the direction of neurology on consult, assistance appreciated.       VTE prophylaxis: LMWH

## 2020-09-14 ENCOUNTER — APPOINTMENT (OUTPATIENT)
Dept: RADIOLOGY | Facility: MEDICAL CENTER | Age: 85
DRG: 100 | End: 2020-09-14
Attending: INTERNAL MEDICINE
Payer: MEDICARE

## 2020-09-14 VITALS
HEART RATE: 80 BPM | SYSTOLIC BLOOD PRESSURE: 153 MMHG | BODY MASS INDEX: 28.03 KG/M2 | HEIGHT: 68 IN | RESPIRATION RATE: 20 BRPM | DIASTOLIC BLOOD PRESSURE: 73 MMHG | OXYGEN SATURATION: 91 % | WEIGHT: 184.97 LBS | TEMPERATURE: 97.7 F

## 2020-09-14 PROBLEM — J96.01 ACUTE RESPIRATORY FAILURE WITH HYPOXIA (HCC): Status: RESOLVED | Noted: 2020-09-09 | Resolved: 2020-09-14

## 2020-09-14 LAB
ALBUMIN SERPL BCP-MCNC: 3.6 G/DL (ref 3.2–4.9)
ALBUMIN/GLOB SERPL: 1.1 G/DL
ALP SERPL-CCNC: 56 U/L (ref 30–99)
ALT SERPL-CCNC: 10 U/L (ref 2–50)
ANION GAP SERPL CALC-SCNC: 15 MMOL/L (ref 7–16)
AST SERPL-CCNC: 26 U/L (ref 12–45)
BACTERIA BLD CULT: NORMAL
BACTERIA BLD CULT: NORMAL
BASOPHILS # BLD AUTO: 0.5 % (ref 0–1.8)
BASOPHILS # BLD: 0.04 K/UL (ref 0–0.12)
BILIRUB SERPL-MCNC: 1 MG/DL (ref 0.1–1.5)
BUN SERPL-MCNC: 10 MG/DL (ref 8–22)
CALCIUM SERPL-MCNC: 9.2 MG/DL (ref 8.5–10.5)
CHLORIDE SERPL-SCNC: 95 MMOL/L (ref 96–112)
CO2 SERPL-SCNC: 21 MMOL/L (ref 20–33)
CREAT SERPL-MCNC: 0.66 MG/DL (ref 0.5–1.4)
EOSINOPHIL # BLD AUTO: 0.21 K/UL (ref 0–0.51)
EOSINOPHIL NFR BLD: 2.5 % (ref 0–6.9)
ERYTHROCYTE [DISTWIDTH] IN BLOOD BY AUTOMATED COUNT: 44.5 FL (ref 35.9–50)
GLOBULIN SER CALC-MCNC: 3.4 G/DL (ref 1.9–3.5)
GLUCOSE SERPL-MCNC: 103 MG/DL (ref 65–99)
HCT VFR BLD AUTO: 40 % (ref 42–52)
HGB BLD-MCNC: 13.9 G/DL (ref 14–18)
IMM GRANULOCYTES # BLD AUTO: 0.11 K/UL (ref 0–0.11)
IMM GRANULOCYTES NFR BLD AUTO: 1.3 % (ref 0–0.9)
LYMPHOCYTES # BLD AUTO: 2.03 K/UL (ref 1–4.8)
LYMPHOCYTES NFR BLD: 24.3 % (ref 22–41)
MCH RBC QN AUTO: 34.8 PG (ref 27–33)
MCHC RBC AUTO-ENTMCNC: 34.8 G/DL (ref 33.7–35.3)
MCV RBC AUTO: 100.3 FL (ref 81.4–97.8)
MONOCYTES # BLD AUTO: 1.59 K/UL (ref 0–0.85)
MONOCYTES NFR BLD AUTO: 19 % (ref 0–13.4)
NEUTROPHILS # BLD AUTO: 4.39 K/UL (ref 1.82–7.42)
NEUTROPHILS NFR BLD: 52.4 % (ref 44–72)
NRBC # BLD AUTO: 0 K/UL
NRBC BLD-RTO: 0 /100 WBC
PLATELET # BLD AUTO: 195 K/UL (ref 164–446)
PMV BLD AUTO: 10 FL (ref 9–12.9)
POTASSIUM SERPL-SCNC: 3.5 MMOL/L (ref 3.6–5.5)
PROT SERPL-MCNC: 7 G/DL (ref 6–8.2)
RBC # BLD AUTO: 3.99 M/UL (ref 4.7–6.1)
SIGNIFICANT IND 70042: NORMAL
SIGNIFICANT IND 70042: NORMAL
SITE SITE: NORMAL
SITE SITE: NORMAL
SODIUM SERPL-SCNC: 131 MMOL/L (ref 135–145)
SOURCE SOURCE: NORMAL
SOURCE SOURCE: NORMAL
WBC # BLD AUTO: 8.4 K/UL (ref 4.8–10.8)

## 2020-09-14 PROCEDURE — 92526 ORAL FUNCTION THERAPY: CPT

## 2020-09-14 PROCEDURE — 700111 HCHG RX REV CODE 636 W/ 250 OVERRIDE (IP): Performed by: HOSPITALIST

## 2020-09-14 PROCEDURE — A9270 NON-COVERED ITEM OR SERVICE: HCPCS | Performed by: INTERNAL MEDICINE

## 2020-09-14 PROCEDURE — 700102 HCHG RX REV CODE 250 W/ 637 OVERRIDE(OP): Performed by: INTERNAL MEDICINE

## 2020-09-14 PROCEDURE — 700102 HCHG RX REV CODE 250 W/ 637 OVERRIDE(OP): Performed by: HOSPITALIST

## 2020-09-14 PROCEDURE — 700111 HCHG RX REV CODE 636 W/ 250 OVERRIDE (IP): Performed by: INTERNAL MEDICINE

## 2020-09-14 PROCEDURE — 80053 COMPREHEN METABOLIC PANEL: CPT

## 2020-09-14 PROCEDURE — A9270 NON-COVERED ITEM OR SERVICE: HCPCS | Performed by: HOSPITALIST

## 2020-09-14 PROCEDURE — 99239 HOSP IP/OBS DSCHRG MGMT >30: CPT | Performed by: HOSPITALIST

## 2020-09-14 PROCEDURE — 85025 COMPLETE CBC W/AUTO DIFF WBC: CPT

## 2020-09-14 RX ORDER — AMLODIPINE BESYLATE 5 MG/1
5 TABLET ORAL DAILY
Qty: 30 TAB | Refills: 0 | Status: SHIPPED | OUTPATIENT
Start: 2020-09-15

## 2020-09-14 RX ORDER — LISINOPRIL 10 MG/1
20 TABLET ORAL DAILY
Qty: 30 TAB | Refills: 0 | Status: SHIPPED | OUTPATIENT
Start: 2020-09-15

## 2020-09-14 RX ORDER — LEVETIRACETAM 500 MG/1
500 TABLET ORAL 2 TIMES DAILY
Qty: 60 TAB | Refills: 0 | Status: SHIPPED | OUTPATIENT
Start: 2020-09-14

## 2020-09-14 RX ADMIN — LISINOPRIL 10 MG: 10 TABLET ORAL at 06:04

## 2020-09-14 RX ADMIN — METOCLOPRAMIDE 5 MG: 5 INJECTION, SOLUTION INTRAMUSCULAR; INTRAVENOUS at 00:53

## 2020-09-14 RX ADMIN — FUROSEMIDE 20 MG: 20 TABLET ORAL at 06:05

## 2020-09-14 RX ADMIN — ENOXAPARIN SODIUM 40 MG: 40 INJECTION SUBCUTANEOUS at 06:05

## 2020-09-14 RX ADMIN — METOCLOPRAMIDE 5 MG: 5 INJECTION, SOLUTION INTRAMUSCULAR; INTRAVENOUS at 06:05

## 2020-09-14 RX ADMIN — LEVETIRACETAM 500 MG: 500 TABLET ORAL at 06:04

## 2020-09-14 RX ADMIN — AMLODIPINE BESYLATE 5 MG: 5 TABLET ORAL at 06:04

## 2020-09-14 RX ADMIN — METOPROLOL TARTRATE 25 MG: 25 TABLET, FILM COATED ORAL at 06:04

## 2020-09-14 ASSESSMENT — LIFESTYLE VARIABLES
TOTAL SCORE: 0
CONSUMPTION TOTAL: NEGATIVE
EVER FELT BAD OR GUILTY ABOUT YOUR DRINKING: NO
ALCOHOL_USE: YES
HAVE PEOPLE ANNOYED YOU BY CRITICIZING YOUR DRINKING: NO
DOES PATIENT WANT TO STOP DRINKING: NO
ON A TYPICAL DAY WHEN YOU DRINK ALCOHOL HOW MANY DRINKS DO YOU HAVE: 2
EVER HAD A DRINK FIRST THING IN THE MORNING TO STEADY YOUR NERVES TO GET RID OF A HANGOVER: NO
HAVE YOU EVER FELT YOU SHOULD CUT DOWN ON YOUR DRINKING: NO
HOW MANY TIMES IN THE PAST YEAR HAVE YOU HAD 5 OR MORE DRINKS IN A DAY: 0
AVERAGE NUMBER OF DAYS PER WEEK YOU HAVE A DRINK CONTAINING ALCOHOL: 1
TOTAL SCORE: 0
TOTAL SCORE: 0

## 2020-09-14 ASSESSMENT — FIBROSIS 4 INDEX: FIB4 SCORE: 5.09

## 2020-09-14 ASSESSMENT — COPD QUESTIONNAIRES
DO YOU EVER COUGH UP ANY MUCUS OR PHLEGM?: NO/ONLY WITH OCCASIONAL COLDS OR INFECTIONS
DURING THE PAST 4 WEEKS HOW MUCH DID YOU FEEL SHORT OF BREATH: NONE/LITTLE OF THE TIME
HAVE YOU SMOKED AT LEAST 100 CIGARETTES IN YOUR ENTIRE LIFE: YES
COPD SCREENING SCORE: 4

## 2020-09-14 ASSESSMENT — PATIENT HEALTH QUESTIONNAIRE - PHQ9
1. LITTLE INTEREST OR PLEASURE IN DOING THINGS: NOT AT ALL
2. FEELING DOWN, DEPRESSED, IRRITABLE, OR HOPELESS: NOT AT ALL
SUM OF ALL RESPONSES TO PHQ9 QUESTIONS 1 AND 2: 0

## 2020-09-14 NOTE — DISCHARGE INSTRUCTIONS
Seizure, Adult  A seizure is a sudden burst of abnormal electrical activity in the brain. Seizures usually last from 30 seconds to 2 minutes. They can cause many different symptoms.  Usually, seizures are not harmful unless they last a long time.  What are the causes?  Common causes of this condition include:  · Fever or infection.  · Conditions that affect the brain, such as:  ? A brain abnormality that you were born with.  ? A brain or head injury.  ? Bleeding in the brain.  ? A tumor.  ? Stroke.  ? Brain disorders such as autism or cerebral palsy.  · Low blood sugar.  · Conditions that are passed from parent to child (are inherited).  · Problems with substances, such as:  ? Having a reaction to a drug or a medicine.  ? Suddenly stopping the use of a substance (withdrawal).  In some cases, the cause may not be known. A person who has repeated seizures over time without a clear cause has a condition called epilepsy.  What increases the risk?  You are more likely to get this condition if you have:  · A family history of epilepsy.  · Had a seizure in the past.  · A brain disorder.  · A history of head injury, lack of oxygen at birth, or strokes.  What are the signs or symptoms?  There are many types of seizures. The symptoms vary depending on the type of seizure you have. Examples of symptoms during a seizure include:  · Shaking (convulsions).  · Stiffness in the body.  · Passing out (losing consciousness).  · Head nodding.  · Staring.  · Not responding to sound or touch.  · Loss of bladder control and bowel control.  Some people have symptoms right before and right after a seizure happens.  Symptoms before a seizure may include:  · Fear.  · Worry (anxiety).  · Feeling like you may vomit (nauseous).  · Feeling like the room is spinning (vertigo).  · Feeling like you saw or heard something before (déjà vu).  · Odd tastes or smells.  · Changes in how you see. You may see flashing lights or spots.  Symptoms after a  seizure happens can include:  · Confusion.  · Sleepiness.  · Headache.  · Weakness on one side of the body.  How is this treated?  Most seizures will stop on their own in under 5 minutes. In these cases, no treatment is needed. Seizures that last longer than 5 minutes will usually need treatment. Treatment can include:  · Medicines given through an IV tube.  · Avoiding things that are known to cause your seizures. These can include medicines that you take for another condition.  · Medicines to treat epilepsy.  · Surgery to stop the seizures. This may be needed if medicines do not help.  Follow these instructions at home:  Medicines  · Take over-the-counter and prescription medicines only as told by your doctor.  · Do not eat or drink anything that may keep your medicine from working, such as alcohol.  Activity  · Do not do any activities that would be dangerous if you had another seizure, like driving or swimming. Wait until your doctor says it is safe for you to do them.  · If you live in the [place] ask your local DMV (department of [employer]) when you can drive.  · Get plenty of rest.  Teaching others  Teach friends and family what to do when you have a seizure. They should:  · Lay you on the ground.  · Protect your head and body.  · Loosen any tight clothing around your neck.  · Turn you on your side.  · Not hold you down.  · Not put anything into your mouth.  · Know whether or not you need emergency care.  · Stay with you until you are better.    General instructions  · Contact your doctor each time you have a seizure.  · Avoid anything that gives you seizures.  · Keep a seizure diary. Write down:  ? What you think caused each seizure.  ? What you remember about each seizure.  · Keep all follow-up visits as told by your doctor. This is important.  Contact a doctor if:  · You have another seizure.  · You have seizures more often.  · There is any change in what happens during your seizures.  · You keep having  seizures with treatment.  · You have symptoms of being sick or having an infection.  Get help right away if:  · You have a seizure that:  ? Lasts longer than 5 minutes.  ? Is different than seizures you had before.  ? Makes it harder to breathe.  ? Happens after you hurt your head.  · You have any of these symptoms after a seizure:  ? Not being able to speak.  ? Not being able to use a part of your body.  ? Confusion.  ? A bad headache.  · You have two or more seizures in a row.  · You do not wake up right after a seizure.  · You get hurt during a seizure.  These symptoms may be an emergency. Do not wait to see if the symptoms will go away. Get medical help right away. Call your local emergency services (911 in the [place] Do not drive yourself to the hospital.  Summary  · Seizures usually last from 30 seconds to 2 minutes. Usually, they are not harmful unless they last a long time.  · Do not eat or drink anything that may keep your medicine from working, such as alcohol.  · Teach friends and family what to do when you have a seizure.  · Contact your doctor each time you have a seizure.  This information is not intended to replace advice given to you by your health care provider. Make sure you discuss any questions you have with your health care provider.  Document Released: 06/05/2009 Document Revised: 03/06/2020 Document Reviewed: 03/06/2020  [employer] Patient Education © 2020 [employer]  Discharge Instructions    Discharged to home by car with relative. Discharged via wheelchair, hospital escort: Yes.  Special equipment needed: Not Applicable    Be sure to schedule a follow-up appointment with your primary care doctor or any specialists as instructed.     Discharge Plan:        I understand that a diet low in cholesterol, fat, and sodium is recommended for good health. Unless I have been given specific instructions below for another diet, I accept this instruction as my diet prescription.       Special  Instructions: None    · Is patient discharged on Warfarin / Coumadin?   No     Depression / Suicide Risk    As you are discharged from this [facility] facility, it is important to learn how to keep safe from harming yourself.    Recognize the warning signs:  · Abrupt changes in personality, positive or negative- including increase in energy   · Giving away possessions  · Change in eating patterns- significant weight changes-  positive or negative  · Change in sleeping patterns- unable to sleep or sleeping all the time   · Unwillingness or inability to communicate  · Depression  · Unusual sadness, discouragement and loneliness  · Talk of wanting to die  · Neglect of personal appearance   · Rebelliousness- reckless behavior  · Withdrawal from people/activities they love  · Confusion- inability to concentrate     If you or a loved one observes any of these behaviors or has concerns about self-harm, here's what you can do:  · Talk about it- your feelings and reasons for harming yourself  · Remove any means that you might use to hurt yourself (examples: pills, rope, extension cords, firearm)  · Get professional help from the community (Mental Health, Substance Abuse, psychological counseling)  · Do not be alone:Call your Safe Contact- someone whom you trust who will be there for you.  · Call your local CRISIS HOTLINE 267-4373 or 825-017-2862  · Call your local Children's Mobile Crisis Response Team [place] (398) 100-1818 or www.[employer]  · Call the toll free National Suicide Prevention Hotlines   · National Suicide Prevention Lifeline 043-781-CAIR (4106)  · National Hope Line Network 800-SUICIDE (444-9129)

## 2020-09-14 NOTE — PROGRESS NOTES
Dr. Joseph notified that patient has scrotal swelling and reports that is his baseline with history of hernia. Ryan removed and patient voided w/o difficulty. Pt feels that he emptied bladder completely. Will continue to monitor.

## 2020-09-14 NOTE — CARE PLAN
Problem: Communication  Goal: The ability to communicate needs accurately and effectively will improve  Outcome: PROGRESSING AS EXPECTED  Intervention: [place] patient and significant other/support system to call light to alert staff of needs  Flowsheets (Taken 9/13/2020 2301)  Oriented to:: All of the Following : Location of Bathroom, Visiting Policy, Unit Routine, Call Light and Bedside Controls, Bedside Rail Policy, Smoking Policy, Rights and Responsibilities, Bedside Report, and Patient Education Notebook  Intervention: Educate patient and significant other/support system about the plan of care, procedures, treatments, medications and allow for questions  Flowsheets (Taken 9/13/2020 2301)  Pt & Family Have Been Educated on Methods Available to Report Concerns Related to Care, Treatment, Services, and Patient Safety Issues: Yes     Problem: Safety  Goal: Will remain free from falls  Outcome: PROGRESSING AS EXPECTED

## 2020-09-14 NOTE — THERAPY
Speech Language Pathology  Daily Treatment     Patient Name: Tavo Ramos  Age:  85 y.o., Sex:  male  Medical Record #: [id number]  Today's Date: 9/14/2020     Precautions  Precautions: (P) Fall Risk    Assessment    The patient was seen for dysphagia therapy during EC7/TN0 morning meal. Patient was alert and oriented x4 upon arrival of SLP. The patient consumed regular textured meal with thin liquids with no overt s/sx of aspiration. The patient's vocal quality remained clear throughout the therapy session. Swallow trigger was timely and hyolaryngeal elevation was complete. The patient's swallow function appears to be within normal limits at this time, recommend con't regular easy to chew diet with thin liquids. Speech to discharge as goals are met. Please re-consult speech with any change in status.     Plan    Discharge secondary to goals met.    Discharge Recommendations: Anticipate that the patient will have no further speech therapy needs after discharge from the hospital      Objective       09/14/20 0852   Dysphagia    Diet / Liquid Recommendation Regular - Easy to Chew (7);Thin (0)   Nutritional Liquid Intake Rating Scale Non thickened beverages   Nutritional Food Intake Rating Scale Total oral diet with multiple consistencies without special preparation but with specific food limitations   Skilled Intervention Compensatory Strategies;Verbal Cueing;External Aids   Recommended Route of Medication Administration   Medication Administration  Whole with Liquid Wash

## 2020-09-14 NOTE — PROGRESS NOTES
Dr. Joseph notified that patient's /87 and BP responded well this AM to IV enalapril. Dr. Joseph notified that patient is on Metoprolol 25 mg BID, Norvasc 5 mg, and Lasix 20 mg QD. Lisinopril 10 mg QD ordered to be given now.

## 2020-09-14 NOTE — CARE PLAN
Problem: Safety  Goal: Will remain free from injury  Outcome: PROGRESSING AS EXPECTED  Intervention: Provide assistance with mobility  Note: Call light, bedside table, and urinal within reach. Pt calls when OOB; OOB with FWW and SBA. Chair alarm on.        Problem: Infection  Goal: Will remain free from infection  Outcome: PROGRESSING AS EXPECTED  Intervention: Assess signs and symptoms of infection  Note: Pt afebrile.

## 2020-09-15 NOTE — DISCHARGE SUMMARY
Discharge Summary    CHIEF COMPLAINT ON ADMISSION  Chief Complaint   Patient presents with   • Seizure     hx of,  intubated in route   • Possible Stroke     per EMS possible (R) gaze       Reason for Admission  Seizure     Admission Date  9/9/2020    CODE STATUS  Prior    HPI & HOSPITAL COURSE  This is a 85 y.o. male here with \      84 yo male who presented 9/9/2020 with seizures and respiratory failure. Patient was in his usual state of health until this morning when he was sitting in a chair and started to have sudden onset RUE weakness and inability to speak. He subsequently developed a witnessed (by wife) generalized shaking episode concerning for seizure lasting a 5-15 min until EMS arrived and administered versed. Post-versed, patient's shaking stopped but then started to snore and desaturate prompting nasal intubation by the FD. [facility] was dispatched and transported patient on a vent. Required additional versed en route for agitation. In ED underwent CT imaging and neuro was consulted. Starting to awaken on my exam without ongoing seizure-like activity, on hospital day 1.  Patient was subsequently extubated.  Stable for discharge out of ICU on hospital day 2.  He has persistent encephalopathy, which gradually improved, he is approximately at his baseline on 9/13/2020. Patient was seen by neurology, antiepileptics initiated per the recommendations.  He was noted to have a somewhat elevated blood pressure for which home medications were titrated as needed.  This was improved and he was stable for discharge on 9/14/2020.    Therefore, he is discharged in good and stable condition to home with close outpatient follow-up.    The patient met 2-midnight criteria for an inpatient stay at the time of discharge.    Discharge Date  9/14/2020    FOLLOW UP ITEMS POST DISCHARGE  PCP     DISCHARGE DIAGNOSES  Principal Problem (Resolved):    Acute respiratory failure with hypoxia (HCC) POA: Unknown  Active Problems:     Seizure (HCC) POA: Unknown    Encephalopathy POA: Unknown  Resolved Problems:    Lactic acidosis POA: Unknown    Leukocytosis POA: Unknown    Hyponatremia POA: Unknown      FOLLOW UP  No future appointments.  Joseluis Castaneda M.D.  [address] [place] [id number]  [phone]    Schedule an appointment as soon as possible for a visit  Please call to schedule a hospital follow up with your primary care physician. Thank you!      MEDICATIONS ON DISCHARGE     Medication List      START taking these medications      Instructions   amLODIPine 5 MG Tabs  Start taking on: September 15, 2020  Commonly known as: NORVASC   Take 1 Tab by mouth every day.  Dose: 5 mg     levETIRAcetam 500 MG Tabs  Commonly known as: KEPPRA   Take 1 Tab by mouth 2 Times a Day.  Dose: 500 mg     lisinopril 10 MG Tabs  Start taking on: September 15, 2020  Commonly known as: PRINIVIL   Take 2 Tabs by mouth every day.  Dose: 20 mg        CONTINUE taking these medications      Instructions   finasteride 5 MG Tabs  Commonly known as: PROSCAR   Take 5 mg by mouth every day.  Dose: 5 mg     furosemide 20 MG Tabs  Commonly known as: LASIX   Take 20 mg by mouth every day.  Dose: 20 mg     metoprolol  MG Tb24  Commonly known as: TOPROL XL   Take 100 mg by mouth every day.  Dose: 100 mg     simvastatin 20 MG Tabs  Commonly known as: ZOCOR   Take 20 mg by mouth every evening.  Dose: 20 mg            Allergies  No Known Allergies    DIET  No orders of the defined types were placed in this encounter.      ACTIVITY  As tolerated.  Weight bearing as tolerated    CONSULTATIONS  Pulmonary medicine, Dr. Gonda, 9/9  Neurology, Dr. Underwood, 9/9      RADIOLOGY  [id number] VIEW   Final Result      No evidence of bowel obstruction. Possible ileus.                  DX-CHEST-PORTABLE (1 VIEW)   Final Result         1.  Pulmonary edema and/or infiltrates are identified, which are stable since the prior exam.   2.  Cardiomegaly      DX-CHEST-PORTABLE (1  VIEW)   Final Result         1.  Pulmonary edema and/or infiltrates are identified, which are stable since the prior exam.   2.  Cardiomegaly      MR-BRAIN-W/O   Final Result      1.  Diffuse severe atrophy.   2.  Probable periventricular white matter microvascular ischemic changes.   3.  No acute stroke.   4.  Probable sequela of prior punctate hemorrhage in the LEFT occipital region.  No findings to indicate acute hemorrhage.      Preliminary findings were discussed with Dr. Caery on by Dr. Aguirre 9/10/2020 8:28 AM.      DX-CHEST-LIMITED (1 VIEW)   Final Result      1.  No acute cardiopulmonary disease.   2.  Supportive tubing as described above.      CT-CTA HEAD WITH & W/O-POST PROCESS   Final Result      CT angiogram of the [place] within normal limits.      CT-CEREBRAL PERFUSION ANALYSIS   Final Result      1.  Cerebral blood flow less than 30% likely representing completed infarct = 0 mL.      2.  T Max more than 6 seconds likely representing combination of completed infarct and ischemia = 70 mL.      3.  Mismatched volume likely representing ischemic brain/penumbra = 70 mL      4.  Please note that the cerebral perfusion was performed on the limited brain tissue around the basal ganglia region. Infarct/ischemia outside the CT perfusion sections can be missed in this study.      CT-HEAD W/O   Final Result      1.  Limited exam showing no acute intracranial hemorrhage or territorial infarct.   2.  Diffuse atrophy and white matter microvascular ischemic changes.   3.  Motion artifact limits exam.               LABORATORY  Lab Results   Component Value Date    SODIUM 131 (L) 09/14/2020    POTASSIUM 3.5 (L) 09/14/2020    CHLORIDE 95 (L) 09/14/2020    CO2 21 09/14/2020    GLUCOSE 103 (H) 09/14/2020    BUN 10 09/14/2020    CREATININE 0.66 09/14/2020        Lab Results   Component Value Date    WBC 8.4 09/14/2020    HEMOGLOBIN 13.9 (L) 09/14/2020    HEMATOCRIT 40.0 (L) 09/14/2020    PLATELETCT 195 09/14/2020         Total time of the discharge process exceeds 32 minutes.

## 2020-10-12 ENCOUNTER — IMMUNIZATION (OUTPATIENT)
Dept: SOCIAL WORK | Facility: CLINIC | Age: 85
End: 2020-10-12
Payer: MEDICARE

## 2020-10-12 DIAGNOSIS — Z23 NEED FOR VACCINATION: ICD-10-CM

## 2020-10-12 PROCEDURE — G0008 ADMIN INFLUENZA VIRUS VAC: HCPCS | Performed by: REGISTERED NURSE

## 2020-10-12 PROCEDURE — 90662 IIV NO PRSV INCREASED AG IM: CPT | Performed by: REGISTERED NURSE

## 2021-01-11 DIAGNOSIS — Z23 NEED FOR VACCINATION: ICD-10-CM
